# Patient Record
Sex: FEMALE | Race: ASIAN | NOT HISPANIC OR LATINO | URBAN - METROPOLITAN AREA
[De-identification: names, ages, dates, MRNs, and addresses within clinical notes are randomized per-mention and may not be internally consistent; named-entity substitution may affect disease eponyms.]

---

## 2021-03-01 ENCOUNTER — OUTPATIENT (OUTPATIENT)
Dept: OUTPATIENT SERVICES | Facility: HOSPITAL | Age: 76
LOS: 1 days | End: 2021-03-01
Payer: MEDICAID

## 2021-03-03 ENCOUNTER — TRANSCRIPTION ENCOUNTER (OUTPATIENT)
Age: 76
End: 2021-03-03

## 2021-03-04 ENCOUNTER — INPATIENT (INPATIENT)
Facility: HOSPITAL | Age: 76
LOS: 4 days | Discharge: HOME | DRG: 853 | End: 2021-03-09
Attending: INTERNAL MEDICINE | Admitting: INTERNAL MEDICINE
Payer: MEDICAID

## 2021-03-04 VITALS — WEIGHT: 141.98 LBS

## 2021-03-04 DIAGNOSIS — A41.9 SEPSIS, UNSPECIFIED ORGANISM: ICD-10-CM

## 2021-03-04 DIAGNOSIS — N17.9 ACUTE KIDNEY FAILURE, UNSPECIFIED: ICD-10-CM

## 2021-03-04 DIAGNOSIS — I10 ESSENTIAL (PRIMARY) HYPERTENSION: ICD-10-CM

## 2021-03-04 DIAGNOSIS — Z01.818 ENCOUNTER FOR OTHER PREPROCEDURAL EXAMINATION: ICD-10-CM

## 2021-03-04 DIAGNOSIS — Z29.9 ENCOUNTER FOR PROPHYLACTIC MEASURES, UNSPECIFIED: ICD-10-CM

## 2021-03-04 DIAGNOSIS — E03.9 HYPOTHYROIDISM, UNSPECIFIED: ICD-10-CM

## 2021-03-04 DIAGNOSIS — E11.9 TYPE 2 DIABETES MELLITUS WITHOUT COMPLICATIONS: ICD-10-CM

## 2021-03-04 DIAGNOSIS — Z90.49 ACQUIRED ABSENCE OF OTHER SPECIFIED PARTS OF DIGESTIVE TRACT: Chronic | ICD-10-CM

## 2021-03-04 DIAGNOSIS — N20.1 CALCULUS OF URETER: ICD-10-CM

## 2021-03-04 DIAGNOSIS — E78.5 HYPERLIPIDEMIA, UNSPECIFIED: ICD-10-CM

## 2021-03-04 DIAGNOSIS — E87.2 ACIDOSIS: ICD-10-CM

## 2021-03-04 LAB
ACETONE SERPL-MCNC: NEGATIVE — SIGNIFICANT CHANGE UP
ALBUMIN SERPL ELPH-MCNC: 3.2 G/DL — LOW (ref 3.5–5)
ALP SERPL-CCNC: 48 U/L — SIGNIFICANT CHANGE UP (ref 40–120)
ALT FLD-CCNC: 25 U/L DA — SIGNIFICANT CHANGE UP (ref 10–60)
ANION GAP SERPL CALC-SCNC: 10 MMOL/L — SIGNIFICANT CHANGE UP (ref 5–17)
APPEARANCE UR: ABNORMAL
APTT BLD: 35.3 SEC — SIGNIFICANT CHANGE UP (ref 27.5–35.5)
AST SERPL-CCNC: 24 U/L — SIGNIFICANT CHANGE UP (ref 10–40)
BACTERIA # UR AUTO: ABNORMAL /HPF
BASOPHILS # BLD AUTO: 0 K/UL — SIGNIFICANT CHANGE UP (ref 0–0.2)
BASOPHILS NFR BLD AUTO: 0 % — SIGNIFICANT CHANGE UP (ref 0–2)
BILIRUB SERPL-MCNC: 0.5 MG/DL — SIGNIFICANT CHANGE UP (ref 0.2–1.2)
BILIRUB UR-MCNC: NEGATIVE — SIGNIFICANT CHANGE UP
BUN SERPL-MCNC: 22 MG/DL — HIGH (ref 7–18)
CALCIUM SERPL-MCNC: 10 MG/DL — SIGNIFICANT CHANGE UP (ref 8.4–10.5)
CHLORIDE SERPL-SCNC: 101 MMOL/L — SIGNIFICANT CHANGE UP (ref 96–108)
CO2 SERPL-SCNC: 23 MMOL/L — SIGNIFICANT CHANGE UP (ref 22–31)
COLOR SPEC: YELLOW — SIGNIFICANT CHANGE UP
CREAT SERPL-MCNC: 1.39 MG/DL — HIGH (ref 0.5–1.3)
DIFF PNL FLD: ABNORMAL
EOSINOPHIL # BLD AUTO: 0 K/UL — SIGNIFICANT CHANGE UP (ref 0–0.5)
EOSINOPHIL NFR BLD AUTO: 0 % — SIGNIFICANT CHANGE UP (ref 0–6)
EPI CELLS # UR: SIGNIFICANT CHANGE UP /HPF
GLUCOSE SERPL-MCNC: 198 MG/DL — HIGH (ref 70–99)
GLUCOSE UR QL: NEGATIVE — SIGNIFICANT CHANGE UP
HCT VFR BLD CALC: 35.1 % — SIGNIFICANT CHANGE UP (ref 34.5–45)
HGB BLD-MCNC: 11.5 G/DL — SIGNIFICANT CHANGE UP (ref 11.5–15.5)
HYALINE CASTS # UR AUTO: ABNORMAL /LPF
INR BLD: 1.28 RATIO — HIGH (ref 0.88–1.16)
KETONES UR-MCNC: ABNORMAL
LACTATE SERPL-SCNC: 2.5 MMOL/L — HIGH (ref 0.7–2)
LACTATE SERPL-SCNC: 2.5 MMOL/L — HIGH (ref 0.7–2)
LEUKOCYTE ESTERASE UR-ACNC: ABNORMAL
LYMPHOCYTES # BLD AUTO: 1.44 K/UL — SIGNIFICANT CHANGE UP (ref 1–3.3)
LYMPHOCYTES # BLD AUTO: 8 % — LOW (ref 13–44)
MANUAL SMEAR VERIFICATION: SIGNIFICANT CHANGE UP
MCHC RBC-ENTMCNC: 27.6 PG — SIGNIFICANT CHANGE UP (ref 27–34)
MCHC RBC-ENTMCNC: 32.8 GM/DL — SIGNIFICANT CHANGE UP (ref 32–36)
MCV RBC AUTO: 84.4 FL — SIGNIFICANT CHANGE UP (ref 80–100)
MONOCYTES # BLD AUTO: 1.26 K/UL — HIGH (ref 0–0.9)
MONOCYTES NFR BLD AUTO: 7 % — SIGNIFICANT CHANGE UP (ref 2–14)
NEUTROPHILS # BLD AUTO: 15.29 K/UL — HIGH (ref 1.8–7.4)
NEUTROPHILS NFR BLD AUTO: 75 % — SIGNIFICANT CHANGE UP (ref 43–77)
NEUTS BAND # BLD: 10 % — HIGH (ref 0–8)
NITRITE UR-MCNC: POSITIVE
NRBC # BLD: 0 /100 — SIGNIFICANT CHANGE UP (ref 0–0)
PH UR: 5 — SIGNIFICANT CHANGE UP (ref 5–8)
PLAT MORPH BLD: NORMAL — SIGNIFICANT CHANGE UP
PLATELET # BLD AUTO: 159 K/UL — SIGNIFICANT CHANGE UP (ref 150–400)
POTASSIUM SERPL-MCNC: 3.6 MMOL/L — SIGNIFICANT CHANGE UP (ref 3.5–5.3)
POTASSIUM SERPL-SCNC: 3.6 MMOL/L — SIGNIFICANT CHANGE UP (ref 3.5–5.3)
PROT SERPL-MCNC: 7.5 G/DL — SIGNIFICANT CHANGE UP (ref 6–8.3)
PROT UR-MCNC: 30 MG/DL
PROTHROM AB SERPL-ACNC: 15 SEC — HIGH (ref 10.6–13.6)
RBC # BLD: 4.16 M/UL — SIGNIFICANT CHANGE UP (ref 3.8–5.2)
RBC # FLD: 14.7 % — HIGH (ref 10.3–14.5)
RBC BLD AUTO: NORMAL — SIGNIFICANT CHANGE UP
RBC CASTS # UR COMP ASSIST: ABNORMAL /HPF (ref 0–2)
SARS-COV-2 RNA SPEC QL NAA+PROBE: SIGNIFICANT CHANGE UP
SODIUM SERPL-SCNC: 134 MMOL/L — LOW (ref 135–145)
SP GR SPEC: 1.01 — SIGNIFICANT CHANGE UP (ref 1.01–1.02)
UROBILINOGEN FLD QL: NEGATIVE — SIGNIFICANT CHANGE UP
WBC # BLD: 17.99 K/UL — HIGH (ref 3.8–10.5)
WBC # FLD AUTO: 17.99 K/UL — HIGH (ref 3.8–10.5)
WBC UR QL: >50 /HPF (ref 0–5)

## 2021-03-04 PROCEDURE — 70450 CT HEAD/BRAIN W/O DYE: CPT | Mod: 26,MA

## 2021-03-04 PROCEDURE — 93010 ELECTROCARDIOGRAM REPORT: CPT

## 2021-03-04 PROCEDURE — 74177 CT ABD & PELVIS W/CONTRAST: CPT | Mod: 26,MA

## 2021-03-04 PROCEDURE — 99291 CRITICAL CARE FIRST HOUR: CPT

## 2021-03-04 PROCEDURE — 71045 X-RAY EXAM CHEST 1 VIEW: CPT | Mod: 26

## 2021-03-04 PROCEDURE — 52332 CYSTOSCOPY AND TREATMENT: CPT | Mod: LT

## 2021-03-04 PROCEDURE — 99223 1ST HOSP IP/OBS HIGH 75: CPT

## 2021-03-04 RX ORDER — HEPARIN SODIUM 5000 [USP'U]/ML
5000 INJECTION INTRAVENOUS; SUBCUTANEOUS EVERY 8 HOURS
Refills: 0 | Status: DISCONTINUED | OUTPATIENT
Start: 2021-03-04 | End: 2021-03-09

## 2021-03-04 RX ORDER — SODIUM CHLORIDE 9 MG/ML
2000 INJECTION INTRAMUSCULAR; INTRAVENOUS; SUBCUTANEOUS ONCE
Refills: 0 | Status: COMPLETED | OUTPATIENT
Start: 2021-03-04 | End: 2021-03-04

## 2021-03-04 RX ORDER — CIPROFLOXACIN LACTATE 400MG/40ML
400 VIAL (ML) INTRAVENOUS EVERY 12 HOURS
Refills: 0 | Status: DISCONTINUED | OUTPATIENT
Start: 2021-03-04 | End: 2021-03-05

## 2021-03-04 RX ORDER — INSULIN LISPRO 100/ML
VIAL (ML) SUBCUTANEOUS EVERY 6 HOURS
Refills: 0 | Status: DISCONTINUED | OUTPATIENT
Start: 2021-03-04 | End: 2021-03-07

## 2021-03-04 RX ORDER — SODIUM CHLORIDE 9 MG/ML
1000 INJECTION, SOLUTION INTRAVENOUS
Refills: 0 | Status: DISCONTINUED | OUTPATIENT
Start: 2021-03-04 | End: 2021-03-09

## 2021-03-04 RX ORDER — GLUCAGON INJECTION, SOLUTION 0.5 MG/.1ML
1 INJECTION, SOLUTION SUBCUTANEOUS ONCE
Refills: 0 | Status: DISCONTINUED | OUTPATIENT
Start: 2021-03-04 | End: 2021-03-09

## 2021-03-04 RX ORDER — DEXTROSE 50 % IN WATER 50 %
15 SYRINGE (ML) INTRAVENOUS ONCE
Refills: 0 | Status: DISCONTINUED | OUTPATIENT
Start: 2021-03-04 | End: 2021-03-09

## 2021-03-04 RX ORDER — PIPERACILLIN AND TAZOBACTAM 4; .5 G/20ML; G/20ML
3.38 INJECTION, POWDER, LYOPHILIZED, FOR SOLUTION INTRAVENOUS ONCE
Refills: 0 | Status: COMPLETED | OUTPATIENT
Start: 2021-03-04 | End: 2021-03-04

## 2021-03-04 RX ORDER — DEXTROSE MONOHYDRATE, SODIUM CHLORIDE, AND POTASSIUM CHLORIDE 50; .745; 4.5 G/1000ML; G/1000ML; G/1000ML
1000 INJECTION, SOLUTION INTRAVENOUS
Refills: 0 | Status: DISCONTINUED | OUTPATIENT
Start: 2021-03-04 | End: 2021-03-05

## 2021-03-04 RX ORDER — MORPHINE SULFATE 50 MG/1
2 CAPSULE, EXTENDED RELEASE ORAL EVERY 4 HOURS
Refills: 0 | Status: DISCONTINUED | OUTPATIENT
Start: 2021-03-04 | End: 2021-03-07

## 2021-03-04 RX ORDER — METRONIDAZOLE 500 MG
500 TABLET ORAL EVERY 8 HOURS
Refills: 0 | Status: DISCONTINUED | OUTPATIENT
Start: 2021-03-04 | End: 2021-03-05

## 2021-03-04 RX ORDER — ACETAMINOPHEN 500 MG
650 TABLET ORAL EVERY 6 HOURS
Refills: 0 | Status: DISCONTINUED | OUTPATIENT
Start: 2021-03-04 | End: 2021-03-09

## 2021-03-04 RX ORDER — ONDANSETRON 8 MG/1
4 TABLET, FILM COATED ORAL EVERY 6 HOURS
Refills: 0 | Status: DISCONTINUED | OUTPATIENT
Start: 2021-03-04 | End: 2021-03-09

## 2021-03-04 RX ORDER — SODIUM CHLORIDE 9 MG/ML
1000 INJECTION INTRAMUSCULAR; INTRAVENOUS; SUBCUTANEOUS ONCE
Refills: 0 | Status: COMPLETED | OUTPATIENT
Start: 2021-03-04 | End: 2021-03-04

## 2021-03-04 RX ORDER — DEXTROSE 50 % IN WATER 50 %
25 SYRINGE (ML) INTRAVENOUS ONCE
Refills: 0 | Status: DISCONTINUED | OUTPATIENT
Start: 2021-03-04 | End: 2021-03-09

## 2021-03-04 RX ORDER — ACETAMINOPHEN 500 MG
650 TABLET ORAL ONCE
Refills: 0 | Status: COMPLETED | OUTPATIENT
Start: 2021-03-04 | End: 2021-03-04

## 2021-03-04 RX ORDER — DEXTROSE 50 % IN WATER 50 %
12.5 SYRINGE (ML) INTRAVENOUS ONCE
Refills: 0 | Status: DISCONTINUED | OUTPATIENT
Start: 2021-03-04 | End: 2021-03-09

## 2021-03-04 RX ADMIN — Medication 200 MILLIGRAM(S): at 22:15

## 2021-03-04 RX ADMIN — SODIUM CHLORIDE 2000 MILLILITER(S): 9 INJECTION INTRAMUSCULAR; INTRAVENOUS; SUBCUTANEOUS at 17:18

## 2021-03-04 RX ADMIN — Medication 650 MILLIGRAM(S): at 16:27

## 2021-03-04 RX ADMIN — Medication 100 MILLIGRAM(S): at 21:13

## 2021-03-04 RX ADMIN — Medication 650 MILLIGRAM(S): at 16:59

## 2021-03-04 RX ADMIN — PIPERACILLIN AND TAZOBACTAM 200 GRAM(S): 4; .5 INJECTION, POWDER, LYOPHILIZED, FOR SOLUTION INTRAVENOUS at 16:27

## 2021-03-04 RX ADMIN — HEPARIN SODIUM 5000 UNIT(S): 5000 INJECTION INTRAVENOUS; SUBCUTANEOUS at 21:12

## 2021-03-04 RX ADMIN — SODIUM CHLORIDE 1000 MILLILITER(S): 9 INJECTION INTRAMUSCULAR; INTRAVENOUS; SUBCUTANEOUS at 20:49

## 2021-03-04 RX ADMIN — SODIUM CHLORIDE 2000 MILLILITER(S): 9 INJECTION INTRAMUSCULAR; INTRAVENOUS; SUBCUTANEOUS at 16:15

## 2021-03-04 RX ADMIN — PIPERACILLIN AND TAZOBACTAM 3.38 GRAM(S): 4; .5 INJECTION, POWDER, LYOPHILIZED, FOR SOLUTION INTRAVENOUS at 15:58

## 2021-03-04 RX ADMIN — DEXTROSE MONOHYDRATE, SODIUM CHLORIDE, AND POTASSIUM CHLORIDE 125 MILLILITER(S): 50; .745; 4.5 INJECTION, SOLUTION INTRAVENOUS at 22:53

## 2021-03-04 NOTE — H&P ADULT - NSHPPHYSICALEXAM_GEN_ALL_CORE
NAD  awake, poor historian, non verbal, lethargic  S1S2  b/l air entry  abd soft, no rebound or guarding, appears NT  ext no c/c

## 2021-03-04 NOTE — ED ADULT NURSE NOTE - OBJECTIVE STATEMENT
pt is here for altered mental status.  BIBA, altered mental status for 2 hours, denied chest pain or sob, no distress noted at this time.

## 2021-03-04 NOTE — CONSULT NOTE ADULT - PROBLEM SELECTOR RECOMMENDATION 8
Pt on lipitor   medication on hold while pt is npo pt is on Levothyroxine 100mcg   will start 75mg IV push as pt is npo   f/u TSH

## 2021-03-04 NOTE — CONSULT NOTE ADULT - PROBLEM SELECTOR RECOMMENDATION 4
Likely From sepsis, unlikely NGOZI   C/W IVF    trend lactate -BUN/CR 22/1.39   - Unknown baseline   - Prerenal in the setting of sepsis and or post renal in the setting of left mild hydroureteronephrosis   - s/p IVF   - Will recommend to recheck BMP   - Avoid nephrotoxins   - If remains high would recommend to send urine lytes

## 2021-03-04 NOTE — CONSULT NOTE ADULT - ASSESSMENT
75 year old female with past medical hx of HTN, DM, Hypothyroidism, Dementia, GERD being admitted to under surgical service for sepsis secondary to urolithiasis and obstruction with left sided mild hydroureteronephrosis, pt had fever of 102.7 in the ED, has wbc count of 17.99. U/A is positive for infection, elevated lactate of 2.5, s/p 3L IVF and zosyn in the ED currently on IVF and Ciprofloxacin and flagyl, blood cultures and urine cultures testing. Medical consult was requested given multiple comorbidities and for pre op optimization.

## 2021-03-04 NOTE — CONSULT NOTE ADULT - PROBLEM SELECTOR RECOMMENDATION 5
Pt is on Metformin 500mg bid, Glimepiride 2mg OD, Alogliptin 25mg for DM   Hold po meds for now   will start pt on lantus 5 and HSS   monitor blood glucose level >140 <180  f/u hba1c Likely From sepsis, unlikely NGOZI   C/W IVF    trend lactate

## 2021-03-04 NOTE — H&P ADULT - HISTORY OF PRESENT ILLNESS
74 y/o female with h/o HTN, DM, HLD, hypothyroid  PSH-cholecystectomy, hip sx    c/o several days abd pain brought by family for lethargy today, difficulty ambulating, fever    < from: CT Head No Cont (03.04.21 @ 18:54) >  IMPRESSION: No acute intracranial hemorrhage, mass effect, or shift of the midline structures.    Mild chronic white matter microvascular type changes.      < end of copied text >  < from: CT Abdomen and Pelvis w/ IV Cont (03.04.21 @ 18:53) >  IMPRESSION:    Mild left hydroureteronephrosis secondary to a 1.5 cm calculus proximal left ureter. Correlate clinicallyfor concomitant infection.  No evidence of appendicitis or colitis      < end of copied text >  < from: CT Abdomen and Pelvis w/ IV Cont (03.04.21 @ 18:53) >  KIDNEYS/URETERS: Mild left hydroureteronephrosis secondary to a 1.5 cmcalculus in the proximal third of the left ureter. Augmented urothelial enhancement left renal pelvis and proximal ureter. Correlate clinically for infection. Obstructive left nephrogram  left perirenal stranding, small left perirenal fluid. Left renal cyst    < end of copied text >  Febrile in ED to 103  wbc 18, renal function^, lactate 2.5, UA+

## 2021-03-04 NOTE — H&P ADULT - NSICDXPASTMEDICALHX_GEN_ALL_CORE_FT
PAST MEDICAL HISTORY:  DM (diabetes mellitus)     HLD (hyperlipidemia)     HTN (hypertension)     Hypothyroidism

## 2021-03-04 NOTE — ED PROVIDER NOTE - PROGRESS NOTE DETAILS
CT w L sided hydronephrosis and UTI, infected stone and sepsis. Feels mildly better, lactate not cleared after 2 L NS. Will give another liter NS. Paged Dr Benton, no answer. Spoke w Dr Velasco, aware of patient and likely need for immediate intervention/stent placement. Will see pt and speak w Dr Betnon (currently in the OR). Dr Quintanilla in the ED, accepted pt under Dr Benton. Will get Medicine consult for clearance

## 2021-03-04 NOTE — ED PROVIDER NOTE - CLINICAL SUMMARY MEDICAL DECISION MAKING FREE TEXT BOX
Sepsis from viral source vs bacterial source. Possible abdominal pathology vs COVID-19. Will get septic workup, antibiotics, CT head and likely admit.

## 2021-03-04 NOTE — ED PROVIDER NOTE - CARE PLAN
Principal Discharge DX:	Sepsis, due to unspecified organism, unspecified whether acute organ dysfunction present  Secondary Diagnosis:	Urinary tract infection with hematuria, site unspecified  Secondary Diagnosis:	Nephrolithiasis   Principal Discharge DX:	Sepsis, due to unspecified organism, unspecified whether acute organ dysfunction present  Secondary Diagnosis:	Urinary tract infection with hematuria, site unspecified  Secondary Diagnosis:	Ureterolithiasis

## 2021-03-04 NOTE — ED PROVIDER NOTE - MUSCULOSKELETAL, MLM
Spine appears normal, range of motion is not limited, no muscle or joint tenderness, moving all extremities.

## 2021-03-04 NOTE — CONSULT NOTE ADULT - PROBLEM SELECTOR RECOMMENDATION 3
Pt has ECG showing sinus tachycardia likely in the setting of sepsis   Has METS score of >4 given pt is able to walk outside around 45 min and 20 min of indoor cycling without any limitation.   Has RCRI score of 0, class 1 risk, 3.9% 30-day risk of death, MI, or cardiac arrest   Given her age pt is intermediate risk for low risk procedure

## 2021-03-04 NOTE — CONSULT NOTE ADULT - PROBLEM SELECTOR RECOMMENDATION 9
-Pt has sepsis likely from obstructed urolithiasis.   -fever + positive U/A + 1.5 cm stone on left ureter with mild hydroureteronephrosis on left side + elevated wbc count+ lactate elevated to 2.5   -S/p 3L ivf and zosyn in the ED   - Currently pt is on Ciprofloxacin and flagyl --> would recommend to change abx to Zosyn.   - Agree with IVF   - Pt might need cystoscopy with stent   - Would recommend to recheck lactate as lactate is steady at 2.5  - F/u Blood and urine cultures Pt is on heparin sq for dvt ppx Pt on lipitor   medication on hold while pt is npo

## 2021-03-04 NOTE — ED PROVIDER NOTE - OBJECTIVE STATEMENT
76 y/o F with a significant PMHx of DM, HTN, HLD, hypothyroidism, cholecystectomy and hip surgery presents to the ED for AMS. As per EMS, patient is unable to provide history. Daughter contacted over the phone states that since yesterday, patient has been complaining of abdominal pain, constipation, fever of 102F and generalized weakness today. Patient also unable to ambulate. Denies vomiting, diarrhea, cough, or other respiratory symptoms. Patient's overall status deteriorated today, causing concern and ambulance was called. Patient received first dose of COVID-19 vaccine last month. No sick contacts or recent travel.

## 2021-03-04 NOTE — CONSULT NOTE ADULT - PROBLEM SELECTOR RECOMMENDATION 7
pt is on Levothyroxine 100mcg   will start 75mg IV push as pt is npo   f/u TSH Pt is on losartan  will hold meds for now given ascencion and normal blood pressure in a pt with sepsis

## 2021-03-04 NOTE — CONSULT NOTE ADULT - PROBLEM SELECTOR RECOMMENDATION 6
Pt is on losartan  will hold meds for now given ascencion and normal blood pressure in a pt with sepsis Pt is on Metformin 500mg bid, Glimepiride 2mg OD, Alogliptin 25mg for DM   Hold po meds for now   will start pt on lantus 5 and HSS   monitor blood glucose level >140 <180  f/u hba1c

## 2021-03-04 NOTE — H&P ADULT - PROBLEM SELECTOR PLAN 1
keep npo, admit, needs iv hydration, serial lactate, cbc, iv abx, med consult, pre-op mode for poss cysto/stent

## 2021-03-04 NOTE — CONSULT NOTE ADULT - ATTENDING COMMENTS
75 year old with PMH of HTN, HLD, hypothyroidism admitted with clinical features keeping with acute septic encephalopathy from a UTI, left pyelonephritis, a large left obstructive ureteral calculus with concern for ascending infection 75 year old with PMH of HTN, HLD, hypothyroidism admitted with clinical features keeping with acute septic encephalopathy from a UTI, left pyelonephritis, a large left obstructive ureteral calculus with left hydroureteronephrosis with concern for ascending infection.  Pt admitted to the Surgery-Urology service and for urgent urologic intervention. Hospital Medicine consultation requested for darren-op and post op care /management.    Vital Signs Last 24 Hrs  T(C): 37.6 (05 Mar 2021 02:30), Max: 39.3 (04 Mar 2021 16:02)  T(F): 99.6 (05 Mar 2021 02:30), Max: 102.7 (04 Mar 2021 16:02)  HR: 115 (05 Mar 2021 02:30) (97 - 122)  BP: 138/67 (05 Mar 2021 02:30) (114/65 - 148/69)  BP(mean): 83 (05 Mar 2021 02:30) (76 - 126)  RR: 22 (05 Mar 2021 02:30) (18 - 31)  SpO2: 100% (05 Mar 2021 02:30) (93% - 100%)    Exams as above    Labs reviewed                        11.5   17.99 )-----------( 159      ( 04 Mar 2021 16:26 )             35.1   leucocytosis with left shift      03-04    134<L>  |  101  |  22<H>  ----------------------------<  198<H>  3.6   |  23  |  1.39<H>    Ca    10.0      04 Mar 2021 16:26  TPro  7.5  /  Alb  3.2<L>  /  TBili  0.5  /  DBili  x   /  AST  24  /  ALT  25  /  AlkPhos  48  03-04    Urinalysis Basic - ( 04 Mar 2021 16:41 )  Color: Yellow / Appearance: Slightly Turbid / S.015 / pH: x  Gluc: x / Ketone: Trace  / Bili: Negative / Urobili: Negative   Blood: x / Protein: 30 mg/dL / Nitrite: Positive   Leuk Esterase: Moderate / RBC: 2-5 /HPF / WBC >50 /HPF   Sq Epi: x / Non Sq Epi: Few /HPF / Bacteria: Moderate /HPF     CT abd/pelvis  Mild left hydroureteronephrosis secondary to a 1.5 cm calculus in the proximal third of the left ureter. Augmented urothelial enhancement left renal pelvis and proximal ureter. Correlate clinically for infection. Obstructive left nephrogram  left perirenal stranding, small left perirenal fluid. Left renal cyst    CT head   unremarkable acutely    Impression  75 year old woman with PMH as detailed above admitted acutely with fevers and changes in mental status all c/w acute septic encephalopathy  from UTI/ left pyelonephritis, a large left obstructive ureteral calculus with left hydroureteronephrosis with concern for ascending infection.  Pt covered with broad spectrum IV antibiotics but with urgent need for infection source control would be taken to the OR for a cystoscopy and relief of obstruction.    Assessment  1) Acute septic encephalopathy  2) Left pyelonephritis with sepsis  3) Left obstructive ureteral calculus  4) Left hydroureteronephrosis  5) RICKEY    PRE-OP consideration  While this urgent and potentially life-saving procedure supersedes any contraindications to surgery, it is worthwhile to note that the patient has a low risk for a cardiac event or mortality in the darren-op and 30-day post op period based on the RCRI and Beltran scores.    Recommendations  1) Continue broad spectrum antibiotics with IV zosyn post -procedure/op  2) Follow sepsis work up ; repeat lactate and chemistry  3) Continue judicious IVF   4) Monitor renal indices for improvement  5) Short acting insulin regimen - RISS with POC glucose q 4 -6 hourly till able to commence oral intake  6) Other plans as above

## 2021-03-05 LAB
ALBUMIN SERPL ELPH-MCNC: 2.3 G/DL — LOW (ref 3.5–5)
ALP SERPL-CCNC: 47 U/L — SIGNIFICANT CHANGE UP (ref 40–120)
ALT FLD-CCNC: 18 U/L DA — SIGNIFICANT CHANGE UP (ref 10–60)
ANION GAP SERPL CALC-SCNC: 10 MMOL/L — SIGNIFICANT CHANGE UP (ref 5–17)
AST SERPL-CCNC: 23 U/L — SIGNIFICANT CHANGE UP (ref 10–40)
BASOPHILS # BLD AUTO: 0 K/UL — SIGNIFICANT CHANGE UP (ref 0–0.2)
BASOPHILS NFR BLD AUTO: 0 % — SIGNIFICANT CHANGE UP (ref 0–2)
BILIRUB SERPL-MCNC: 0.5 MG/DL — SIGNIFICANT CHANGE UP (ref 0.2–1.2)
BUN SERPL-MCNC: 15 MG/DL — SIGNIFICANT CHANGE UP (ref 7–18)
CALCIUM SERPL-MCNC: 8.7 MG/DL — SIGNIFICANT CHANGE UP (ref 8.4–10.5)
CHLORIDE SERPL-SCNC: 108 MMOL/L — SIGNIFICANT CHANGE UP (ref 96–108)
CO2 SERPL-SCNC: 23 MMOL/L — SIGNIFICANT CHANGE UP (ref 22–31)
CREAT SERPL-MCNC: 1.05 MG/DL — SIGNIFICANT CHANGE UP (ref 0.5–1.3)
E COLI DNA BLD POS QL NAA+NON-PROBE: SIGNIFICANT CHANGE UP
EOSINOPHIL # BLD AUTO: 0 K/UL — SIGNIFICANT CHANGE UP (ref 0–0.5)
EOSINOPHIL NFR BLD AUTO: 0 % — SIGNIFICANT CHANGE UP (ref 0–6)
GLUCOSE SERPL-MCNC: 181 MG/DL — HIGH (ref 70–99)
GRAM STN FLD: SIGNIFICANT CHANGE UP
HCT VFR BLD CALC: 30.2 % — LOW (ref 34.5–45)
HGB BLD-MCNC: 9.5 G/DL — LOW (ref 11.5–15.5)
LACTATE SERPL-SCNC: 2.5 MMOL/L — HIGH (ref 0.7–2)
LYMPHOCYTES # BLD AUTO: 0.71 K/UL — LOW (ref 1–3.3)
LYMPHOCYTES # BLD AUTO: 4 % — LOW (ref 13–44)
MAGNESIUM SERPL-MCNC: 1.2 MG/DL — LOW (ref 1.6–2.6)
MCHC RBC-ENTMCNC: 27.1 PG — SIGNIFICANT CHANGE UP (ref 27–34)
MCHC RBC-ENTMCNC: 31.5 GM/DL — LOW (ref 32–36)
MCV RBC AUTO: 86 FL — SIGNIFICANT CHANGE UP (ref 80–100)
METHOD TYPE: SIGNIFICANT CHANGE UP
MONOCYTES # BLD AUTO: 1.42 K/UL — HIGH (ref 0–0.9)
MONOCYTES NFR BLD AUTO: 8 % — SIGNIFICANT CHANGE UP (ref 2–14)
NEUTROPHILS # BLD AUTO: 15.28 K/UL — HIGH (ref 1.8–7.4)
NEUTROPHILS NFR BLD AUTO: 78 % — HIGH (ref 43–77)
PHOSPHATE SERPL-MCNC: 2.9 MG/DL — SIGNIFICANT CHANGE UP (ref 2.5–4.5)
PLATELET # BLD AUTO: 118 K/UL — LOW (ref 150–400)
POTASSIUM SERPL-MCNC: 3.3 MMOL/L — LOW (ref 3.5–5.3)
POTASSIUM SERPL-SCNC: 3.3 MMOL/L — LOW (ref 3.5–5.3)
PROT SERPL-MCNC: 5.8 G/DL — LOW (ref 6–8.3)
RBC # BLD: 3.51 M/UL — LOW (ref 3.8–5.2)
RBC # FLD: 14.6 % — HIGH (ref 10.3–14.5)
SODIUM SERPL-SCNC: 141 MMOL/L — SIGNIFICANT CHANGE UP (ref 135–145)
SPECIMEN SOURCE: SIGNIFICANT CHANGE UP
SPECIMEN SOURCE: SIGNIFICANT CHANGE UP
TROPONIN I SERPL-MCNC: <0.015 NG/ML — SIGNIFICANT CHANGE UP (ref 0–0.04)
WBC # BLD: 17.77 K/UL — HIGH (ref 3.8–10.5)
WBC # FLD AUTO: 17.77 K/UL — HIGH (ref 3.8–10.5)

## 2021-03-05 PROCEDURE — 99233 SBSQ HOSP IP/OBS HIGH 50: CPT | Mod: AI

## 2021-03-05 PROCEDURE — 99232 SBSQ HOSP IP/OBS MODERATE 35: CPT

## 2021-03-05 RX ORDER — ONDANSETRON 8 MG/1
4 TABLET, FILM COATED ORAL ONCE
Refills: 0 | Status: DISCONTINUED | OUTPATIENT
Start: 2021-03-05 | End: 2021-03-05

## 2021-03-05 RX ORDER — SODIUM CHLORIDE 9 MG/ML
1000 INJECTION, SOLUTION INTRAVENOUS
Refills: 0 | Status: DISCONTINUED | OUTPATIENT
Start: 2021-03-05 | End: 2021-03-05

## 2021-03-05 RX ORDER — SODIUM CHLORIDE 9 MG/ML
1000 INJECTION, SOLUTION INTRAVENOUS
Refills: 0 | Status: DISCONTINUED | OUTPATIENT
Start: 2021-03-05 | End: 2021-03-09

## 2021-03-05 RX ORDER — INSULIN GLARGINE 100 [IU]/ML
5 INJECTION, SOLUTION SUBCUTANEOUS AT BEDTIME
Refills: 0 | Status: DISCONTINUED | OUTPATIENT
Start: 2021-03-05 | End: 2021-03-09

## 2021-03-05 RX ORDER — ACETAMINOPHEN 500 MG
1000 TABLET ORAL ONCE
Refills: 0 | Status: COMPLETED | OUTPATIENT
Start: 2021-03-05 | End: 2021-03-05

## 2021-03-05 RX ORDER — PIPERACILLIN AND TAZOBACTAM 4; .5 G/20ML; G/20ML
3.38 INJECTION, POWDER, LYOPHILIZED, FOR SOLUTION INTRAVENOUS ONCE
Refills: 0 | Status: COMPLETED | OUTPATIENT
Start: 2021-03-05 | End: 2021-03-05

## 2021-03-05 RX ORDER — POTASSIUM CHLORIDE 20 MEQ
10 PACKET (EA) ORAL
Refills: 0 | Status: COMPLETED | OUTPATIENT
Start: 2021-03-05 | End: 2021-03-05

## 2021-03-05 RX ORDER — MAGNESIUM SULFATE 500 MG/ML
2 VIAL (ML) INJECTION ONCE
Refills: 0 | Status: COMPLETED | OUTPATIENT
Start: 2021-03-05 | End: 2021-03-05

## 2021-03-05 RX ORDER — FENTANYL CITRATE 50 UG/ML
50 INJECTION INTRAVENOUS
Refills: 0 | Status: DISCONTINUED | OUTPATIENT
Start: 2021-03-05 | End: 2021-03-05

## 2021-03-05 RX ORDER — FENTANYL CITRATE 50 UG/ML
25 INJECTION INTRAVENOUS
Refills: 0 | Status: DISCONTINUED | OUTPATIENT
Start: 2021-03-05 | End: 2021-03-05

## 2021-03-05 RX ORDER — LEVOTHYROXINE SODIUM 125 MCG
75 TABLET ORAL
Refills: 0 | Status: DISCONTINUED | OUTPATIENT
Start: 2021-03-05 | End: 2021-03-08

## 2021-03-05 RX ORDER — PIPERACILLIN AND TAZOBACTAM 4; .5 G/20ML; G/20ML
3.38 INJECTION, POWDER, LYOPHILIZED, FOR SOLUTION INTRAVENOUS EVERY 8 HOURS
Refills: 0 | Status: DISCONTINUED | OUTPATIENT
Start: 2021-03-05 | End: 2021-03-07

## 2021-03-05 RX ORDER — PANTOPRAZOLE SODIUM 20 MG/1
40 TABLET, DELAYED RELEASE ORAL ONCE
Refills: 0 | Status: COMPLETED | OUTPATIENT
Start: 2021-03-05 | End: 2021-03-05

## 2021-03-05 RX ADMIN — PIPERACILLIN AND TAZOBACTAM 200 GRAM(S): 4; .5 INJECTION, POWDER, LYOPHILIZED, FOR SOLUTION INTRAVENOUS at 01:38

## 2021-03-05 RX ADMIN — Medication 100 MILLIEQUIVALENT(S): at 09:16

## 2021-03-05 RX ADMIN — PIPERACILLIN AND TAZOBACTAM 25 GRAM(S): 4; .5 INJECTION, POWDER, LYOPHILIZED, FOR SOLUTION INTRAVENOUS at 09:50

## 2021-03-05 RX ADMIN — HEPARIN SODIUM 5000 UNIT(S): 5000 INJECTION INTRAVENOUS; SUBCUTANEOUS at 22:16

## 2021-03-05 RX ADMIN — HEPARIN SODIUM 5000 UNIT(S): 5000 INJECTION INTRAVENOUS; SUBCUTANEOUS at 13:22

## 2021-03-05 RX ADMIN — INSULIN GLARGINE 5 UNIT(S): 100 INJECTION, SOLUTION SUBCUTANEOUS at 22:16

## 2021-03-05 RX ADMIN — Medication 1000 MILLIGRAM(S): at 01:38

## 2021-03-05 RX ADMIN — Medication 400 MILLIGRAM(S): at 01:24

## 2021-03-05 RX ADMIN — Medication 100 MILLIEQUIVALENT(S): at 06:42

## 2021-03-05 RX ADMIN — Medication 100 MILLIEQUIVALENT(S): at 04:33

## 2021-03-05 RX ADMIN — HEPARIN SODIUM 5000 UNIT(S): 5000 INJECTION INTRAVENOUS; SUBCUTANEOUS at 06:41

## 2021-03-05 RX ADMIN — Medication 650 MILLIGRAM(S): at 17:57

## 2021-03-05 RX ADMIN — Medication 2: at 17:12

## 2021-03-05 RX ADMIN — SODIUM CHLORIDE 75 MILLILITER(S): 9 INJECTION, SOLUTION INTRAVENOUS at 02:47

## 2021-03-05 RX ADMIN — PIPERACILLIN AND TAZOBACTAM 25 GRAM(S): 4; .5 INJECTION, POWDER, LYOPHILIZED, FOR SOLUTION INTRAVENOUS at 21:48

## 2021-03-05 RX ADMIN — Medication 650 MILLIGRAM(S): at 19:29

## 2021-03-05 RX ADMIN — Medication 75 MICROGRAM(S): at 08:10

## 2021-03-05 RX ADMIN — PANTOPRAZOLE SODIUM 40 MILLIGRAM(S): 20 TABLET, DELAYED RELEASE ORAL at 04:32

## 2021-03-05 RX ADMIN — Medication 50 GRAM(S): at 04:32

## 2021-03-05 RX ADMIN — PIPERACILLIN AND TAZOBACTAM 25 GRAM(S): 4; .5 INJECTION, POWDER, LYOPHILIZED, FOR SOLUTION INTRAVENOUS at 17:12

## 2021-03-05 NOTE — BRIEF OPERATIVE NOTE - NSICDXBRIEFPOSTOP_GEN_ALL_CORE_FT
POST-OP DIAGNOSIS:  Fever due to infection 05-Mar-2021 19:16:08  Sirena Benton  Left nephrolithiasis 05-Mar-2021 19:15:56  Sirena Benton

## 2021-03-05 NOTE — SEPSIS NOTE - NSSUBJFT_GEN_A_CORE
75 year old female with past medical hx of HTN, DM, Hypothyroidism, Dementia, GERD being admitted to under surgical service for sepsis secondary to urolithiasis and obstruction with left sided mild hydroureteronephrosis, pt had fever of 102.7 in the ED, has wbc count of 17.99. U/A is positive for infection, elevated lactate of 2.5, s/p 3L IVF and zosyn in the ED currently on IVF and Ciprofloxacin and flagyl, blood cultures and urine cultures testing. ECG was sinus tachycardia on admission at 106 bpm. Pt was taken to OR for Cystoscope and Stent placement. Post OR code sepsis was called for fever 101 F.

## 2021-03-05 NOTE — BRIEF OPERATIVE NOTE - NSICDXBRIEFPROCEDURE_GEN_ALL_CORE_FT
PROCEDURES:  Cystoureteroscopy, with retrograde pyelogram and ureteral stent insertion 05-Mar-2021 19:15:06  Sirena Benton

## 2021-03-05 NOTE — PROGRESS NOTE ADULT - SUBJECTIVE AND OBJECTIVE BOX
Interval Events:    Code sepsis overnight, febrile and tachycardic in PACU s/p 1L bolus  Feeling well this morning, no discomfort with stent    O: Vital Signs Last 24 Hrs  T(C): 37 (05 Mar 2021 06:22), Max: 39.3 (04 Mar 2021 16:02)  T(F): 98.6 (05 Mar 2021 06:22), Max: 102.7 (04 Mar 2021 16:02)  HR: 94 (05 Mar 2021 06:22) (94 - 122)  BP: 117/61 (05 Mar 2021 06:22) (100/79 - 148/69)  BP(mean): 83 (05 Mar 2021 02:55) (76 - 126)  RR: 18 (05 Mar 2021 06:22) (18 - 31)  SpO2: 100% (05 Mar 2021 06:22) (93% - 100%)      04 Mar 2021 07:01  -  05 Mar 2021 07:00  --------------------------------------------------------  IN:    IV PiggyBack: 200 mL    Lactated Ringers: 150 mL    Lactated Ringers Bolus: 1000 mL  Total IN: 1350 mL    OUT:    Indwelling Catheter - Urethral (mL): 2000 mL  Total OUT: 2000 mL    Total NET: -650 mL          Physical Exam:    Gen: Well-developed, well-nourished in no acute distress  Resp: No additional work of breathing   GI: Soft, non-tender, non-distended, with normoactive bowel sounds.  No masses.  : No CVA tenderness  MSK: Moves all extremities equally  Skin: No rashes    Labs:                        9.5    17.77 )-----------( 118      ( 05 Mar 2021 02:43 )             30.2     05 Mar 2021 02:43    141    |  108    |  15     ----------------------------<  181    3.3     |  23     |  1.05     Ca    8.7        05 Mar 2021 02:43  Phos  2.9       05 Mar 2021 02:43  Mg     1.2       05 Mar 2021 02:43    TPro  5.8    /  Alb  2.3    /  TBili  0.5    /  DBili  x      /  AST  23     /  ALT  18     /  AlkPhos  47     05 Mar 2021 02:43    PT/INR - ( 04 Mar 2021 16:26 )   PT: 15.0 sec;   INR: 1.28 ratio         PTT - ( 04 Mar 2021 16:26 )  PTT:35.3 sec  CAPILLARY BLOOD GLUCOSE      POCT Blood Glucose.: 132 mg/dL (05 Mar 2021 06:45)  POCT Blood Glucose.: 185 mg/dL (04 Mar 2021 23:27)  POCT Blood Glucose.: 216 mg/dL (04 Mar 2021 15:51)    CARDIAC MARKERS ( 05 Mar 2021 04:08 )  <0.015 ng/mL / x     / x     / x     / x          LIVER FUNCTIONS - ( 05 Mar 2021 02:43 )  Alb: 2.3 g/dL / Pro: 5.8 g/dL / ALK PHOS: 47 U/L / ALT: 18 U/L DA / AST: 23 U/L / GGT: x             Culture - Blood (collected 04 Mar 2021 21:54)  Source: .Blood Blood-Peripheral  Gram Stain (05 Mar 2021 08:57):    Growth in aerobic bottle: Gram Negative Rods    Growth in anaerobic bottle: Gram Negative Rods  Preliminary Report (05 Mar 2021 08:58):    Growth in aerobic bottle: Gram Negative Rods    Growth in anaerobic bottle: Gram Negative Rods    ***Blood Panel PCR results on this specimen are available    approximately 3 hours after the Gram stain result.***    Gram stain, PCR, and/or culture results may not always    correspond due to difference in methodologies.    ************************************************************    This PCR assay was performed by multiplex PCR. This    Assay tests for 66 bacterial and resistance gene targets.    Please refer to the [facility] test directory    at [email or web address] for details.      Urinalysis Basic - ( 04 Mar 2021 16:41 )    Color: Yellow / Appearance: Slightly Turbid / S.015 / pH: x  Gluc: x / Ketone: Trace  / Bili: Negative / Urobili: Negative   Blood: x / Protein: 30 mg/dL / Nitrite: Positive   Leuk Esterase: Moderate / RBC: 2-5 /HPF / WBC >50 /HPF   Sq Epi: x / Non Sq Epi: Few /HPF / Bacteria: Moderate /HPF        MEDICATIONS  (STANDING):  dextrose 40% Gel 15 Gram(s) Oral once  dextrose 5%. 1000 milliLiter(s) (100 mL/Hr) IV Continuous <Continuous>  dextrose 5%. 1000 milliLiter(s) (50 mL/Hr) IV Continuous <Continuous>  dextrose 50% Injectable 25 Gram(s) IV Push once  dextrose 50% Injectable 12.5 Gram(s) IV Push once  dextrose 50% Injectable 25 Gram(s) IV Push once  glucagon  Injectable 1 milliGRAM(s) IntraMuscular once  heparin   Injectable 5000 Unit(s) SubCutaneous every 8 hours  insulin glargine Injectable (LANTUS) 5 Unit(s) SubCutaneous at bedtime  insulin lispro (ADMELOG) corrective regimen sliding scale   SubCutaneous every 6 hours  lactated ringers. 1000 milliLiter(s) (75 mL/Hr) IV Continuous <Continuous>  levothyroxine Injectable 75 MICROGram(s) IV Push <User Schedule>  piperacillin/tazobactam IVPB.. 3.375 Gram(s) IV Intermittent every 8 hours    MEDICATIONS  (PRN):  acetaminophen   Tablet .. 650 milliGRAM(s) Oral every 6 hours PRN Temp greater or equal to 38C (100.4F), Mild Pain (1 - 3)  morphine  - Injectable 2 milliGRAM(s) IV Push every 4 hours PRN Moderate Pain (4 - 6)  ondansetron Injectable 4 milliGRAM(s) IV Push every 6 hours PRN Nausea and/or Vomiting

## 2021-03-05 NOTE — PROGRESS NOTE ADULT - ASSESSMENT
75 year old with PMH of HTN, HLD, hypothyroidism admitted with clinical features keeping with acute septic encephalopathy from a UTI, left pyelonephritis, a large left obstructive ureteral calculus with left hydroureteronephrosis with concern for ascending infection.  Pt admitted to the Surgery-Urology service and for urgent urologic intervention. Hospital Medicine consultation requested for darren-op and post op care /management    Impression  75 year old woman with PMH as detailed above admitted acutely with fevers and changes in mental status all c/w acute septic encephalopathy  from UTI/ left pyelonephritis, a large left obstructive ureteral calculus with left hydroureteronephrosis with concern for ascending infection.    Assessment  Gram negative Bacteremia  Acute septic encephalopathy  Left pyelonephritis   Left obstructive ureteral calculus  Left hydroureteronephrosis  RICKEY    Recommendations  Continue with IV zosyn  repeat lactic acid, continue IV fluids  Repeat blood culture 3/7  monitor cr       75 year old with PMH of HTN, HLD, hypothyroidism admitted with clinical features keeping with acute septic encephalopathy from a UTI, left pyelonephritis, a large left obstructive ureteral calculus with left hydroureteronephrosis with concern for ascending infection.  Pt admitted to the Surgery-Urology service and for urgent urologic intervention. Hospital Medicine consultation requested for darren-op and post op care /management    Impression  75 year old woman with PMH as detailed above admitted acutely with fevers and changes in mental status all c/w acute septic encephalopathy  from UTI/ left pyelonephritis, a large left obstructive ureteral calculus with left hydroureteronephrosis with concern for ascending infection s/p urological procedure     Assessment  Gram negative Bacteremia  Acute septic encephalopathy  Left pyelonephritis   Mild Left hydroureteronephrosis   Left obstructive ureteral calculus  RICKEY  hypothyroidism  HTN  HLD  T2DM    Recommendations  Continue with IV zosyn  repeat lactic acid, continue IV fluids  Repeat blood culture 3/7  monitor cr  Maintain BS >140<180

## 2021-03-05 NOTE — PROGRESS NOTE ADULT - ASSESSMENT
74 y/o female here with prox L ureteral stone, hydro, urosepsis, ^creat    - continue zosyn  - urine cx, blood cx pending  - Appreciate medicine input  - Continue segura until 24 hours since last fever  - F/u blood sugar, continue lantus

## 2021-03-05 NOTE — PACU DISCHARGE NOTE - COMMENTS
As per code sepsis team and surgical house officer patient can be transferred to the floor on 3L02N/C w/cont. O2Sat, HR and BP monitor

## 2021-03-05 NOTE — BRIEF OPERATIVE NOTE - NSICDXBRIEFPREOP_GEN_ALL_CORE_FT
PRE-OP DIAGNOSIS:  Fever due to infection 05-Mar-2021 19:15:41  Sirena Benton  Left nephrolithiasis 05-Mar-2021 19:15:27  Sirena Benton

## 2021-03-05 NOTE — SEPSIS NOTE - ASSESSMENT
Pt was seen and examined at bedside,  was used for translation ID# [id number] Josr.    Pt was given IVF and tylenol.   Pt is AOA x3 on exam.   Blood cx and urine cx are testing from 3/4/21.   Repeat Temp was 99.4 F.   Pt denies any complains exept her mouth is dry.   Pt had urine out put of 500cc in last hour. Post op.   Will recommend to c/w iv abx and gentle hydration as pt has good UOP.   Will send CBC, CMP, MG, Phos, lactate.   Will recommend to monitor pt on surgical floor with tele and pulse oximeter.   Dr. Green house officer is aware and was asked to come and eval pt and aware of the dispo.    Tried to call Daughter but she did not answer the phone.    Pt was seen and examined at bedside,  was used for translation ID# [id number] Josr.    Pt was given IVF and tylenol.   Pt is AOA x3 on exam.   Blood cx and urine cx are testing from 3/4/21.   Repeat Temp was 99.4 F.   Pt denies any complains exept her mouth is dry.   Pt had urine out put of 500cc in last hour. Post op.   Will recommend to c/w iv abx and gentle hydration as pt has good UOP. (and pt was given 3L ivf in the ED for code sepsis.)   Will send CBC, CMP, MG, Phos, lactate.   Will recommend to monitor pt on surgical floor with tele and pulse oximeter.   Dr. Green house officer is aware and was asked to come and eval pt and aware of the dispo.    Tried to call Daughter but she did not answer the phone.

## 2021-03-05 NOTE — PROGRESS NOTE ADULT - SUBJECTIVE AND OBJECTIVE BOX
Seen after OR this morning. , Reports abdominal discomfort.      Vital Signs Last 24 Hrs  T(C): 38.9 (05 Mar 2021 17:46), Max: 38.9 (05 Mar 2021 17:46)  T(F): 102.1 (05 Mar 2021 17:46), Max: 102.1 (05 Mar 2021 17:46)  HR: 99 (05 Mar 2021 17:46) (89 - 122)  BP: 137/59 (05 Mar 2021 17:46) (100/79 - 148/69)  BP(mean): 83 (05 Mar 2021 02:55) (76 - 126)  RR: 18 (05 Mar 2021 17:46) (18 - 31)  SpO2: 96% (05 Mar 2021 17:46) (94% - 100%)                          9.5    17.77 )-----------( 118      ( 05 Mar 2021 02:43 )             30.2     03-05    141  |  108  |  15  ----------------------------<  181<H>  3.3<L>   |  23  |  1.05    Ca    8.7      05 Mar 2021 02:43  Phos  2.9     03-05  Mg     1.2     03-05    TPro  5.8<L>  /  Alb  2.3<L>  /  TBili  0.5  /  DBili  x   /  AST  23  /  ALT  18  /  AlkPhos  47  03-05    OE  NAD  RRR S1s2  Clear lungs, no rales or rhonchi  Abd tender, +BS  Awake, alert,   no pedal edema

## 2021-03-06 LAB
A1C WITH ESTIMATED AVERAGE GLUCOSE RESULT: 6.9 % — HIGH (ref 4–5.6)
ANION GAP SERPL CALC-SCNC: 13 MMOL/L — SIGNIFICANT CHANGE UP (ref 5–17)
BASOPHILS # BLD AUTO: 0.05 K/UL — SIGNIFICANT CHANGE UP (ref 0–0.2)
BASOPHILS NFR BLD AUTO: 0.4 % — SIGNIFICANT CHANGE UP (ref 0–2)
BUN SERPL-MCNC: 13 MG/DL — SIGNIFICANT CHANGE UP (ref 7–18)
CALCIUM SERPL-MCNC: 7.8 MG/DL — LOW (ref 8.4–10.5)
CHLORIDE SERPL-SCNC: 107 MMOL/L — SIGNIFICANT CHANGE UP (ref 96–108)
CO2 SERPL-SCNC: 22 MMOL/L — SIGNIFICANT CHANGE UP (ref 22–31)
CREAT SERPL-MCNC: 0.88 MG/DL — SIGNIFICANT CHANGE UP (ref 0.5–1.3)
EOSINOPHIL # BLD AUTO: 0.11 K/UL — SIGNIFICANT CHANGE UP (ref 0–0.5)
EOSINOPHIL NFR BLD AUTO: 0.9 % — SIGNIFICANT CHANGE UP (ref 0–6)
ESTIMATED AVERAGE GLUCOSE: 151 MG/DL — HIGH (ref 68–114)
GLUCOSE SERPL-MCNC: 104 MG/DL — HIGH (ref 70–99)
HCT VFR BLD CALC: 32 % — LOW (ref 34.5–45)
HGB BLD-MCNC: 10.4 G/DL — LOW (ref 11.5–15.5)
IMM GRANULOCYTES NFR BLD AUTO: 1.3 % — SIGNIFICANT CHANGE UP (ref 0–1.5)
LACTATE SERPL-SCNC: 1.4 MMOL/L — SIGNIFICANT CHANGE UP (ref 0.7–2)
LYMPHOCYTES # BLD AUTO: 1.06 K/UL — SIGNIFICANT CHANGE UP (ref 1–3.3)
LYMPHOCYTES # BLD AUTO: 8.6 % — LOW (ref 13–44)
MAGNESIUM SERPL-MCNC: 1.7 MG/DL — SIGNIFICANT CHANGE UP (ref 1.6–2.6)
MCHC RBC-ENTMCNC: 27.7 PG — SIGNIFICANT CHANGE UP (ref 27–34)
MCHC RBC-ENTMCNC: 32.5 GM/DL — SIGNIFICANT CHANGE UP (ref 32–36)
MCV RBC AUTO: 85.3 FL — SIGNIFICANT CHANGE UP (ref 80–100)
MONOCYTES # BLD AUTO: 0.78 K/UL — SIGNIFICANT CHANGE UP (ref 0–0.9)
MONOCYTES NFR BLD AUTO: 6.4 % — SIGNIFICANT CHANGE UP (ref 2–14)
NEUTROPHILS # BLD AUTO: 10.11 K/UL — HIGH (ref 1.8–7.4)
NEUTROPHILS NFR BLD AUTO: 82.4 % — HIGH (ref 43–77)
NRBC # BLD: 0 /100 WBCS — SIGNIFICANT CHANGE UP (ref 0–0)
PHOSPHATE SERPL-MCNC: 2 MG/DL — LOW (ref 2.5–4.5)
PLATELET # BLD AUTO: 93 K/UL — LOW (ref 150–400)
POTASSIUM SERPL-MCNC: 3.2 MMOL/L — LOW (ref 3.5–5.3)
POTASSIUM SERPL-SCNC: 3.2 MMOL/L — LOW (ref 3.5–5.3)
RBC # BLD: 3.75 M/UL — LOW (ref 3.8–5.2)
RBC # FLD: 14.3 % — SIGNIFICANT CHANGE UP (ref 10.3–14.5)
SODIUM SERPL-SCNC: 142 MMOL/L — SIGNIFICANT CHANGE UP (ref 135–145)
WBC # BLD: 12.27 K/UL — HIGH (ref 3.8–10.5)
WBC # FLD AUTO: 12.27 K/UL — HIGH (ref 3.8–10.5)

## 2021-03-06 PROCEDURE — 99232 SBSQ HOSP IP/OBS MODERATE 35: CPT | Mod: AI

## 2021-03-06 PROCEDURE — 99222 1ST HOSP IP/OBS MODERATE 55: CPT

## 2021-03-06 RX ORDER — HALOPERIDOL DECANOATE 100 MG/ML
2.5 INJECTION INTRAMUSCULAR ONCE
Refills: 0 | Status: COMPLETED | OUTPATIENT
Start: 2021-03-06 | End: 2021-03-06

## 2021-03-06 RX ORDER — POTASSIUM CHLORIDE 20 MEQ
20 PACKET (EA) ORAL
Refills: 0 | Status: COMPLETED | OUTPATIENT
Start: 2021-03-06 | End: 2021-03-06

## 2021-03-06 RX ORDER — POTASSIUM PHOSPHATE, MONOBASIC POTASSIUM PHOSPHATE, DIBASIC 236; 224 MG/ML; MG/ML
15 INJECTION, SOLUTION INTRAVENOUS ONCE
Refills: 0 | Status: COMPLETED | OUTPATIENT
Start: 2021-03-06 | End: 2021-03-06

## 2021-03-06 RX ORDER — QUETIAPINE FUMARATE 200 MG/1
25 TABLET, FILM COATED ORAL AT BEDTIME
Refills: 0 | Status: DISCONTINUED | OUTPATIENT
Start: 2021-03-06 | End: 2021-03-07

## 2021-03-06 RX ORDER — HALOPERIDOL DECANOATE 100 MG/ML
2.5 INJECTION INTRAMUSCULAR EVERY 6 HOURS
Refills: 0 | Status: DISCONTINUED | OUTPATIENT
Start: 2021-03-06 | End: 2021-03-07

## 2021-03-06 RX ADMIN — POTASSIUM PHOSPHATE, MONOBASIC POTASSIUM PHOSPHATE, DIBASIC 62.5 MILLIMOLE(S): 236; 224 INJECTION, SOLUTION INTRAVENOUS at 09:12

## 2021-03-06 RX ADMIN — INSULIN GLARGINE 5 UNIT(S): 100 INJECTION, SOLUTION SUBCUTANEOUS at 21:23

## 2021-03-06 RX ADMIN — Medication 75 MICROGRAM(S): at 05:41

## 2021-03-06 RX ADMIN — QUETIAPINE FUMARATE 25 MILLIGRAM(S): 200 TABLET, FILM COATED ORAL at 21:24

## 2021-03-06 RX ADMIN — PIPERACILLIN AND TAZOBACTAM 25 GRAM(S): 4; .5 INJECTION, POWDER, LYOPHILIZED, FOR SOLUTION INTRAVENOUS at 21:20

## 2021-03-06 RX ADMIN — HEPARIN SODIUM 5000 UNIT(S): 5000 INJECTION INTRAVENOUS; SUBCUTANEOUS at 21:23

## 2021-03-06 RX ADMIN — Medication 2: at 12:00

## 2021-03-06 RX ADMIN — Medication 20 MILLIEQUIVALENT(S): at 14:10

## 2021-03-06 RX ADMIN — Medication 2: at 17:00

## 2021-03-06 RX ADMIN — HALOPERIDOL DECANOATE 2.5 MILLIGRAM(S): 100 INJECTION INTRAMUSCULAR at 21:20

## 2021-03-06 RX ADMIN — HEPARIN SODIUM 5000 UNIT(S): 5000 INJECTION INTRAVENOUS; SUBCUTANEOUS at 14:10

## 2021-03-06 RX ADMIN — MORPHINE SULFATE 2 MILLIGRAM(S): 50 CAPSULE, EXTENDED RELEASE ORAL at 10:15

## 2021-03-06 RX ADMIN — MORPHINE SULFATE 2 MILLIGRAM(S): 50 CAPSULE, EXTENDED RELEASE ORAL at 09:37

## 2021-03-06 RX ADMIN — HEPARIN SODIUM 5000 UNIT(S): 5000 INJECTION INTRAVENOUS; SUBCUTANEOUS at 05:42

## 2021-03-06 RX ADMIN — HALOPERIDOL DECANOATE 2.5 MILLIGRAM(S): 100 INJECTION INTRAMUSCULAR at 11:59

## 2021-03-06 RX ADMIN — PIPERACILLIN AND TAZOBACTAM 25 GRAM(S): 4; .5 INJECTION, POWDER, LYOPHILIZED, FOR SOLUTION INTRAVENOUS at 05:42

## 2021-03-06 RX ADMIN — PIPERACILLIN AND TAZOBACTAM 25 GRAM(S): 4; .5 INJECTION, POWDER, LYOPHILIZED, FOR SOLUTION INTRAVENOUS at 14:10

## 2021-03-06 RX ADMIN — HALOPERIDOL DECANOATE 2.5 MILLIGRAM(S): 100 INJECTION INTRAMUSCULAR at 09:12

## 2021-03-06 NOTE — CHART NOTE - NSCHARTNOTEFT_GEN_A_CORE
Called by RN that patient agitated and attempting to get out of bed and pull lines.   Patient given Haldol 2.5 mg x2 earlier today. Per team patient still agitated and uncooperative.   Patient seen at bedside and noted to have perisistent agitation and attempting to get out of bed.   Discussed with medical consultant Dr. Sosa. Per recommendations EKG obtained and QT interval WNL.  Per discussion Seroquel 25mg QHS ordered and Haldol 2.5mg Q6 PRN added for agitation.   Mittens ordered while pulling at lines. Will de-escalate when patient less agitated and at baseline.

## 2021-03-06 NOTE — PROGRESS NOTE ADULT - ASSESSMENT
75 year old with PMH of HTN, HLD, hypothyroidism admitted with clinical features keeping with acute septic encephalopathy from a UTI, left pyelonephritis, a large left obstructive ureteral calculus with left hydroureteronephrosis with concern for ascending infection.  Pt admitted to the Surgery-Urology service and for urgent urologic intervention. Hospital Medicine consultation requested for darren-op and post op care /management    Impression  75 year old woman with PMH as detailed above admitted acutely with fevers and changes in mental status all c/w acute septic encephalopathy  from UTI/ left pyelonephritis, a large left obstructive ureteral calculus with left hydroureteronephrosis with concern for ascending infection s/p urological procedure     Assessment  Gram negative Bacteremia on IV Zosyn  Acute encephalopathy likely secondary to sepsis  Left pyelonephritis   Mild Left hydroureteronephrosis   Left obstructive ureteral calculus  RICKEY cr improving  hypothyroidism  HTN  HLD  T2DM    Recommendations  Continue with IV zosyn will deescalate antibiotics pending final cultures  Repeat blood culture 3/6  monitor cr  Maintain BS >140<180  Frequent reorientation, enhanced supervison  Seroquel 25 mg Qhs, may use  Haldol 2.5 mg Q6PRN for severe agitation with monitoring of QTC, hold  for QTC >500  Spoke with patient s daughter discussed the plan of care.   Discussed with Urology PA         75 year old with PMH of HTN, HLD, hypothyroidism admitted with clinical features keeping with acute septic encephalopathy from a UTI, left pyelonephritis, a large left obstructive ureteral calculus with left hydroureteronephrosis with concern for ascending infection.  Pt admitted for fevers and changes in mental status all c/w acute septic encephalopathy  from UTI/ left pyelonephritis, a large left obstructive ureteral calculus with left hydroureteronephrosis with concern for ascending infection. She  s/p urological procedure on 3/5    Assessment  Gram negative Bacteremia on IV Zosyn  Acute encephalopathy likely secondary to sepsis  Left pyelonephritis   Mild Left hydroureteronephrosis   Left obstructive ureteral calculus  RICKEY cr improving  hypothyroidism  HTN  HLD  T2DM    Recommendations  Continue with IV zosyn will deescalate antibiotics pending final cultures  Repeat blood culture 3/6  monitor cr  Maintain BS >140<180  Frequent reorientation, enhanced supervison  Seroquel 25 mg Qhs, may use  Haldol 2.5 mg Q6PRN for severe agitation with monitoring of QTC, hold  for QTC >500  Spoke with patient s daughter discussed the plan of care.   Discussed with Urology PA

## 2021-03-06 NOTE — PROGRESS NOTE ADULT - SUBJECTIVE AND OBJECTIVE BOX
75 year old with PMH of HTN, HLD, hypothyroidism admitted with clinical features keeping with acute septic encephalopathy from a UTI, left pyelonephritis, a large left obstructive ureteral calculus with left hydroureteronephrosis with concern for ascending infection.  Pt admitted to the Surgery-Urology service and for urgent urologic intervention. Hospital Medicine consultation requested for darren-op and post op care /management    Seen this morning. Requesting water. Denies abdominal pain. last fever spike yesterday 5.30 pm. leukocytosis trending down    Vital Signs Last 24 Hrs  T(C): 36.7 (06 Mar 2021 13:15), Max: 38.9 (05 Mar 2021 17:46)  T(F): 98.1 (06 Mar 2021 13:15), Max: 102.1 (05 Mar 2021 17:46)  HR: 99 (06 Mar 2021 13:15) (87 - 99)  BP: 146/56 (06 Mar 2021 13:15) (117/61 - 146/56)  BP(mean): --  RR: 18 (06 Mar 2021 13:15) (17 - 18)  SpO2: 95% (06 Mar 2021 13:15) (95% - 97%)                          10.4   12.27 )-----------( 93       ( 06 Mar 2021 06:14 )             32.0       03-06    142  |  107  |  13  ----------------------------<  104<H>  3.2<L>   |  22  |  0.88    Ca    7.8<L>      06 Mar 2021 06:14  Phos  2.0     03-06  Mg     1.7     03-06    TPro  5.8<L>  /  Alb  2.3<L>  /  TBili  0.5  /  DBili  x   /  AST  23  /  ALT  18  /  AlkPhos  47  03-05         OE  NAD  RRR S1s2  Clear lungs, no rales or rhonchi  Abd soft , NT+BS  Awake, alert oriented to person only  no pedal edema

## 2021-03-06 NOTE — PROGRESS NOTE ADULT - ASSESSMENT
74 y/o female here with prox L ureteral stone, hydro, urosepsis, ^creat s/p cysto/stent 3/5'  Hypokalemia  Hypophosphatemia    - continue zosyn  - urine cx, blood cx pending  - Appreciate medicine input  - Continue segura   - F/u blood sugar, continue lantus  -replete K, Ph 74 y/o female here with prox L ureteral stone, hydro, urosepsis, ^creat s/p cysto/stent 3/5'  Leukocytosis downtrending  Hypokalemia  Hypophosphatemia    - continue zosyn  - urine cx, blood cx pending  - Appreciate medicine input  - Continue segura   - F/u blood sugar, continue lantus  -replete K, Ph

## 2021-03-06 NOTE — PROGRESS NOTE ADULT - SUBJECTIVE AND OBJECTIVE BOX
Post Operative Day #: 1    SUBJECTIVE: 75y Female with urosepsis s/p cysto/stent for L prox 1.3cm stone    INTERVAL HPI/OVERNIGHT EVENTS:    Per RN, pt more confused than yesterday  Pt unable to answer questions      MEDICATIONS  (STANDING):  dextrose 40% Gel 15 Gram(s) Oral once  dextrose 5%. 1000 milliLiter(s) (100 mL/Hr) IV Continuous <Continuous>  dextrose 5%. 1000 milliLiter(s) (50 mL/Hr) IV Continuous <Continuous>  dextrose 50% Injectable 25 Gram(s) IV Push once  dextrose 50% Injectable 12.5 Gram(s) IV Push once  dextrose 50% Injectable 25 Gram(s) IV Push once  glucagon  Injectable 1 milliGRAM(s) IntraMuscular once  heparin   Injectable 5000 Unit(s) SubCutaneous every 8 hours  insulin glargine Injectable (LANTUS) 5 Unit(s) SubCutaneous at bedtime  insulin lispro (ADMELOG) corrective regimen sliding scale   SubCutaneous every 6 hours  lactated ringers. 1000 milliLiter(s) (75 mL/Hr) IV Continuous <Continuous>  levothyroxine Injectable 75 MICROGram(s) IV Push <User Schedule>  piperacillin/tazobactam IVPB.. 3.375 Gram(s) IV Intermittent every 8 hours  QUEtiapine 25 milliGRAM(s) Oral at bedtime    MEDICATIONS  (PRN):  acetaminophen   Tablet .. 650 milliGRAM(s) Oral every 6 hours PRN Temp greater or equal to 38C (100.4F), Mild Pain (1 - 3)  haloperidol    Injectable 2.5 milliGRAM(s) IV Push every 6 hours PRN agitation  morphine  - Injectable 2 milliGRAM(s) IV Push every 4 hours PRN Moderate Pain (4 - 6)  ondansetron Injectable 4 milliGRAM(s) IV Push every 6 hours PRN Nausea and/or Vomiting        OBJECTIVE:  Vital Signs Last 24 Hrs  T(C): 36.7 (06 Mar 2021 13:15), Max: 37.4 (05 Mar 2021 21:42)  T(F): 98.1 (06 Mar 2021 13:15), Max: 99.3 (05 Mar 2021 21:42)  HR: 99 (06 Mar 2021 13:15) (87 - 99)  BP: 146/56 (06 Mar 2021 13:15) (117/61 - 146/56)  BP(mean): --  RR: 18 (06 Mar 2021 13:15) (17 - 18)  SpO2: 95% (06 Mar 2021 13:15) (95% - 97%)    Physical Exam:    Gen: awake, not alert or oriented NAD  Abdomen: soft NT ND  Pelvic: + Juares catheter in place with clear urine  Extremities: warm to touch no c/c/e            I&O's Detail    05 Mar 2021 07:01  -  06 Mar 2021 07:00  --------------------------------------------------------  IN:  Total IN: 0 mL    OUT:    Indwelling Catheter - Urethral (mL): 1950 mL  Total OUT: 1950 mL    Total NET: -1950 mL      06 Mar 2021 07:01  -  06 Mar 2021 18:31  --------------------------------------------------------  IN:  Total IN: 0 mL    OUT:    Indwelling Catheter - Urethral (mL): 500 mL  Total OUT: 500 mL    Total NET: -500 mL          Labs:                          10.4   12.27 )-----------( 93       ( 06 Mar 2021 06:14 )             32.0     03-06    142  |  107  |  13  ----------------------------<  104<H>  3.2<L>   |  22  |  0.88    Ca    7.8<L>      06 Mar 2021 06:14  Phos  2.0     03-06  Mg     1.7     03-06    TPro  5.8<L>  /  Alb  2.3<L>  /  TBili  0.5  /  DBili  x   /  AST  23  /  ALT  18  /  AlkPhos  47  03-05

## 2021-03-07 LAB
-  AMIKACIN: SIGNIFICANT CHANGE UP
-  AMIKACIN: SIGNIFICANT CHANGE UP
-  AMOXICILLIN/CLAVULANIC ACID: SIGNIFICANT CHANGE UP
-  AMPICILLIN/SULBACTAM: SIGNIFICANT CHANGE UP
-  AMPICILLIN/SULBACTAM: SIGNIFICANT CHANGE UP
-  AMPICILLIN: SIGNIFICANT CHANGE UP
-  AMPICILLIN: SIGNIFICANT CHANGE UP
-  AZTREONAM: SIGNIFICANT CHANGE UP
-  AZTREONAM: SIGNIFICANT CHANGE UP
-  CEFAZOLIN: SIGNIFICANT CHANGE UP
-  CEFAZOLIN: SIGNIFICANT CHANGE UP
-  CEFEPIME: SIGNIFICANT CHANGE UP
-  CEFEPIME: SIGNIFICANT CHANGE UP
-  CEFOXITIN: SIGNIFICANT CHANGE UP
-  CEFOXITIN: SIGNIFICANT CHANGE UP
-  CEFTRIAXONE: SIGNIFICANT CHANGE UP
-  CEFTRIAXONE: SIGNIFICANT CHANGE UP
-  CIPROFLOXACIN: SIGNIFICANT CHANGE UP
-  CIPROFLOXACIN: SIGNIFICANT CHANGE UP
-  ERTAPENEM: SIGNIFICANT CHANGE UP
-  ERTAPENEM: SIGNIFICANT CHANGE UP
-  GENTAMICIN: SIGNIFICANT CHANGE UP
-  GENTAMICIN: SIGNIFICANT CHANGE UP
-  IMIPENEM: SIGNIFICANT CHANGE UP
-  LEVOFLOXACIN: SIGNIFICANT CHANGE UP
-  LEVOFLOXACIN: SIGNIFICANT CHANGE UP
-  MEROPENEM: SIGNIFICANT CHANGE UP
-  MEROPENEM: SIGNIFICANT CHANGE UP
-  NITROFURANTOIN: SIGNIFICANT CHANGE UP
-  PIPERACILLIN/TAZOBACTAM: SIGNIFICANT CHANGE UP
-  PIPERACILLIN/TAZOBACTAM: SIGNIFICANT CHANGE UP
-  TIGECYCLINE: SIGNIFICANT CHANGE UP
-  TOBRAMYCIN: SIGNIFICANT CHANGE UP
-  TOBRAMYCIN: SIGNIFICANT CHANGE UP
-  TRIMETHOPRIM/SULFAMETHOXAZOLE: SIGNIFICANT CHANGE UP
-  TRIMETHOPRIM/SULFAMETHOXAZOLE: SIGNIFICANT CHANGE UP
ANION GAP SERPL CALC-SCNC: 11 MMOL/L — SIGNIFICANT CHANGE UP (ref 5–17)
BASOPHILS # BLD AUTO: 0.03 K/UL — SIGNIFICANT CHANGE UP (ref 0–0.2)
BASOPHILS NFR BLD AUTO: 0.4 % — SIGNIFICANT CHANGE UP (ref 0–2)
BUN SERPL-MCNC: 10 MG/DL — SIGNIFICANT CHANGE UP (ref 7–18)
CALCIUM SERPL-MCNC: 7.9 MG/DL — LOW (ref 8.4–10.5)
CHLORIDE SERPL-SCNC: 102 MMOL/L — SIGNIFICANT CHANGE UP (ref 96–108)
CO2 SERPL-SCNC: 25 MMOL/L — SIGNIFICANT CHANGE UP (ref 22–31)
CREAT SERPL-MCNC: 0.74 MG/DL — SIGNIFICANT CHANGE UP (ref 0.5–1.3)
CREAT SERPL-MCNC: 0.77 MG/DL — SIGNIFICANT CHANGE UP (ref 0.5–1.3)
CULTURE RESULTS: SIGNIFICANT CHANGE UP
EOSINOPHIL # BLD AUTO: 0.09 K/UL — SIGNIFICANT CHANGE UP (ref 0–0.5)
EOSINOPHIL NFR BLD AUTO: 1.1 % — SIGNIFICANT CHANGE UP (ref 0–6)
GLUCOSE BLDC GLUCOMTR-MCNC: 149 MG/DL — HIGH (ref 70–99)
GLUCOSE BLDC GLUCOMTR-MCNC: 161 MG/DL — HIGH (ref 70–99)
GLUCOSE SERPL-MCNC: 149 MG/DL — HIGH (ref 70–99)
HCT VFR BLD CALC: 29.2 % — LOW (ref 34.5–45)
HGB BLD-MCNC: 9.8 G/DL — LOW (ref 11.5–15.5)
IMM GRANULOCYTES NFR BLD AUTO: 0.8 % — SIGNIFICANT CHANGE UP (ref 0–1.5)
LYMPHOCYTES # BLD AUTO: 0.87 K/UL — LOW (ref 1–3.3)
LYMPHOCYTES # BLD AUTO: 10.3 % — LOW (ref 13–44)
MAGNESIUM SERPL-MCNC: 1.6 MG/DL — SIGNIFICANT CHANGE UP (ref 1.6–2.6)
MCHC RBC-ENTMCNC: 27.3 PG — SIGNIFICANT CHANGE UP (ref 27–34)
MCHC RBC-ENTMCNC: 33.6 GM/DL — SIGNIFICANT CHANGE UP (ref 32–36)
MCV RBC AUTO: 81.3 FL — SIGNIFICANT CHANGE UP (ref 80–100)
METHOD TYPE: SIGNIFICANT CHANGE UP
METHOD TYPE: SIGNIFICANT CHANGE UP
MONOCYTES # BLD AUTO: 0.89 K/UL — SIGNIFICANT CHANGE UP (ref 0–0.9)
MONOCYTES NFR BLD AUTO: 10.5 % — SIGNIFICANT CHANGE UP (ref 2–14)
NEUTROPHILS # BLD AUTO: 6.51 K/UL — SIGNIFICANT CHANGE UP (ref 1.8–7.4)
NEUTROPHILS NFR BLD AUTO: 76.9 % — SIGNIFICANT CHANGE UP (ref 43–77)
NRBC # BLD: 0 /100 WBCS — SIGNIFICANT CHANGE UP (ref 0–0)
ORGANISM # SPEC MICROSCOPIC CNT: SIGNIFICANT CHANGE UP
PHOSPHATE SERPL-MCNC: 2.1 MG/DL — LOW (ref 2.5–4.5)
PLATELET # BLD AUTO: 124 K/UL — LOW (ref 150–400)
POTASSIUM SERPL-MCNC: 2.5 MMOL/L — CRITICAL LOW (ref 3.5–5.3)
POTASSIUM SERPL-SCNC: 2.5 MMOL/L — CRITICAL LOW (ref 3.5–5.3)
RBC # BLD: 3.59 M/UL — LOW (ref 3.8–5.2)
RBC # FLD: 14.1 % — SIGNIFICANT CHANGE UP (ref 10.3–14.5)
SODIUM SERPL-SCNC: 138 MMOL/L — SIGNIFICANT CHANGE UP (ref 135–145)
SPECIMEN SOURCE: SIGNIFICANT CHANGE UP
WBC # BLD: 8.46 K/UL — SIGNIFICANT CHANGE UP (ref 3.8–10.5)
WBC # FLD AUTO: 8.46 K/UL — SIGNIFICANT CHANGE UP (ref 3.8–10.5)

## 2021-03-07 PROCEDURE — 99233 SBSQ HOSP IP/OBS HIGH 50: CPT | Mod: GC

## 2021-03-07 PROCEDURE — 99232 SBSQ HOSP IP/OBS MODERATE 35: CPT

## 2021-03-07 RX ORDER — INSULIN LISPRO 100/ML
VIAL (ML) SUBCUTANEOUS AT BEDTIME
Refills: 0 | Status: DISCONTINUED | OUTPATIENT
Start: 2021-03-07 | End: 2021-03-09

## 2021-03-07 RX ORDER — INSULIN LISPRO 100/ML
VIAL (ML) SUBCUTANEOUS
Refills: 0 | Status: DISCONTINUED | OUTPATIENT
Start: 2021-03-07 | End: 2021-03-09

## 2021-03-07 RX ORDER — CEFTRIAXONE 500 MG/1
INJECTION, POWDER, FOR SOLUTION INTRAMUSCULAR; INTRAVENOUS
Refills: 0 | Status: DISCONTINUED | OUTPATIENT
Start: 2021-03-07 | End: 2021-03-09

## 2021-03-07 RX ORDER — CEFTRIAXONE 500 MG/1
2000 INJECTION, POWDER, FOR SOLUTION INTRAMUSCULAR; INTRAVENOUS ONCE
Refills: 0 | Status: COMPLETED | OUTPATIENT
Start: 2021-03-07 | End: 2021-03-07

## 2021-03-07 RX ORDER — CEFTRIAXONE 500 MG/1
2000 INJECTION, POWDER, FOR SOLUTION INTRAMUSCULAR; INTRAVENOUS EVERY 24 HOURS
Refills: 0 | Status: DISCONTINUED | OUTPATIENT
Start: 2021-03-08 | End: 2021-03-09

## 2021-03-07 RX ORDER — POTASSIUM PHOSPHATE, MONOBASIC POTASSIUM PHOSPHATE, DIBASIC 236; 224 MG/ML; MG/ML
15 INJECTION, SOLUTION INTRAVENOUS ONCE
Refills: 0 | Status: COMPLETED | OUTPATIENT
Start: 2021-03-07 | End: 2021-03-07

## 2021-03-07 RX ORDER — POTASSIUM CHLORIDE 20 MEQ
10 PACKET (EA) ORAL
Refills: 0 | Status: COMPLETED | OUTPATIENT
Start: 2021-03-07 | End: 2021-03-07

## 2021-03-07 RX ADMIN — HEPARIN SODIUM 5000 UNIT(S): 5000 INJECTION INTRAVENOUS; SUBCUTANEOUS at 13:17

## 2021-03-07 RX ADMIN — Medication 75 MICROGRAM(S): at 05:50

## 2021-03-07 RX ADMIN — Medication 100 MILLIEQUIVALENT(S): at 10:36

## 2021-03-07 RX ADMIN — HEPARIN SODIUM 5000 UNIT(S): 5000 INJECTION INTRAVENOUS; SUBCUTANEOUS at 21:23

## 2021-03-07 RX ADMIN — PIPERACILLIN AND TAZOBACTAM 25 GRAM(S): 4; .5 INJECTION, POWDER, LYOPHILIZED, FOR SOLUTION INTRAVENOUS at 05:50

## 2021-03-07 RX ADMIN — HEPARIN SODIUM 5000 UNIT(S): 5000 INJECTION INTRAVENOUS; SUBCUTANEOUS at 05:51

## 2021-03-07 RX ADMIN — Medication 2: at 11:49

## 2021-03-07 RX ADMIN — Medication 4: at 05:51

## 2021-03-07 RX ADMIN — POTASSIUM PHOSPHATE, MONOBASIC POTASSIUM PHOSPHATE, DIBASIC 62.5 MILLIMOLE(S): 236; 224 INJECTION, SOLUTION INTRAVENOUS at 15:45

## 2021-03-07 RX ADMIN — Medication 100 MILLIEQUIVALENT(S): at 13:58

## 2021-03-07 RX ADMIN — Medication 100 MILLIEQUIVALENT(S): at 11:48

## 2021-03-07 RX ADMIN — CEFTRIAXONE 100 MILLIGRAM(S): 500 INJECTION, POWDER, FOR SOLUTION INTRAMUSCULAR; INTRAVENOUS at 13:17

## 2021-03-07 RX ADMIN — INSULIN GLARGINE 5 UNIT(S): 100 INJECTION, SOLUTION SUBCUTANEOUS at 21:51

## 2021-03-07 NOTE — CONSULT NOTE ADULT - ASSESSMENT
E. coli Bacteremia   Acute encephalopathy likely secondary to sepsis, hospital acquired delirium   Left pyelonephritis   Mild Left hydroureteronephrosis   Left obstructive ureteral calculus s/p stent 3/5  RICKEY cr improving  Normocytic anemia  Hypothyroidism  HTN/ HLD  T2DM    Recommendations  Changed Zosyn to Ceftriaxone as per sensitivites  Follow repeat blood culture 3/6  Can DC Haldol and Seroquel   monitor cr  Maintain BS >140-180  Frequent reorientation, enhanced supervision  DC Juares if urology agrees  Send Iron profile, B12, Folate and supplement if deficiency noted  Will cont to follow     E. coli Bacteremia   Acute encephalopathy likely secondary to sepsis, hospital acquired delirium   Left pyelonephritis   Mild Left hydroureteronephrosis   Left obstructive ureteral calculus s/p stent 3/5  RICKEY cr improving  Normocytic anemia  Hypothyroidism  HTN/ HLD  T2DM    Recommendations  Changed Zosyn to Ceftriaxone as per sensitivites  Follow repeat blood culture 3/6  Can DC Haldol and Seroquel   monitor cr  Maintain BS >140-180  Frequent reorientation, enhanced supervision  DC Juares if urology agrees  Send Iron profile, B12, Folate and supplement if deficiency noted  Will cont to follow    Updated patient's daughter

## 2021-03-07 NOTE — PROGRESS NOTE ADULT - ASSESSMENT
76 y/o female here with prox L ureteral stone, hydro, urosepsis, ^creat s/p cysto/stent 3/5'  wbc nl  Hypokalemia  Hypophosphatemia    - continue zosyn  - urine cx, blood cx pending  - D/w Dr. Richard - ok to transfer to medicine  - Continue Juares  - F/u blood sugar, continue lantus  - replete K, Ph  - Recommend to f/u with Dr. Benton as outpatient for evaluation and stent removal

## 2021-03-07 NOTE — CONSULT NOTE ADULT - SUBJECTIVE AND OBJECTIVE BOX
Patient is a 75y old  Female who presents with a chief complaint of sepsis, ureterolithiasis, left pyelonephritis and hydroureteronephrosis. S/p cysto and ureteric stent placement in left ureter on 3/5. Medicine consult was called for darren-operative management.    Yesterday patient was agitated, delirious, requiring few doses of Haldol. Started on Seroquel. Blood culture is positive for E.Coli pansensitive.     Patient was seen and examined at bedside this morning  Denies any complains   Reports she was agitated yesterday because she was not aware that she is in hospital  [facility] now, reports had breakfast, wants to sit on the chair  Discussed about having infection in blood and importance to get IV antibiotics   Wants daughter to be involved in care       INTERVAL HPI/OVERNIGHT EVENTS:  T(C): 37.6 (03-07-21 @ 05:05), Max: 37.6 (03-06-21 @ 20:42)  HR: 98 (03-07-21 @ 05:05) (83 - 99)  BP: 163/79 (03-07-21 @ 05:05) (134/89 - 163/79)  RR: 18 (03-07-21 @ 05:05) (18 - 18)  SpO2: 100% (03-07-21 @ 05:05) (95% - 100%)  Wt(kg): --  I&O's Summary    06 Mar 2021 07:01  -  07 Mar 2021 07:00  --------------------------------------------------------  IN: 0 mL / OUT: 2400 mL / NET: -2400 mL    07 Mar 2021 07:01  -  07 Mar 2021 11:35  --------------------------------------------------------  IN: 0 mL / OUT: 550 mL / NET: -550 mL        REVIEW OF SYSTEMS: denies fever, chills, SOB, palpitations, chest pain, abdominal pain, nausea, vomiting, diarrhea, constipation, dizziness    MEDICATIONS  (STANDING):  dextrose 40% Gel 15 Gram(s) Oral once  dextrose 5%. 1000 milliLiter(s) (100 mL/Hr) IV Continuous <Continuous>  dextrose 5%. 1000 milliLiter(s) (50 mL/Hr) IV Continuous <Continuous>  dextrose 50% Injectable 25 Gram(s) IV Push once  dextrose 50% Injectable 12.5 Gram(s) IV Push once  dextrose 50% Injectable 25 Gram(s) IV Push once  glucagon  Injectable 1 milliGRAM(s) IntraMuscular once  heparin   Injectable 5000 Unit(s) SubCutaneous every 8 hours  insulin glargine Injectable (LANTUS) 5 Unit(s) SubCut/aneous at bedtime  insulin lispro (ADMELOG) corrective regimen sliding scale   SubCutaneous every 6 hours  lactated ringers. 1000 milliLiter(s) (75 mL/Hr) IV Continuous <Continuous>  levothyroxine Injectable 75 MICROGram(s) IV Push <User Schedule>  piperacillin/tazobactam IVPB.. 3.375 Gram(s) IV Intermittent every 8 hours  potassium chloride  10 mEq/100 mL IVPB 10 milliEquivalent(s) IV Intermittent every 1 hour  potassium phosphate IVPB 15 milliMole(s) IV Intermittent once  QUEtiapine 25 milliGRAM(s) Oral at bedtime    MEDICATIONS  (PRN):  acetaminophen   Tablet .. 650 milliGRAM(s) Oral every 6 hours PRN Temp greater or equal to 38C (100.4F), Mild Pain (1 - 3)  haloperidol    Injectable 2.5 milliGRAM(s) IV Push every 6 hours PRN agitation  morphine  - Injectable 2 milliGRAM(s) IV Push every 4 hours PRN Moderate Pain (4 - 6)  ondansetron Injectable 4 milliGRAM(s) IV Push every 6 hours PRN Nausea and/or Vomiting      PHYSICAL EXAM:  GENERAL: NAD, well-groomed, well-developed, not agitated, co-operative  NERVOUS SYSTEM:  Alert & Oriented X3, Good concentration; Motor Strength 5/5 B/L upper and lower extremities; DTRs 2+ intact and symmetric  CHEST/LUNG: Clear to auscultation bilaterally; No rales, rhonchi, wheezing, or rubs  HEART: Regular rate and rhythm; No murmurs, rubs, or gallops  ABDOMEN: Soft, Nontender, Nondistended; Bowel sounds present, no renal angle tenderness  EXTREMITIES:  2+ Peripheral Pulses, No clubbing, cyanosis, or edema  SKIN: No rashes or lesions    LABS:                        9.8    8.46  )-----------( 124      ( 07 Mar 2021 07:42 )             29.2     03-07    138  |  102  |  10  ----------------------------<  149<H>  2.5<LL>   |  25  |  0.77    Ca    7.9<L>      07 Mar 2021 07:42  Phos  2.1     03-07  Mg     1.6     03-07          CAPILLARY BLOOD GLUCOSE      POCT Blood Glucose.: 207 mg/dL (07 Mar 2021 05:48)  POCT Blood Glucose.: 140 mg/dL (06 Mar 2021 21:28)  POCT Blood Glucose.: 160 mg/dL (06 Mar 2021 16:16)  POCT Blood Glucose.: 172 mg/dL (06 Mar 2021 11:39)        
LOPEZ KRUEGER  75y  Female      Patient is a 75y old  Female who presents with a chief complaint of sepsis, ureterolithiasis (04 Mar 2021 20:21)      HPI:   Patient is 75 year old female (Italian speaking) hx of HTN, HLD, DM, Hypothyroidism, seasonal allergies, past surgical hx of Cholecystectomy, R hip hemiarthroplasty, Nose surgery BIBA for Fever associated with Generalized weakness. Hx was obtained from Daughter Angelic Gilliland. Per jesus pt noted to have abdominal pain on 3/3/21 in the morning but by yesterday evening pt was restless, generalized weakness, not able to sleep and stayed in her bed all day, Intially family thought it might be due to her medication but today morning pt noted to have fever of 101-102 F, pt could not stand up or get out off her bed on her own, so family decided to call EMS and pt was brought to the hospital. At baseline pt is AOA x3, walks with cane, walks outside for about 45 min and does indoor cycling 20 min daily without any problem with breathing or chest pain and also does grocery shopping sometimes. Pt does not have any cardiac condition or surgery in the past, pt did not have any complications during previous surgeries.          REVIEW OF SYSTEMS:  CONSTITUTIONAL: No fever, weight loss, or fatigue  EYES: No eye pain, visual disturbances, or discharge  ENMT:  No difficulty hearing, tinnitus, vertigo; No sinus or throat pain  NECK: No pain or stiffness  BREASTS: No pain, masses, or nipple discharge  RESPIRATORY: No cough, wheezing, chills or hemoptysis; No shortness of breath  CARDIOVASCULAR: No chest pain, palpitations, dizziness, or leg swelling  GASTROINTESTINAL: No abdominal or epigastric pain. No nausea, vomiting, or hematemesis; No diarrhea or constipation. No melena or hematochezia.  GENITOURINARY: No dysuria, frequency, hematuria, or incontinence  NEUROLOGICAL: No headaches, memory loss, loss of strength, numbness, or tremors  SKIN: No itching, burning, rashes, or lesions   LYMPH NODES: No enlarged glands  ENDOCRINE: No heat or cold intolerance; No hair loss  MUSCULOSKELETAL: No joint pain or swelling; No muscle, back, or extremity pain  PSYCHIATRIC: No depression, anxiety, mood swings, or difficulty sleeping  HEME/LYMPH: No easy bruising, or bleeding gums  ALLERY AND IMMUNOLOGIC: No hives or eczema    T(C): 37.5 (21 @ 21:26), Max: 39.3 (21 @ 16:02)  HR: 111 (21 @ 21:26) (97 - 111)  BP: 115/70 (21 @ 21:26) (114/65 - 130/72)  RR: 18 (21 @ 21:26) (18 - 18)  SpO2: 100% (21 @ 21:26) (93% - 100%)  Wt(kg): --Vital Signs Last 24 Hrs  T(C): 37.5 (04 Mar 2021 21:26), Max: 39.3 (04 Mar 2021 16:02)  T(F): 99.5 (04 Mar 2021 21:26), Max: 102.7 (04 Mar 2021 16:02)  HR: 111 (04 Mar 2021 21:) (97 - 111)  BP: 115/70 (04 Mar 2021 21:26) (114/65 - 130/72)  BP(mean): --  RR: 18 (04 Mar 2021 21:26) (18 - 18)  SpO2: 100% (04 Mar 2021 21:26) (93% - 100%)    PHYSICAL EXAM:  GENERAL: NAD, well-groomed, well-developed  HEAD:  Atraumatic, Normocephalic  EYES: EOMI, PERRLA, conjunctiva and sclera clear  ENMT: No tonsillar erythema, exudates, or enlargement; Moist mucous membranes, Good dentition, No lesions  NECK: Supple, No JVD, Normal thyroid  NERVOUS SYSTEM:  Alert & Oriented X1-2, Following commands   CHEST/LUNG: Clear to percussion bilaterally; No rales, rhonchi, wheezing, or rubs  HEART: Regular rate and rhythm; No murmurs, rubs, or gallops  ABDOMEN: Soft, Nontender, Nondistended; Bowel sounds present  Back: left side CVA tenderness +ve   EXTREMITIES:  2+ Peripheral Pulses, No clubbing, cyanosis, or edema  LYMPH: No lymphadenopathy noted  SKIN: No rashes or lesions    Consultant(s) Notes Reviewed:  [x ] YES  [ ] NO  Care Discussed with Consultants/Other Providers [ x] YES  [ ] NO    LABS:                        11.5   17.99 )-----------( 159      ( 04 Mar 2021 16:26 )             35.1     03-04    134<L>  |  101  |  22<H>  ----------------------------<  198<H>  3.6   |  23  |  1.39<H>    Ca    10.0      04 Mar 2021 16:26    TPro  7.5  /  Alb  3.2<L>  /  TBili  0.5  /  DBili  x   /  AST  24  /  ALT  25  /  AlkPhos  48  03-04    PT/INR - ( 04 Mar 2021 16:26 )   PT: 15.0 sec;   INR: 1.28 ratio         PTT - ( 04 Mar 2021 16:26 )  PTT:35.3 sec  Urinalysis Basic - ( 04 Mar 2021 16:41 )    Color: Yellow / Appearance: Slightly Turbid / S.015 / pH: x  Gluc: x / Ketone: Trace  / Bili: Negative / Urobili: Negative   Blood: x / Protein: 30 mg/dL / Nitrite: Positive   Leuk Esterase: Moderate / RBC: 2-5 /HPF / WBC >50 /HPF   Sq Epi: x / Non Sq Epi: Few /HPF / Bacteria: Moderate /HPF      CAPILLARY BLOOD GLUCOSE      POCT Blood Glucose.: 216 mg/dL (04 Mar 2021 15:51)        Urinalysis Basic - ( 04 Mar 2021 16:41 )    Color: Yellow / Appearance: Slightly Turbid / S.015 / pH: x  Gluc: x / Ketone: Trace  / Bili: Negative / Urobili: Negative   Blood: x / Protein: 30 mg/dL / Nitrite: Positive   Leuk Esterase: Moderate / RBC: 2-5 /HPF / WBC >50 /HPF   Sq Epi: x / Non Sq Epi: Few /HPF / Bacteria: Moderate /HPF    EKG: Sinus tachycardia at 106/min     RADIOLOGY & ADDITIONAL TESTS:  < from: CT Head No Cont (21 @ 18:54) >    EXAM:  CT BRAIN                            PROCEDURE DATE:  2021          INTERPRETATION:  .    CLINICAL INFORMATION: Altered mental status.    TECHNIQUE: Multiple axial CT images of the head were obtained without contrast. Sagittal and coronal reconstructed images were acquired from the source data.    COMPARISON: No prior CT studies of the brain are available for comparison at this institution.    FINDINGS: There is no acute intracranial hemorrhage, mass effect, shift of the midline structures, herniation, extra-axial fluid collection, or hydrocephalus.    There is diffuse cerebral volume loss with prominence of the sulci, fissures, and cisternal spaces which is normal for the patient's age. There is mild deep and periventricular white matter hypoattenuation statistically compatible with microvascular changes given calcific atherosclerotic disease of the intracranial arteries.    Scattered mucosal thickening is seen throughout the paranasal sinuses, most notably affecting the left maxillary sinus. The mastoid air cells are clear. The calvarium is intact. There is evidence of bilateral cataract removal.    IMPRESSION: No acute intracranial hemorrhage, mass effect, or shift of the midline structures.    Mild chronic white matter microvascular type changes.            LEVI LEES MD; Attending Radiologist  This document has been electronically signed. Mar  4 2021  7:02PM    < end of copied text >  < from: CT Abdomen and Pelvis w/ IV Cont (21 @ 18:53) >    EXAM:  CT ABDOMEN AND PELVIS IC                            PROCEDURE DATE:  2021          INTERPRETATION:  CLINICAL INFORMATION: Abdominal pain    COMPARISON: None.    CONTRAST/COMPLICATIONS:  IV Contrast: Omnipaque 350 90 cc administered / 10 cc discarded.  Oral Contrast: NONE  Complications: None reported at time of study completion    PROCEDURE:  CT of the Abdomen and Pelvis was performed.  Sagittal and coronal reformats were performed.    FINDINGS:  LOWER CHEST: Mild left basilar atelectasis    LIVER: Fatty  BILE DUCTS: Normal caliber.  GALLBLADDER: Cholecystectomy  SPLEEN: Within normal limits.  PANCREAS: Within normal limits.  ADRENALS: Within normal limits.  KIDNEYS/URETERS: Mild left hydroureteronephrosis secondary to a 1.5 cmcalculus in the proximal third of the left ureter. Augmented urothelial enhancement left renal pelvis and proximal ureter. Correlate clinically for infection. Obstructive left nephrogram  left perirenal stranding, small left perirenal fluid. Left renal cyst    BLADDER: Partially obscured by artifact from right THR. Grossly unremarkable as visualized.  REPRODUCTIVE ORGANS: Unremarkable    BOWEL: No bowel obstruction or inflammation. Appendix no appendicitis  PERITONEUM: No ascites.  VESSELS: Nonaneurysmal  RETROPERITONEUM/LYMPH NODES: No lymphadenopathy.  ABDOMINAL WALL: Within normal limits.  BONES: Degenerative changes.    IMPRESSION:    Mild left hydroureteronephrosis secondary to a 1.5 cm calculus proximal left ureter. Correlate clinicallyfor concomitant infection.  No evidence of appendicitis or colitis            KAYLA NEVAREZ MD; Attending Radiologist  This document has been electronically signed. Mar  4 2021  7:25PM    < end of copied text >  < from: Xray Chest 1 View- PORTABLE-Urgent (21 @ 16:34) >    EXAM:  XR CHEST PORTABLE URGENT 1V                            PROCEDURE DATE:  2021          INTERPRETATION:  CLINICAL STATEMENT: Chest Pain.    TECHNIQUE: AP view of the chest.    COMPARISON: None available.    FINDINGS/  IMPRESSION:  No consolidation or pleural effusion    Heart size cannot be accurately assessed in this projection.              YASMANI FERNANDEZ MD; Attending Radiologist  This document has been electronically signed. Mar  4 2021  4:46PM    < end of copied text >      Imaging Personally Reviewed:  [x ] YES  [ ] NO

## 2021-03-08 DIAGNOSIS — J45.909 UNSPECIFIED ASTHMA, UNCOMPLICATED: ICD-10-CM

## 2021-03-08 DIAGNOSIS — N39.0 URINARY TRACT INFECTION, SITE NOT SPECIFIED: ICD-10-CM

## 2021-03-08 DIAGNOSIS — R78.81 BACTEREMIA: ICD-10-CM

## 2021-03-08 LAB
ANION GAP SERPL CALC-SCNC: 10 MMOL/L — SIGNIFICANT CHANGE UP (ref 5–17)
ANION GAP SERPL CALC-SCNC: 7 MMOL/L — SIGNIFICANT CHANGE UP (ref 5–17)
BUN SERPL-MCNC: 10 MG/DL — SIGNIFICANT CHANGE UP (ref 7–18)
BUN SERPL-MCNC: 11 MG/DL — SIGNIFICANT CHANGE UP (ref 7–18)
CALCIUM SERPL-MCNC: 7.5 MG/DL — LOW (ref 8.4–10.5)
CALCIUM SERPL-MCNC: 8.1 MG/DL — LOW (ref 8.4–10.5)
CHLORIDE SERPL-SCNC: 105 MMOL/L — SIGNIFICANT CHANGE UP (ref 96–108)
CHLORIDE SERPL-SCNC: 107 MMOL/L — SIGNIFICANT CHANGE UP (ref 96–108)
CO2 SERPL-SCNC: 26 MMOL/L — SIGNIFICANT CHANGE UP (ref 22–31)
CO2 SERPL-SCNC: 27 MMOL/L — SIGNIFICANT CHANGE UP (ref 22–31)
CREAT SERPL-MCNC: 0.8 MG/DL — SIGNIFICANT CHANGE UP (ref 0.5–1.3)
FERRITIN SERPL-MCNC: 118 NG/ML — SIGNIFICANT CHANGE UP (ref 15–150)
FOLATE SERPL-MCNC: 15 NG/ML — SIGNIFICANT CHANGE UP
GLUCOSE BLDC GLUCOMTR-MCNC: 165 MG/DL — HIGH (ref 70–99)
GLUCOSE BLDC GLUCOMTR-MCNC: 184 MG/DL — HIGH (ref 70–99)
GLUCOSE BLDC GLUCOMTR-MCNC: 185 MG/DL — HIGH (ref 70–99)
GLUCOSE BLDC GLUCOMTR-MCNC: 186 MG/DL — HIGH (ref 70–99)
GLUCOSE SERPL-MCNC: 139 MG/DL — HIGH (ref 70–99)
GLUCOSE SERPL-MCNC: 204 MG/DL — HIGH (ref 70–99)
HCT VFR BLD CALC: 33.2 % — LOW (ref 34.5–45)
HGB BLD-MCNC: 10.9 G/DL — LOW (ref 11.5–15.5)
IRON SATN MFR SERPL: 16 UG/DL — LOW (ref 40–150)
IRON SATN MFR SERPL: 6 % — LOW (ref 15–50)
MAGNESIUM SERPL-MCNC: 1.8 MG/DL — SIGNIFICANT CHANGE UP (ref 1.6–2.6)
MCHC RBC-ENTMCNC: 26.8 PG — LOW (ref 27–34)
MCHC RBC-ENTMCNC: 32.8 GM/DL — SIGNIFICANT CHANGE UP (ref 32–36)
MCV RBC AUTO: 81.6 FL — SIGNIFICANT CHANGE UP (ref 80–100)
NRBC # BLD: 0 /100 WBCS — SIGNIFICANT CHANGE UP (ref 0–0)
PHOSPHATE SERPL-MCNC: 1.5 MG/DL — LOW (ref 2.5–4.5)
PLATELET # BLD AUTO: 148 K/UL — LOW (ref 150–400)
POTASSIUM SERPL-MCNC: 2.9 MMOL/L — CRITICAL LOW (ref 3.5–5.3)
POTASSIUM SERPL-MCNC: 4.9 MMOL/L — SIGNIFICANT CHANGE UP (ref 3.5–5.3)
POTASSIUM SERPL-SCNC: 2.9 MMOL/L — CRITICAL LOW (ref 3.5–5.3)
POTASSIUM SERPL-SCNC: 4.9 MMOL/L — SIGNIFICANT CHANGE UP (ref 3.5–5.3)
RBC # BLD: 4.07 M/UL — SIGNIFICANT CHANGE UP (ref 3.8–5.2)
RBC # FLD: 13.9 % — SIGNIFICANT CHANGE UP (ref 10.3–14.5)
SODIUM SERPL-SCNC: 141 MMOL/L — SIGNIFICANT CHANGE UP (ref 135–145)
SODIUM SERPL-SCNC: 141 MMOL/L — SIGNIFICANT CHANGE UP (ref 135–145)
TIBC SERPL-MCNC: 273 UG/DL — SIGNIFICANT CHANGE UP (ref 250–450)
UIBC SERPL-MCNC: 257 UG/DL — SIGNIFICANT CHANGE UP (ref 110–370)
VIT B12 SERPL-MCNC: 1121 PG/ML — SIGNIFICANT CHANGE UP (ref 232–1245)
WBC # BLD: 7.61 K/UL — SIGNIFICANT CHANGE UP (ref 3.8–10.5)
WBC # FLD AUTO: 7.61 K/UL — SIGNIFICANT CHANGE UP (ref 3.8–10.5)

## 2021-03-08 PROCEDURE — 99233 SBSQ HOSP IP/OBS HIGH 50: CPT | Mod: GC

## 2021-03-08 PROCEDURE — 99232 SBSQ HOSP IP/OBS MODERATE 35: CPT

## 2021-03-08 RX ORDER — POTASSIUM CHLORIDE 20 MEQ
40 PACKET (EA) ORAL EVERY 4 HOURS
Refills: 0 | Status: COMPLETED | OUTPATIENT
Start: 2021-03-08 | End: 2021-03-08

## 2021-03-08 RX ORDER — MONTELUKAST 4 MG/1
10 TABLET, CHEWABLE ORAL DAILY
Refills: 0 | Status: DISCONTINUED | OUTPATIENT
Start: 2021-03-08 | End: 2021-03-09

## 2021-03-08 RX ORDER — LOSARTAN POTASSIUM 100 MG/1
50 TABLET, FILM COATED ORAL DAILY
Refills: 0 | Status: DISCONTINUED | OUTPATIENT
Start: 2021-03-08 | End: 2021-03-09

## 2021-03-08 RX ORDER — LEVOTHYROXINE SODIUM 125 MCG
100 TABLET ORAL DAILY
Refills: 0 | Status: DISCONTINUED | OUTPATIENT
Start: 2021-03-09 | End: 2021-03-09

## 2021-03-08 RX ORDER — ALBUTEROL 90 UG/1
2 AEROSOL, METERED ORAL EVERY 6 HOURS
Refills: 0 | Status: DISCONTINUED | OUTPATIENT
Start: 2021-03-08 | End: 2021-03-09

## 2021-03-08 RX ORDER — FERROUS SULFATE 325(65) MG
325 TABLET ORAL DAILY
Refills: 0 | Status: DISCONTINUED | OUTPATIENT
Start: 2021-03-08 | End: 2021-03-09

## 2021-03-08 RX ADMIN — Medication 1: at 17:24

## 2021-03-08 RX ADMIN — Medication 85 MILLIMOLE(S): at 12:39

## 2021-03-08 RX ADMIN — HEPARIN SODIUM 5000 UNIT(S): 5000 INJECTION INTRAVENOUS; SUBCUTANEOUS at 22:01

## 2021-03-08 RX ADMIN — HEPARIN SODIUM 5000 UNIT(S): 5000 INJECTION INTRAVENOUS; SUBCUTANEOUS at 05:21

## 2021-03-08 RX ADMIN — HEPARIN SODIUM 5000 UNIT(S): 5000 INJECTION INTRAVENOUS; SUBCUTANEOUS at 14:13

## 2021-03-08 RX ADMIN — Medication 40 MILLIEQUIVALENT(S): at 05:21

## 2021-03-08 RX ADMIN — Medication 1: at 08:23

## 2021-03-08 RX ADMIN — ALBUTEROL 2 PUFF(S): 90 AEROSOL, METERED ORAL at 22:09

## 2021-03-08 RX ADMIN — Medication 75 MICROGRAM(S): at 05:21

## 2021-03-08 RX ADMIN — Medication 40 MILLIEQUIVALENT(S): at 00:27

## 2021-03-08 RX ADMIN — CEFTRIAXONE 100 MILLIGRAM(S): 500 INJECTION, POWDER, FOR SOLUTION INTRAMUSCULAR; INTRAVENOUS at 12:39

## 2021-03-08 RX ADMIN — INSULIN GLARGINE 5 UNIT(S): 100 INJECTION, SOLUTION SUBCUTANEOUS at 22:02

## 2021-03-08 NOTE — PROVIDER CONTACT NOTE (CRITICAL VALUE NOTIFICATION) - RECOMMENDATIONS
Will administer Klor-con Powder 40mEq X 2 doses as ordered by Dr. Katz. As per Dr. Katz to follow up with routine AM labs. Will endorse to AM nurse of situation.
Potassium supplement
To contact 3rd year resident Dr. MALIKA Angulo

## 2021-03-08 NOTE — PROGRESS NOTE ADULT - REASON FOR ADMISSION
sepsis, ureterolithiasis

## 2021-03-08 NOTE — PROVIDER CONTACT NOTE (CRITICAL VALUE NOTIFICATION) - BACKGROUND
Patient has sepsis code, was given bolus and Zozyn at 1:00am.
Pt on regular diet, no nausea, vomitting diarhea
Pt. is S/PS Cytoscopy/ Left Ureter stent placement with segura insertion on 3/5/21

## 2021-03-08 NOTE — PROGRESS NOTE ADULT - PROBLEM SELECTOR PLAN 9
IMPROVE VTE Individual Risk Assessment   RISK                                                          Points  [  ] Previous VTE                                                3  [  ] Thrombophilia                                             2  [  ] Lower limb paralysis                                    2        (unable to hold up >15 seconds)    [  ] Current Cancer                                             2         (within 6 months)  [  x] Immobilization > 24 hrs                              1  [  ] ICU/CCU stay > 24 hours                            1  [x  ] Age > 60                                                    1  IMPROVE VTE Score _________2  heparin sq

## 2021-03-08 NOTE — PROGRESS NOTE ADULT - ASSESSMENT
76 y/o female here with prox L ureteral stone, hydro, urosepsis, ^creat s/p cysto/stent 3/5'  wbc nl  Hypokalemia  Hypophosphatemia    - continue zosyn  - urine cx, blood cx pending  - F/u ToV  - F/u blood sugar, continue lantus  - replete K, Ph  - Recommend to f/u with Dr. Benton as outpatient for evaluation and stent removal

## 2021-03-08 NOTE — PROGRESS NOTE ADULT - SUBJECTIVE AND OBJECTIVE BOX
Interval Events:    No acute events overnight    O: Vital Signs Last 24 Hrs  T(C): 36.9 (08 Mar 2021 14:40), Max: 37.6 (07 Mar 2021 21:49)  T(F): 98.5 (08 Mar 2021 14:40), Max: 99.6 (07 Mar 2021 21:49)  HR: 82 (08 Mar 2021 14:40) (81 - 89)  BP: 147/76 (08 Mar 2021 14:40) (147/76 - 161/67)  BP(mean): --  RR: 17 (08 Mar 2021 14:40) (16 - 17)  SpO2: 100% (08 Mar 2021 14:40) (95% - 100%)      07 Mar 2021 07:01  -  08 Mar 2021 07:00  --------------------------------------------------------  IN:  Total IN: 0 mL    OUT:    Indwelling Catheter - Urethral (mL): 3450 mL  Total OUT: 3450 mL    Total NET: -3450 mL      08 Mar 2021 07:01  -  08 Mar 2021 15:07  --------------------------------------------------------  IN:  Total IN: 0 mL    OUT:    Indwelling Catheter - Urethral (mL): 1100 mL  Total OUT: 1100 mL    Total NET: -1100 mL          Physical Exam:    Gen: In no acute distress  Resp: No additional work of breathing   GI: Soft, non-tender, non-distended, with normoactive bowel sounds.  No masses.  : Juares draining yellow urine  MSK: Moves all extremities equally  Skin: No rashes    Labs:                        10.9   7.61  )-----------( 148      ( 08 Mar 2021 06:40 )             33.2     08 Mar 2021 06:40    141    |  107    |  10     ----------------------------<  204    4.9     |  27     |  0.80     Ca    8.1        08 Mar 2021 06:40  Phos  1.5       08 Mar 2021 06:40  Mg     1.8       08 Mar 2021 06:40        CAPILLARY BLOOD GLUCOSE      POCT Blood Glucose.: 165 mg/dL (08 Mar 2021 11:10)  POCT Blood Glucose.: 184 mg/dL (08 Mar 2021 07:50)  POCT Blood Glucose.: 161 mg/dL (07 Mar 2021 21:37)  POCT Blood Glucose.: 149 mg/dL (07 Mar 2021 16:30)            Culture - Blood (collected 06 Mar 2021 22:00)  Source: .Blood Blood-Venous  Preliminary Report (07 Mar 2021 22:01):    No growth to date.          MEDICATIONS  (STANDING):  cefTRIAXone   IVPB      cefTRIAXone   IVPB 2000 milliGRAM(s) IV Intermittent every 24 hours  dextrose 40% Gel 15 Gram(s) Oral once  dextrose 5%. 1000 milliLiter(s) (100 mL/Hr) IV Continuous <Continuous>  dextrose 5%. 1000 milliLiter(s) (50 mL/Hr) IV Continuous <Continuous>  dextrose 50% Injectable 25 Gram(s) IV Push once  dextrose 50% Injectable 12.5 Gram(s) IV Push once  dextrose 50% Injectable 25 Gram(s) IV Push once  glucagon  Injectable 1 milliGRAM(s) IntraMuscular once  heparin   Injectable 5000 Unit(s) SubCutaneous every 8 hours  insulin glargine Injectable (LANTUS) 5 Unit(s) SubCutaneous at bedtime  insulin lispro (ADMELOG) corrective regimen sliding scale   SubCutaneous three times a day before meals  insulin lispro (ADMELOG) corrective regimen sliding scale   SubCutaneous at bedtime  lactated ringers. 1000 milliLiter(s) (75 mL/Hr) IV Continuous <Continuous>  levothyroxine Injectable 75 MICROGram(s) IV Push <User Schedule>    MEDICATIONS  (PRN):  acetaminophen   Tablet .. 650 milliGRAM(s) Oral every 6 hours PRN Temp greater or equal to 38C (100.4F), Mild Pain (1 - 3)  ondansetron Injectable 4 milliGRAM(s) IV Push every 6 hours PRN Nausea and/or Vomiting

## 2021-03-08 NOTE — PROGRESS NOTE ADULT - PROBLEM SELECTOR PLAN 6
- on Glimepiride 2mg, Aligliptin 25 and Metformin 500 bid  - continue lantus 5 and sliding scale  - resume home meds at discharge

## 2021-03-08 NOTE — PROVIDER CONTACT NOTE (CRITICAL VALUE NOTIFICATION) - SITUATION
Pt. had a critical potassium of 2.5 during AM routine Labs and Pt. was given 3 potassium riders during AM shift.
K 2.5
Patient transferred from PACU, after Cysto Stent placement.
55

## 2021-03-08 NOTE — PROGRESS NOTE ADULT - PROBLEM SELECTOR PLAN 2
- Pt with E.coli uti and stones  - s/p cysto and ureteric stent placement in left ureter on 3/5.  - continue ceftriaxone

## 2021-03-08 NOTE — PROGRESS NOTE ADULT - SUBJECTIVE AND OBJECTIVE BOX
PGY-1 Progress Note discussed with attending    PAGER #: [[phone]] TILL 5:00 PM  PLEASE CONTACT ON CALL TEAM:  - On Call Team (Please refer to Sarah) FROM 5:00 PM - 8:30PM  - Nightfloat Team FROM 8:30 -7:30 AM    CHIEF COMPLAINT & BRIEF HOSPITAL COURSE:      INTERVAL HPI/OVERNIGHT EVENTS:       REVIEW OF SYSTEMS:  CONSTITUTIONAL: No fever, weight loss, or fatigue  RESPIRATORY: No cough, wheezing, chills or hemoptysis; No shortness of breath  CARDIOVASCULAR: No chest pain, palpitations, dizziness, or leg swelling  GASTROINTESTINAL: No abdominal pain. No nausea, vomiting, or hematemesis; No diarrhea or constipation. No melena or hematochezia.  GENITOURINARY: No dysuria or hematuria, urinary frequency  MUSCULOSKELETAL: No pain, no Limited ROM  NEUROLOGICAL: No headaches, memory loss, loss of strength, numbness, or tremors  SKIN: No itching, burning, rashes, or lesions     MEDICATIONS  (STANDING):  cefTRIAXone   IVPB      cefTRIAXone   IVPB 2000 milliGRAM(s) IV Intermittent every 24 hours  dextrose 40% Gel 15 Gram(s) Oral once  dextrose 5%. 1000 milliLiter(s) (100 mL/Hr) IV Continuous <Continuous>  dextrose 5%. 1000 milliLiter(s) (50 mL/Hr) IV Continuous <Continuous>  dextrose 50% Injectable 25 Gram(s) IV Push once  dextrose 50% Injectable 12.5 Gram(s) IV Push once  dextrose 50% Injectable 25 Gram(s) IV Push once  glucagon  Injectable 1 milliGRAM(s) IntraMuscular once  heparin   Injectable 5000 Unit(s) SubCutaneous every 8 hours  insulin glargine Injectable (LANTUS) 5 Unit(s) SubCutaneous at bedtime  insulin lispro (ADMELOG) corrective regimen sliding scale   SubCutaneous three times a day before meals  insulin lispro (ADMELOG) corrective regimen sliding scale   SubCutaneous at bedtime  lactated ringers. 1000 milliLiter(s) (75 mL/Hr) IV Continuous <Continuous>  levothyroxine Injectable 75 MICROGram(s) IV Push <User Schedule>  sodium phosphate IVPB 30 milliMole(s) IV Intermittent once    MEDICATIONS  (PRN):  acetaminophen   Tablet .. 650 milliGRAM(s) Oral every 6 hours PRN Temp greater or equal to 38C (100.4F), Mild Pain (1 - 3)  ondansetron Injectable 4 milliGRAM(s) IV Push every 6 hours PRN Nausea and/or Vomiting      Vital Signs Last 24 Hrs  T(C): 36.3 (08 Mar 2021 05:11), Max: 37.6 (07 Mar 2021 21:49)  T(F): 97.4 (08 Mar 2021 05:11), Max: 99.6 (07 Mar 2021 21:49)  HR: 89 (08 Mar 2021 05:11) (81 - 89)  BP: 161/67 (08 Mar 2021 05:11) (145/79 - 161/67)  BP(mean): --  RR: 16 (08 Mar 2021 05:11) (16 - 18)  SpO2: 95% (08 Mar 2021 05:11) (95% - 99%)    PHYSICAL EXAMINATION:  GENERAL: NAD, well built  HEAD:  Atraumatic, Normocephalic  EYES:  conjunctiva and sclera clear, no scleral icterus  NECK: Supple, No JVD, Normal thyroid  CHEST/LUNG: Clear to auscultation.  No rales, rhonchi, wheezing, or rubs  HEART: Regular rate and rhythm; No murmurs, rubs, or gallops  ABDOMEN: Soft, Nontender, Nondistended; Bowel sounds present  NERVOUS SYSTEM:  Alert & Oriented X3,  Strength 5/5 in upper and lower extremities, sensation intact  EXTREMITIES:  2+ Peripheral Pulses, No clubbing, cyanosis, or edema  SKIN: warm dry, no lesions noted                          10.9   7.61  )-----------( 148      ( 08 Mar 2021 06:40 )             33.2     03-08    141  |  107  |  10  ----------------------------<  204<H>  4.9   |  27  |  0.80    Ca    8.1<L>      08 Mar 2021 06:40  Phos  1.5     03-08  Mg     1.8     03-08              I&O's Summary    07 Mar 2021 07:01  -  08 Mar 2021 07:00  --------------------------------------------------------  IN: 0 mL / OUT: 3450 mL / NET: -3450 mL        Culture - Blood (collected 06 Mar 2021 22:00)  Source: .Blood Blood-Venous  Preliminary Report (07 Mar 2021 22:01):    No growth to date.        RADIOLOGY & ADDITIONAL TESTS:                   PGY-1 Progress Note discussed with attending    PAGER #: [[phone]] TILL 5:00 PM  PLEASE CONTACT ON CALL TEAM:  - On Call Team (Please refer to Sarah) FROM 5:00 PM - 8:30PM  - Nightfloat Team FROM 8:30 -7:30 AM    CHIEF COMPLAINT & BRIEF HOSPITAL COURSE:  Patient is a 74 y/o female with h/o HTN, DM, HLD, hypothyroid, PSH-cholecystectomy, hip sx, who present complaining of several days abd pain brought by family for lethargy today, difficulty ambulating, fever She was admitted to surgery with sepsis of sepsis, ureterolithiasis, left pyelonephritis and hydroureteronephrosis. She is S/p cysto and ureteric stent placement in left ureter on 3/5. Medicine consult was called for post-operative management. Patient was agitated, delirious, requiring few doses of Haldol. Started on Seroquel. Blood culture is positive for E.Coli pansensitive. Patient is back to baseline.    INTERVAL HPI/OVERNIGHT EVENTS:   Patient seen and examined at bedside, she denies any complains. She didn't eat breakfast because "the food is not good". She is not in distress, has segura in place draining clear urine.     REVIEW OF SYSTEMS:  CONSTITUTIONAL: No fever, weight loss, or fatigue  RESPIRATORY: No cough, wheezing, chills or hemoptysis; No shortness of breath  CARDIOVASCULAR: No chest pain, palpitations, dizziness, or leg swelling  GASTROINTESTINAL: No abdominal pain. No nausea, vomiting, or hematemesis; No diarrhea or constipation. No melena or hematochezia.  GENITOURINARY: No dysuria or hematuria, urinary frequency  MUSCULOSKELETAL: No pain, no Limited ROM  NEUROLOGICAL: No headaches, memory loss, loss of strength, numbness, or tremors  SKIN: No itching, burning, rashes, or lesions     MEDICATIONS  (STANDING):  cefTRIAXone   IVPB      cefTRIAXone   IVPB 2000 milliGRAM(s) IV Intermittent every 24 hours  dextrose 40% Gel 15 Gram(s) Oral once  dextrose 5%. 1000 milliLiter(s) (100 mL/Hr) IV Continuous <Continuous>  dextrose 5%. 1000 milliLiter(s) (50 mL/Hr) IV Continuous <Continuous>  dextrose 50% Injectable 25 Gram(s) IV Push once  dextrose 50% Injectable 12.5 Gram(s) IV Push once  dextrose 50% Injectable 25 Gram(s) IV Push once  glucagon  Injectable 1 milliGRAM(s) IntraMuscular once  heparin   Injectable 5000 Unit(s) SubCutaneous every 8 hours  insulin glargine Injectable (LANTUS) 5 Unit(s) SubCutaneous at bedtime  insulin lispro (ADMELOG) corrective regimen sliding scale   SubCutaneous three times a day before meals  insulin lispro (ADMELOG) corrective regimen sliding scale   SubCutaneous at bedtime  lactated ringers. 1000 milliLiter(s) (75 mL/Hr) IV Continuous <Continuous>  levothyroxine Injectable 75 MICROGram(s) IV Push <User Schedule>  sodium phosphate IVPB 30 milliMole(s) IV Intermittent once    MEDICATIONS  (PRN):  acetaminophen   Tablet .. 650 milliGRAM(s) Oral every 6 hours PRN Temp greater or equal to 38C (100.4F), Mild Pain (1 - 3)  ondansetron Injectable 4 milliGRAM(s) IV Push every 6 hours PRN Nausea and/or Vomiting      Vital Signs Last 24 Hrs  T(C): 36.3 (08 Mar 2021 05:11), Max: 37.6 (07 Mar 2021 21:49)  T(F): 97.4 (08 Mar 2021 05:11), Max: 99.6 (07 Mar 2021 21:49)  HR: 89 (08 Mar 2021 05:11) (81 - 89)  BP: 161/67 (08 Mar 2021 05:11) (145/79 - 161/67)  BP(mean): --  RR: 16 (08 Mar 2021 05:11) (16 - 18)  SpO2: 95% (08 Mar 2021 05:11) (95% - 99%)    PHYSICAL EXAMINATION:  GENERAL: NAD, small old lady  HEAD:  Atraumatic, Normocephalic,   EYES:  conjunctiva and sclera clear, no scleral icterus  NECK: Supple, No JVD, Normal thyroid  CHEST/LUNG: Clear to auscultation.  No rales, rhonchi, wheezing, or rubs  HEART: Regular rate and rhythm; No murmurs, rubs, or gallops  ABDOMEN: Soft, Nontender, Nondistended; Bowel sounds present  : Segura in place, draining clear yellow urine. No blood seen.   NERVOUS SYSTEM:  Alert & Oriented X3,  Strength 5/5 in upper and lower extremities, sensation intact  EXTREMITIES:  2+ Peripheral Pulses, No clubbing, cyanosis, or edema  SKIN: warm dry, no lesions noted                          10.9   7.61  )-----------( 148      ( 08 Mar 2021 06:40 )             33.2     03-08    141  |  107  |  10  ----------------------------<  204<H>  4.9   |  27  |  0.80    Ca    8.1<L>      08 Mar 2021 06:40  Phos  1.5     03-08  Mg     1.8     03-08              I&O's Summary    07 Mar 2021 07:01  -  08 Mar 2021 07:00  --------------------------------------------------------  IN: 0 mL / OUT: 3450 mL / NET: -3450 mL        Culture - Blood (collected 06 Mar 2021 22:00)  Source: .Blood Blood-Venous  Preliminary Report (07 Mar 2021 22:01):    No growth to date.        RADIOLOGY & ADDITIONAL TESTS:

## 2021-03-08 NOTE — PROVIDER CONTACT NOTE (CRITICAL VALUE NOTIFICATION) - ASSESSMENT
Pt. denies any N/V or diarrhea.
no s/s on hypokalemia noted
Patient is stable on continuous tele monitoring

## 2021-03-08 NOTE — PROGRESS NOTE ADULT - ATTENDING COMMENTS
Pt seen and examined. Agree with note as written above. Pt much more alert and oriented today    - Plan as above  - FU as outpt for definitive stone management
Pt seen and examined. Agree with note as written above. Febrile about an hour ago and currently alert but not oriented. No complaints of pain    - Plan as above  - Discussed plan with daughter
Pt seen and examined. Agree with note as written above. Pt less agitated today    - Plan as above  - Appreciate medicine assistance  - FU as outpt for definitive stone management
Pt seen and examined. Agree with note as written above. Pt more agitated today    - Plan as above  - Appreciate medicine recs
Patient was seen and examined at bedside   Sitting on chair, denies any complains     Vitals stable     P/E:  NAD  AAOx3, no new focal deficit   Lungs CTABL  S1S2 WNL, no mrg  Abd soft non tender, BS present   BL LE no calf tenderness or edema     Labs noted     A/P:  E. coli Bacteremia   Acute encephalopathy- resolved; likely secondary to sepsis, hospital acquired delirium   Left pyelonephritis   Mild Left hydroureteronephrosis   Left obstructive ureteral calculus s/p stent 3/5  RICKEY resolved  Normocytic anemia  Hypothyroidism  HTN/ HLD  T2DM    Recommendations  Cont Ceftriaxone 2gm qd until tomorrow, can be changed to PO Ceftin 500mg BID to complete total 10days  Neg repeat blood culture 3/6  monitor cr  Restarted home dose Losartan  Maintain BS >140-180  Frequent reorientation, enhanced supervision  ADAM Juares  PT eval  Supplement iron, full anemia work up as outpatient   Outpatient follow up with Urology for stent removal  Patient wanted patient's daughter to be updated, called twice, could not leave  as mailbox was full

## 2021-03-08 NOTE — PROGRESS NOTE ADULT - PROBLEM SELECTOR PLAN 1
- Pt with E.coli bacteremia  - s/p cysto and ureteric stent placement in left ureter on 3/5.  - Continue ceftriaxone 2g q24  - F/u repeat blood cultures

## 2021-03-09 ENCOUNTER — TRANSCRIPTION ENCOUNTER (OUTPATIENT)
Age: 76
End: 2021-03-09

## 2021-03-09 VITALS
SYSTOLIC BLOOD PRESSURE: 140 MMHG | OXYGEN SATURATION: 97 % | RESPIRATION RATE: 18 BRPM | HEART RATE: 76 BPM | TEMPERATURE: 98 F | DIASTOLIC BLOOD PRESSURE: 57 MMHG

## 2021-03-09 LAB
ANION GAP SERPL CALC-SCNC: 9 MMOL/L — SIGNIFICANT CHANGE UP (ref 5–17)
BUN SERPL-MCNC: 9 MG/DL — SIGNIFICANT CHANGE UP (ref 7–18)
CALCIUM SERPL-MCNC: 8.1 MG/DL — LOW (ref 8.4–10.5)
CHLORIDE SERPL-SCNC: 104 MMOL/L — SIGNIFICANT CHANGE UP (ref 96–108)
CO2 SERPL-SCNC: 27 MMOL/L — SIGNIFICANT CHANGE UP (ref 22–31)
CREAT SERPL-MCNC: 0.75 MG/DL — SIGNIFICANT CHANGE UP (ref 0.5–1.3)
GLUCOSE BLDC GLUCOMTR-MCNC: 133 MG/DL — HIGH (ref 70–99)
GLUCOSE BLDC GLUCOMTR-MCNC: 137 MG/DL — HIGH (ref 70–99)
GLUCOSE BLDC GLUCOMTR-MCNC: 144 MG/DL — HIGH (ref 70–99)
GLUCOSE BLDC GLUCOMTR-MCNC: 164 MG/DL — HIGH (ref 70–99)
GLUCOSE BLDC GLUCOMTR-MCNC: 172 MG/DL — HIGH (ref 70–99)
GLUCOSE BLDC GLUCOMTR-MCNC: 199 MG/DL — HIGH (ref 70–99)
GLUCOSE BLDC GLUCOMTR-MCNC: 241 MG/DL — HIGH (ref 70–99)
GLUCOSE SERPL-MCNC: 159 MG/DL — HIGH (ref 70–99)
HCT VFR BLD CALC: 33.7 % — LOW (ref 34.5–45)
HGB BLD-MCNC: 10.9 G/DL — LOW (ref 11.5–15.5)
MAGNESIUM SERPL-MCNC: 1.8 MG/DL — SIGNIFICANT CHANGE UP (ref 1.6–2.6)
MCHC RBC-ENTMCNC: 26.5 PG — LOW (ref 27–34)
MCHC RBC-ENTMCNC: 32.3 GM/DL — SIGNIFICANT CHANGE UP (ref 32–36)
MCV RBC AUTO: 82 FL — SIGNIFICANT CHANGE UP (ref 80–100)
NRBC # BLD: 0 /100 WBCS — SIGNIFICANT CHANGE UP (ref 0–0)
PHOSPHATE SERPL-MCNC: 2.4 MG/DL — LOW (ref 2.5–4.5)
PLATELET # BLD AUTO: 171 K/UL — SIGNIFICANT CHANGE UP (ref 150–400)
POTASSIUM SERPL-MCNC: 3.6 MMOL/L — SIGNIFICANT CHANGE UP (ref 3.5–5.3)
POTASSIUM SERPL-SCNC: 3.6 MMOL/L — SIGNIFICANT CHANGE UP (ref 3.5–5.3)
RBC # BLD: 4.11 M/UL — SIGNIFICANT CHANGE UP (ref 3.8–5.2)
RBC # FLD: 14.1 % — SIGNIFICANT CHANGE UP (ref 10.3–14.5)
SODIUM SERPL-SCNC: 140 MMOL/L — SIGNIFICANT CHANGE UP (ref 135–145)
WBC # BLD: 10.15 K/UL — SIGNIFICANT CHANGE UP (ref 3.8–10.5)
WBC # FLD AUTO: 10.15 K/UL — SIGNIFICANT CHANGE UP (ref 3.8–10.5)

## 2021-03-09 PROCEDURE — 87635 SARS-COV-2 COVID-19 AMP PRB: CPT

## 2021-03-09 PROCEDURE — 96361 HYDRATE IV INFUSION ADD-ON: CPT

## 2021-03-09 PROCEDURE — 83605 ASSAY OF LACTIC ACID: CPT

## 2021-03-09 PROCEDURE — 84484 ASSAY OF TROPONIN QUANT: CPT

## 2021-03-09 PROCEDURE — 87086 URINE CULTURE/COLONY COUNT: CPT

## 2021-03-09 PROCEDURE — 85025 COMPLETE CBC W/AUTO DIFF WBC: CPT

## 2021-03-09 PROCEDURE — 82607 VITAMIN B-12: CPT

## 2021-03-09 PROCEDURE — 99239 HOSP IP/OBS DSCHRG MGMT >30: CPT | Mod: GC

## 2021-03-09 PROCEDURE — 87186 SC STD MICRODIL/AGAR DIL: CPT

## 2021-03-09 PROCEDURE — U0005: CPT

## 2021-03-09 PROCEDURE — 36415 COLL VENOUS BLD VENIPUNCTURE: CPT

## 2021-03-09 PROCEDURE — 83735 ASSAY OF MAGNESIUM: CPT

## 2021-03-09 PROCEDURE — 80048 BASIC METABOLIC PNL TOTAL CA: CPT

## 2021-03-09 PROCEDURE — 85610 PROTHROMBIN TIME: CPT

## 2021-03-09 PROCEDURE — 85027 COMPLETE CBC AUTOMATED: CPT

## 2021-03-09 PROCEDURE — 87077 CULTURE AEROBIC IDENTIFY: CPT

## 2021-03-09 PROCEDURE — 83550 IRON BINDING TEST: CPT

## 2021-03-09 PROCEDURE — 94640 AIRWAY INHALATION TREATMENT: CPT

## 2021-03-09 PROCEDURE — 82962 GLUCOSE BLOOD TEST: CPT

## 2021-03-09 PROCEDURE — 99232 SBSQ HOSP IP/OBS MODERATE 35: CPT

## 2021-03-09 PROCEDURE — 87150 DNA/RNA AMPLIFIED PROBE: CPT

## 2021-03-09 PROCEDURE — C2617: CPT

## 2021-03-09 PROCEDURE — 87040 BLOOD CULTURE FOR BACTERIA: CPT

## 2021-03-09 PROCEDURE — 76000 FLUOROSCOPY <1 HR PHYS/QHP: CPT

## 2021-03-09 PROCEDURE — 85730 THROMBOPLASTIN TIME PARTIAL: CPT

## 2021-03-09 PROCEDURE — 83036 HEMOGLOBIN GLYCOSYLATED A1C: CPT

## 2021-03-09 PROCEDURE — 81001 URINALYSIS AUTO W/SCOPE: CPT

## 2021-03-09 PROCEDURE — 84100 ASSAY OF PHOSPHORUS: CPT

## 2021-03-09 PROCEDURE — 96374 THER/PROPH/DIAG INJ IV PUSH: CPT

## 2021-03-09 PROCEDURE — 74177 CT ABD & PELVIS W/CONTRAST: CPT

## 2021-03-09 PROCEDURE — 71045 X-RAY EXAM CHEST 1 VIEW: CPT

## 2021-03-09 PROCEDURE — 82009 KETONE BODYS QUAL: CPT

## 2021-03-09 PROCEDURE — 70450 CT HEAD/BRAIN W/O DYE: CPT

## 2021-03-09 PROCEDURE — G0480: CPT

## 2021-03-09 PROCEDURE — 82746 ASSAY OF FOLIC ACID SERUM: CPT

## 2021-03-09 PROCEDURE — 93005 ELECTROCARDIOGRAM TRACING: CPT

## 2021-03-09 PROCEDURE — 99285 EMERGENCY DEPT VISIT HI MDM: CPT | Mod: 25

## 2021-03-09 PROCEDURE — C1758: CPT

## 2021-03-09 PROCEDURE — 80053 COMPREHEN METABOLIC PANEL: CPT

## 2021-03-09 PROCEDURE — 83540 ASSAY OF IRON: CPT

## 2021-03-09 PROCEDURE — 82728 ASSAY OF FERRITIN: CPT

## 2021-03-09 RX ORDER — FERROUS SULFATE 325(65) MG
1 TABLET ORAL
Qty: 30 | Refills: 0
Start: 2021-03-09 | End: 2021-04-07

## 2021-03-09 RX ORDER — SENNA PLUS 8.6 MG/1
1 TABLET ORAL
Qty: 30 | Refills: 0
Start: 2021-03-09 | End: 2021-04-07

## 2021-03-09 RX ORDER — CEFUROXIME AXETIL 250 MG
1 TABLET ORAL
Qty: 18 | Refills: 0
Start: 2021-03-09 | End: 2021-03-17

## 2021-03-09 RX ORDER — DOCUSATE SODIUM 100 MG
1 CAPSULE ORAL
Qty: 0 | Refills: 0 | DISCHARGE

## 2021-03-09 RX ADMIN — LOSARTAN POTASSIUM 50 MILLIGRAM(S): 100 TABLET, FILM COATED ORAL at 05:53

## 2021-03-09 RX ADMIN — Medication 650 MILLIGRAM(S): at 06:01

## 2021-03-09 RX ADMIN — Medication 650 MILLIGRAM(S): at 16:50

## 2021-03-09 RX ADMIN — CEFTRIAXONE 100 MILLIGRAM(S): 500 INJECTION, POWDER, FOR SOLUTION INTRAMUSCULAR; INTRAVENOUS at 11:43

## 2021-03-09 RX ADMIN — Medication 650 MILLIGRAM(S): at 15:40

## 2021-03-09 RX ADMIN — Medication 100 MICROGRAM(S): at 05:53

## 2021-03-09 RX ADMIN — HEPARIN SODIUM 5000 UNIT(S): 5000 INJECTION INTRAVENOUS; SUBCUTANEOUS at 05:53

## 2021-03-09 RX ADMIN — Medication 650 MILLIGRAM(S): at 06:46

## 2021-03-09 RX ADMIN — Medication 2: at 08:23

## 2021-03-09 RX ADMIN — Medication 1: at 11:43

## 2021-03-09 RX ADMIN — MONTELUKAST 10 MILLIGRAM(S): 4 TABLET, CHEWABLE ORAL at 11:44

## 2021-03-09 RX ADMIN — HEPARIN SODIUM 5000 UNIT(S): 5000 INJECTION INTRAVENOUS; SUBCUTANEOUS at 15:00

## 2021-03-09 RX ADMIN — Medication 325 MILLIGRAM(S): at 11:44

## 2021-03-09 NOTE — DISCHARGE NOTE PROVIDER - NSDCCPCAREPLAN_GEN_ALL_CORE_FT
PRINCIPAL DISCHARGE DIAGNOSIS  Diagnosis: Sepsis, due to unspecified organism, unspecified whether acute organ dysfunction present  Assessment and Plan of Treatment: You present complaining of several days abd pain brought by family for lethargy, difficulty ambulating, fever. CT scan was done showing Mild left hydroureteronephrosis secondary to a 1.5 cm calculus proximal left ureter. No evidence of appendicitis or colitis. You were admitted to surgery with sepsis, ureterolithiasis, left pyelonephritis and hydroureteronephrosis. Your urinalyisis was positive, and you were started on antibiotics. You had cystoscopy and ureteric stent placement in left ureter on 3/5 to help aid the stone pass. You need to continue taking your antibiotics (Ceftin) as prescribed. You need to follow up with Urology in the outpatient setting for monitorig and removal of the stent. You should follow up with your PCP within 1 week of discharge for monitoring and medication adjustment.      SECONDARY DISCHARGE DIAGNOSES  Diagnosis: Ureterolithiasis  Assessment and Plan of Treatment: You had a CT scan done on admission showing Mild left hydroureteronephrosis secondary to a 1.5 cm calculus proximal left ureter. No evidence of appendicitis or colitis. You were admitted to surgery with sepsis, ureterolithiasis, left pyelonephritis and hydroureteronephrosis. You had cystoscopy and ureteric stent placement in left ureter on 3/5. You need to follow up with Urology in the outpatient setting for monitorig and removal of the stent.    Diagnosis: Urinary tract infection with hematuria, site unspecified  Assessment and Plan of Treatment: You were found to have E.coli bacteria growing in your urine. You were stared on IV antibiotics and will be discharged on oral antibiotics. you need to continue taking your medications as prescribed. You should follow up with your PCP within 1 week of discharge for monitoring and medication adjustment.    Diagnosis: Bacteremia, escherichia coli  Assessment and Plan of Treatment: The same bacteria that was growing in your urine has spread to your blood. Your first set of blood cultures were positive. You were started on IV antibiotics. Your repeat blood cultures were negative. You need to continue taking the antibiotic (Ceftin) as prescribed.    Diagnosis: RICKEY (acute kidney injury)  Assessment and Plan of Treatment: On admission your creatinine was slighly elevated. Likley secondary to the injury being caused by the stone to your kidneys. After your procedure, your creatinine normalized. You should continue to keep yourself hydrated to allow the stone to pass, and keep good perfussion to your kidneys. You should follow up with your PCP within 1 week of discharge for monitoring and medication adjustment.    Diagnosis: DM (diabetes mellitus)  Assessment and Plan of Treatment: You have a history of diabetes and are on 3 different medications for this. You should continue to take your diabetes medications as prescribed. You should follow up with your PCP within 1 week of discharge for monitoring and medication adjustment.    Diagnosis: Asthma  Assessment and Plan of Treatment: You have a past medical history of asthma and take an inhalor when needed. You should continue to take your medications as prescribed. You should follow up with your PCP within 1 week of discharge for monitoring and medication adjustment.    Diagnosis: HLD (hyperlipidemia)  Assessment and Plan of Treatment: You have a history of hyperlipidemia and take medications to controll your cholesterol. You should continue to take your cholesterol medication as prescribed. You should follow up with your PCP within 1 week of discharge for monitoring and medication adjustment.    Diagnosis: Hypothyroidism  Assessment and Plan of Treatment: You have a history of Hypothyroidism and take Levothyroxine to control it. You should continue to take your medications as prescribed. You should follow up with your PCP within 1 week of discharge for monitoring and medication adjustment.     PRINCIPAL DISCHARGE DIAGNOSIS  Diagnosis: Bacteremia, escherichia coli  Assessment and Plan of Treatment: You present complaining of several days abd pain brought by family for lethargy, difficulty ambulating, fever. CT scan was done showing Mild left hydroureteronephrosis secondary to a 1.5 cm calculus proximal left ureter. You were admitted to Hospital for sepsis, likely ureterolithiasis, left pyelonephritis and hydroureteronephrosis. Your urinalyisis was positive, and your blood culture grew Ecoli and you were started on antibiotics. Your repeat blood culutres are negative. You had cystoscopy and ureteric stent placement in left ureter on 3/5 to help aid the stone pass. You need to continue taking your antibiotics (Ceftin) as prescribed. You need to follow up with Urology in the outpatient setting for monitorig and removal of the stent. You should follow up with your PCP within 1 week of discharge for monitoring and medication adjustment.      SECONDARY DISCHARGE DIAGNOSES  Diagnosis: HTN (hypertension)  Assessment and Plan of Treatment: Continue with your home medication with Losartan.    Diagnosis: DM (diabetes mellitus)  Assessment and Plan of Treatment: You have a history of diabetes and are on 3 different medications for this. Your Hemoglobin A1c was 6.9 on admission. You should continue to take your diabetes medications as prescribed. You should follow up with your PCP within 1 week of discharge for monitoring and medication adjustment.    Diagnosis: HLD (hyperlipidemia)  Assessment and Plan of Treatment: You have a history of hyperlipidemia and take medications to controll your cholesterol. You should continue to take your cholesterol medication as prescribed. You should follow up with your PCP within 1 week of discharge for monitoring and medication adjustment.    Diagnosis: Hypothyroidism  Assessment and Plan of Treatment: You have a history of Hypothyroidism and take Levothyroxine to control it. You should continue to take your medications as prescribed. You should follow up with your PCP within 1 week of discharge for monitoring and medication adjustment.    Diagnosis: Asthma  Assessment and Plan of Treatment: You have a past medical history of asthma and take an inhalor when needed. You should continue to take your medications as prescribed. You should follow up with your PCP within 1 week of discharge for monitoring and medication adjustment.    Diagnosis: RICKEY (acute kidney injury)  Assessment and Plan of Treatment: On admission your creatinine was slighly elevated. Likley secondary to the injury being caused by the stone to your kidneys and or Infection. After your procedure and Intravenous hydration, your creatinine normalized. You should continue to keep yourself hydrated to allow the stone to pass, and keep good perfussion to your kidneys. You should follow up with your PCP within 1 week of discharge for monitoring and medication adjustment.    Diagnosis: Ureterolithiasis  Assessment and Plan of Treatment: You had a CT scan done on admission showing Mild left hydroureteronephrosis secondary to a 1.5 cm calculus proximal left ureter. No evidence of appendicitis or colitis. You were admitted to surgery with sepsis, ureterolithiasis, left pyelonephritis and hydroureteronephrosis. You had cystoscopy and ureteric stent placement in left ureter on 3/5. You need to follow up with Urology in the outpatient setting for monitorig and removal of the stent.    Diagnosis: Urinary tract infection with hematuria, site unspecified  Assessment and Plan of Treatment: You were found to have E.coli bacteria growing in your urine. You were stared on IV antibiotics and will be discharged on oral antibiotics. you need to continue taking your medications as prescribed. You should follow up with your PCP within 1 week of discharge for monitoring and medication adjustment.     PRINCIPAL DISCHARGE DIAGNOSIS  Diagnosis: Bacteremia, escherichia coli  Assessment and Plan of Treatment: You presented to ED complaining of several days abdominal pain brought by family for worsening lethargy and difficulty ambulating with  fever. CT scan was done showing Mild left hydroureteronephrosis (swelling of the kidneys) secondary to a 1.5 cm calculus (stone) proximal to the left ureter. You were admitted to Hospital for Sepsis, due to complicated Urinary tract infection likely precipitated by ureterolithiasis, left pyelonephritis and hydroureteronephrosis (due to obstruction to the flow of urine) . Your urinalyisis was positive, and your blood culture grew E. Coli and you were started on antibiotics. Your repeat blood culutres returned negative meaning clearing of bacteria from the blood stream. You had cystoscopy (looking into the bladder with camera) and ureteric stent placement in left ureter on 3/5/ 21 to help aid the stone pass. You need to continue taking your antibiotics (Ceftin) as prescribed. You need to follow up with Urology in the outpatient setting for monitorig and removal of the stent. You should follow up with your PCP within 1 week of discharge for monitoring and medication adjustment.      SECONDARY DISCHARGE DIAGNOSES  Diagnosis: RICKEY (acute kidney injury)  Assessment and Plan of Treatment: On admission your creatinine was slighly elevated. Likley secondary to the injury being caused by the stone to your kidneys and or Infection. After your procedure and Intravenous hydration, your creatinine normalized. You should continue to keep yourself hydrated to allow the stone to pass, and keep good perfussion to your kidneys. You should follow up with your PCP within 1 week of discharge for monitoring and medication adjustment.    Diagnosis: Ureterolithiasis  Assessment and Plan of Treatment: You had a CT scan done on admission showing Mild left hydroureteronephrosis secondary to a 1.5 cm calculus proximal left ureter. No evidence of appendicitis or colitis. You were admitted to surgery with sepsis, ureterolithiasis, left pyelonephritis and hydroureteronephrosis. You had cystoscopy and ureteric stent placement in left ureter on 3/5/21. You need to follow up with Urology in the outpatient setting for monitorig and removal of the stent.  Please follow up with Urologist Dr. Benton as outpatient in 1-2 weeks to have arrangements for stent to be removed.    Diagnosis: Urinary tract infection with hematuria, site unspecified  Assessment and Plan of Treatment: You were found to have E.coli bacteria growing in your urine. You were stared on IV antibiotics and will be discharged on oral antibiotics. you need to continue taking your medications as prescribed. You should follow up with your PCP within 1 week of discharge for monitoring and medication adjustment.    Diagnosis: HTN (hypertension)  Assessment and Plan of Treatment: Continue with your home medication with Losartan.    Diagnosis: Hypothyroidism  Assessment and Plan of Treatment: You have a history of Hypothyroidism and take Levothyroxine to control it. You should continue to take your medications as prescribed. You should follow up with your PCP within 1 week of discharge for monitoring and medication adjustment. Check TSH with your PCP.    Diagnosis: HLD (hyperlipidemia)  Assessment and Plan of Treatment: You have a history of hyperlipidemia and take medications to controll your cholesterol. You should continue to take your cholesterol medication as prescribed. You should follow up with your PCP within 1 week of discharge for monitoring and medication adjustment.    Diagnosis: Asthma  Assessment and Plan of Treatment: You have a past medical history of asthma and take an inhalor when needed. You should continue to take your medications as prescribed. You should follow up with your PCP within 1 week of discharge for monitoring and medication adjustment.    Diagnosis: DM (diabetes mellitus)  Assessment and Plan of Treatment: You have a history of diabetes and are on 3 different medications for this. Your Hemoglobin A1c was noted to be  6.9 on admission. You should continue to take your diabetes medications as prescribed. You should follow up with your PCP within 1 week of discharge for monitoring and medication adjustment. Monitor A1c every 3 months. target A1c less than 7.0; Continue to Exercise 30 mins daily at least 5 days a week.     PRINCIPAL DISCHARGE DIAGNOSIS  Diagnosis: Bacteremia, escherichia coli  Assessment and Plan of Treatment: You presented to ED complaining of several days abdominal pain brought by family for worsening lethargy and difficulty ambulating with  fever. CT scan was done showing Mild left hydroureteronephrosis (swelling of the kidneys) secondary to a 1.5 cm calculus (stone) proximal to the left ureter. You were admitted to Hospital for Sepsis, due to complicated Urinary tract infection likely precipitated by ureterolithiasis, left pyelonephritis and hydroureteronephrosis (due to obstruction to the flow of urine) . Your urinalyisis was positive, and your blood culture grew E. Coli and you were started on antibiotics. Your repeat blood culutres returned negative meaning clearing of bacteria from the blood stream. You had cystoscopy (looking into the bladder with camera) and ureteric stent placement in left ureter on 3/5/ 21 to help aid the stone pass. You need to continue taking your antibiotics (Ceftin) as prescribed. for 9 more days to complete 14 daycourse of antibiotics for complicated UTI and infection in the blood stream.  You need to follow up with Urology in the outpatient setting for monitorig and removal of the stent. [facility] next 2 weeks.   You should follow up with your PCP within 1 week of discharge for monitoring and medication adjustment.      SECONDARY DISCHARGE DIAGNOSES  Diagnosis: RICKEY (acute kidney injury)  Assessment and Plan of Treatment: On admission your creatinine was slighly elevated. Likley secondary to the injury being caused by the stone to your kidneys and or Infection. After your procedure and Intravenous hydration, your creatinine normalized. You should continue to keep yourself hydrated to allow the stone to pass, and keep good perfussion to your kidneys. You should follow up with your PCP within 1 week of discharge for monitoring and medication adjustment.    Diagnosis: Ureterolithiasis  Assessment and Plan of Treatment: You had a CT scan done on admission showing Mild left hydroureteronephrosis secondary to a 1.5 cm calculus proximal left ureter. No evidence of appendicitis or colitis. You were admitted to surgery with sepsis, ureterolithiasis, left pyelonephritis and hydroureteronephrosis. You had cystoscopy and ureteric stent placement in left ureter on 3/5/21. You need to follow up with Urology in the outpatient setting for monitorig and removal of the stent.  Please follow up with Urologist Dr. Benton as outpatient in 1-2 weeks to have arrangements for stent to be removed.    Diagnosis: Urinary tract infection with hematuria, site unspecified  Assessment and Plan of Treatment: You were found to have E.coli bacteria growing in your urine. You were stared on IV antibiotics and will be discharged on oral antibiotics. you need to continue taking your medications as prescribed. You should follow up with your PCP within 1 week of discharge for monitoring and medication adjustment.    Diagnosis: HTN (hypertension)  Assessment and Plan of Treatment: Continue with your home medication with Losartan.    Diagnosis: Hypothyroidism  Assessment and Plan of Treatment: You have a history of Hypothyroidism and take Levothyroxine to control it. You should continue to take your medications as prescribed. You should follow up with your PCP within 1 week of discharge for monitoring and medication adjustment. Check TSH with your PCP.    Diagnosis: HLD (hyperlipidemia)  Assessment and Plan of Treatment: You have a history of hyperlipidemia and take medications to controll your cholesterol. You should continue to take your cholesterol medication as prescribed. You should follow up with your PCP within 1 week of discharge for monitoring and medication adjustment.    Diagnosis: Asthma  Assessment and Plan of Treatment: You have a past medical history of asthma and take an inhalor when needed. You should continue to take your medications as prescribed. You should follow up with your PCP within 1 week of discharge for monitoring and medication adjustment.    Diagnosis: DM (diabetes mellitus)  Assessment and Plan of Treatment: You have a history of diabetes and are on 3 different medications for this. Your Hemoglobin A1c was noted to be  6.9 on admission. You should continue to take your diabetes medications as prescribed. You should follow up with your PCP within 1 week of discharge for monitoring and medication adjustment. Monitor A1c every 3 months. target A1c less than 7.0; Continue to Exercise 30 mins daily at least 5 days a week.

## 2021-03-09 NOTE — PHYSICAL THERAPY INITIAL EVALUATION ADULT - GENERAL OBSERVATIONS, REHAB EVAL
Consult received ,EMR, radiology and labs reviewed. Patient received supine in bed, NAD, states feels better. Patient agreed to EVALUATION from Physical Therapist.

## 2021-03-09 NOTE — DISCHARGE NOTE NURSING/CASE MANAGEMENT/SOCIAL WORK - PATIENT PORTAL LINK FT
You can access the FollowMyHealth Patient Portal offered by [facility] by registering at the following website: [email or web address]. By joining [facility]’s FollowMyHealth portal, you will also be able to view your health information using other applications (apps) compatible with our system.

## 2021-03-09 NOTE — DISCHARGE NOTE PROVIDER - ATTENDING COMMENTS
Patient was seen and examined at bedside with Housestaff 3/9/21  Sitting on chair, denies any complains; feeling well    Vital Signs Last 24 Hrs  T(C): 36.6 (09 Mar 2021 12:44), Max: 36.6 (09 Mar 2021 12:44)  T(F): 97.8 (09 Mar 2021 12:44), Max: 97.8 (09 Mar 2021 12:44)  HR: 76 (09 Mar 2021 12:44) (76 - 76)  BP: 140/57 (09 Mar 2021 12:44) (140/57 - 140/57)  RR: 18 (09 Mar 2021 12:44) (18 - 18)  SpO2: 97% (09 Mar 2021 12:44) (97% - 97%)    P/E:  NAD  AAOx3, no new focal deficit   Lungs CTABL  S1S2 WNL, no mrg  Abd soft non tender, BS present   BL LE no calf tenderness or edema     Labs noted     A/P:  E. coli Bacteremia resolving  Acute encephalopathy- resolved; likely secondary to sepsis, hospital acquired delirium   Left pyelonephritis   Mild Left hydroureteronephrosis   Left obstructive ureteral calculus s/p stent 3/5  RICKEY resolved  Normocytic anemia  Hypothyroidism  HTN/ HLD  T2DM    Recommendations  Cont Ceftriaxone; changed to PO Ceftin 500mg BID to complete total 10 days  Neg repeat blood culture 3/6  monitor cr  Restarted home dose Losartan  Maintain BS >140-180  Frequent reorientation, enhanced supervision  DC Juares  PT eval  Supplement iron, full anemia work up as outpatient   Outpatient follow up with Urology for stent removal  PGY1 spoke with daughter and updated  Discussed with Urology Dr. Benton, her office will reach out to set up appt in 2 weeks to remove stent  Patient encouraged fluids 6 to 8 glasses water    See discharge note updated for details

## 2021-03-09 NOTE — DISCHARGE NOTE PROVIDER - PROVIDER TOKENS
PROVIDER:[TOKEN:[[id number]]] PROVIDER:[TOKEN:[[id number]],FOLLOWUP:[2 weeks]],FREE:[LAST:[AHMED],FIRST:[SWETHA],PHONE:[phone]],FAX:[(   )    -],ADDRESS:[PCP],FOLLOWUP:[1 week],ESTABLISHEDPATIENT:[T]]

## 2021-03-09 NOTE — PHYSICAL THERAPY INITIAL EVALUATION ADULT - LEVEL OF INDEPENDENCE: GAIT, REHAB EVAL
Not assessed at this time as Pt. does not require stairs to access/negotiate home environment/stand-by assist

## 2021-03-09 NOTE — DISCHARGE NOTE PROVIDER - HOSPITAL COURSE
Patient is a 74 y/o female with h/o HTN, DM, HLD, hypothyroid, PSH-cholecystectomy, hip sx, who present complaining of several days abd pain brought by family for lethargy, difficulty ambulating, fever. CT scan was done showing Mild left hydroureteronephrosis secondary to a 1.5 cm calculus proximal left ureter. No evidence of appendicitis or colitis. She was admitted to surgery with sepsis, ureterolithiasis, left pyelonephritis and hydroureteronephrosis. She is S/p cysto and ureteric stent placement in left ureter on 3/5. Medicine consult was called for post-operative management. Patient was agitated, delirious, requiring few doses of Haldol. Started on Seroquel. Blood culture is positive for E.Coli pansensitive. Patient is back to baseline, seroquel discontinued. Was treated with IV antibiotics during admission and will be discharged on oral Ceftin 500 bid for total of 14 days.     Given patient's improved clinical status and current hemodynamic stability, decision was made to discharge. Case discussed with Attending.  Please refer to patient's complete medical chart with documents for a full hospital course, for this is only a brief summary.

## 2021-03-09 NOTE — DISCHARGE NOTE PROVIDER - NSDCMRMEDTOKEN_GEN_ALL_CORE_FT
Alogliptin 25 mg oral tablet: 1 tab(s) orally once a day  aspirin 81 mg oral delayed release tablet: 1 tab(s) orally once a day  atorvastatin 10 mg oral tablet: 1 tab(s) orally once a day  budesonide:   docusate potassium 100 mg oral capsule: 1 cap(s) orally 3 times a day  gabapentin 300 mg oral tablet: 1 tab(s) orally 3 times a day  glimepiride 2 mg oral tablet: 1 tab(s) orally once a day  levothyroxine 100 mcg (0.1 mg) oral tablet: 1 tab(s) orally once a day  loratadine 10 mg oral tablet: 1 tab(s) orally once a day  losartan 50 mg oral tablet: 1 tab(s) orally once a day  metFORMIN 500 mg oral tablet: 1 tab(s) orally 2 times a day  montelukast 10 mg oral tablet: 1 tab(s) orally once a day  Ventolin HFA 90 mcg/inh inhalation aerosol: 2 puff(s) inhaled every 6 hours, As Needed  Vitamin D2 50,000 intl units (1.25 mg) oral capsule: 1 cap(s) orally once a week   Alogliptin 25 mg oral tablet: 1 tab(s) orally once a day  aspirin 81 mg oral delayed release tablet: 1 tab(s) orally once a day  atorvastatin 10 mg oral tablet: 1 tab(s) orally once a day  budesonide:   cefuroxime 500 mg oral tablet: 1 tab(s) orally 2 times a day   docusate potassium 100 mg oral capsule: 1 cap(s) orally 3 times a day  ferrous sulfate 325 mg (65 mg elemental iron) oral tablet: 1 tab(s) orally once a day  gabapentin 300 mg oral tablet: 1 tab(s) orally 3 times a day  glimepiride 2 mg oral tablet: 1 tab(s) orally once a day  levothyroxine 100 mcg (0.1 mg) oral tablet: 1 tab(s) orally once a day  loratadine 10 mg oral tablet: 1 tab(s) orally once a day  losartan 50 mg oral tablet: 1 tab(s) orally once a day  metFORMIN 500 mg oral tablet: 1 tab(s) orally 2 times a day  montelukast 10 mg oral tablet: 1 tab(s) orally once a day  Ventolin HFA 90 mcg/inh inhalation aerosol: 2 puff(s) inhaled every 6 hours, As Needed  Vitamin D2 50,000 intl units (1.25 mg) oral capsule: 1 cap(s) orally once a week   Alogliptin 25 mg oral tablet: 1 tab(s) orally once a day  aspirin 81 mg oral delayed release tablet: 1 tab(s) orally once a day  atorvastatin 10 mg oral tablet: 1 tab(s) orally once a day  budesonide:   cefuroxime 500 mg oral tablet: 1 tab(s) orally 2 times a day   ferrous sulfate 325 mg (65 mg elemental iron) oral tablet: 1 tab(s) orally once a day  gabapentin 300 mg oral tablet: 1 tab(s) orally 3 times a day  glimepiride 2 mg oral tablet: 1 tab(s) orally once a day  levothyroxine 100 mcg (0.1 mg) oral tablet: 1 tab(s) orally once a day  loratadine 10 mg oral tablet: 1 tab(s) orally once a day  losartan 50 mg oral tablet: 1 tab(s) orally once a day  metFORMIN 500 mg oral tablet: 1 tab(s) orally 2 times a day  montelukast 10 mg oral tablet: 1 tab(s) orally once a day  senna 8.6 mg oral tablet: 1 tab(s) orally once a day (at bedtime)   Ventolin HFA 90 mcg/inh inhalation aerosol: 2 puff(s) inhaled every 6 hours, As Needed  Vitamin D2 50,000 intl units (1.25 mg) oral capsule: 1 cap(s) orally once a week

## 2021-03-09 NOTE — DISCHARGE NOTE PROVIDER - CARE PROVIDER_API CALL
Sirena Benton (MD; MPH)  Urology  [address] [facility] [address] [place] [address]  Phone: [phone]  Fax: [phone]  Follow Up Time:    Sirena Benton (MD; MPH)  Urology  [address] [facility], [address] [place] [address]  Phone: [phone]  Fax: [phone]  Follow Up Time: 2 weeks    SWETHA MERAZ  PCP  Phone: [phone]  Fax: (   )    -  Established Patient  Follow Up Time: 1 week

## 2021-03-09 NOTE — DISCHARGE NOTE PROVIDER - CARE PROVIDERS DIRECT ADDRESSES
[email or web address]

## 2021-03-11 PROBLEM — E78.5 HYPERLIPIDEMIA, UNSPECIFIED: Chronic | Status: ACTIVE | Noted: 2021-03-04

## 2021-03-11 PROBLEM — Z00.00 ENCOUNTER FOR PREVENTIVE HEALTH EXAMINATION: Status: ACTIVE | Noted: 2021-03-11

## 2021-03-11 PROBLEM — E03.9 HYPOTHYROIDISM, UNSPECIFIED: Chronic | Status: ACTIVE | Noted: 2021-03-04

## 2021-03-11 PROBLEM — E11.9 TYPE 2 DIABETES MELLITUS WITHOUT COMPLICATIONS: Chronic | Status: ACTIVE | Noted: 2021-03-04

## 2021-03-11 PROBLEM — I10 ESSENTIAL (PRIMARY) HYPERTENSION: Chronic | Status: ACTIVE | Noted: 2021-03-04

## 2021-03-11 LAB
CULTURE RESULTS: SIGNIFICANT CHANGE UP
SPECIMEN SOURCE: SIGNIFICANT CHANGE UP

## 2021-03-13 LAB — GLUCOSE BLDC GLUCOMTR-MCNC: 130 MG/DL — HIGH (ref 70–99)

## 2021-03-14 LAB — METFORMIN SERPL-MCNC: 0.19 MCG/ML — SIGNIFICANT CHANGE UP

## 2021-03-17 ENCOUNTER — APPOINTMENT (OUTPATIENT)
Dept: UROLOGY | Facility: CLINIC | Age: 76
End: 2021-03-17

## 2021-04-07 DIAGNOSIS — Z71.89 OTHER SPECIFIED COUNSELING: ICD-10-CM

## 2021-12-01 PROCEDURE — G9005: CPT

## 2022-01-12 ENCOUNTER — TRANSCRIPTION ENCOUNTER (OUTPATIENT)
Age: 77
End: 2022-01-12

## 2022-01-14 NOTE — PROGRESS NOTE ADULT - SUBJECTIVE AND OBJECTIVE BOX
Post Operative Day #: 2    SUBJECTIVE: 75y Female with urosepsis s/p cysto/stent for L prox 1.3cm stone    INTERVAL HPI/OVERNIGHT EVENTS:    Per RN, pt more alert today  Low K today, received IV K, PH yesterday      MEDICATIONS  (STANDING):  cefTRIAXone   IVPB      cefTRIAXone   IVPB 2000 milliGRAM(s) IV Intermittent once  dextrose 40% Gel 15 Gram(s) Oral once  dextrose 5%. 1000 milliLiter(s) (100 mL/Hr) IV Continuous <Continuous>  dextrose 5%. 1000 milliLiter(s) (50 mL/Hr) IV Continuous <Continuous>  dextrose 50% Injectable 25 Gram(s) IV Push once  dextrose 50% Injectable 12.5 Gram(s) IV Push once  dextrose 50% Injectable 25 Gram(s) IV Push once  glucagon  Injectable 1 milliGRAM(s) IntraMuscular once  heparin   Injectable 5000 Unit(s) SubCutaneous every 8 hours  insulin glargine Injectable (LANTUS) 5 Unit(s) SubCutaneous at bedtime  insulin lispro (ADMELOG) corrective regimen sliding scale   SubCutaneous every 6 hours  lactated ringers. 1000 milliLiter(s) (75 mL/Hr) IV Continuous <Continuous>  levothyroxine Injectable 75 MICROGram(s) IV Push <User Schedule>  potassium chloride  10 mEq/100 mL IVPB 10 milliEquivalent(s) IV Intermittent every 1 hour  potassium phosphate IVPB 15 milliMole(s) IV Intermittent once  QUEtiapine 25 milliGRAM(s) Oral at bedtime    MEDICATIONS  (PRN):  acetaminophen   Tablet .. 650 milliGRAM(s) Oral every 6 hours PRN Temp greater or equal to 38C (100.4F), Mild Pain (1 - 3)  haloperidol    Injectable 2.5 milliGRAM(s) IV Push every 6 hours PRN agitation  morphine  - Injectable 2 milliGRAM(s) IV Push every 4 hours PRN Moderate Pain (4 - 6)  ondansetron Injectable 4 milliGRAM(s) IV Push every 6 hours PRN Nausea and/or Vomiting          OBJECTIVE:  ICU Vital Signs Last 24 Hrs  T(C): 37.6 (07 Mar 2021 05:05), Max: 37.6 (06 Mar 2021 20:42)  T(F): 99.7 (07 Mar 2021 05:05), Max: 99.7 (07 Mar 2021 05:05)  HR: 98 (07 Mar 2021 05:05) (83 - 99)  BP: 163/79 (07 Mar 2021 05:05) (134/89 - 163/79)  BP(mean): --  ABP: --  ABP(mean): --  RR: 18 (07 Mar 2021 05:05) (18 - 18)  SpO2: 100% (07 Mar 2021 05:05) (95% - 100%)      Physical Exam:    Gen: awake, not alert or oriented NAD  Abdomen: soft NT ND  Pelvic: + Juares catheter in place with clear urine  Extremities: warm to touch no c/c/e      I&O's Detail    06 Mar 2021 07:01  -  07 Mar 2021 07:00  --------------------------------------------------------  IN:  Total IN: 0 mL    OUT:    Indwelling Catheter - Urethral (mL): 2400 mL  Total OUT: 2400 mL    Total NET: -2400 mL      07 Mar 2021 07:01  -  07 Mar 2021 13:08  --------------------------------------------------------  IN:  Total IN: 0 mL    OUT:    Indwelling Catheter - Urethral (mL): 550 mL  Total OUT: 550 mL    Total NET: -550 mL                Labs:                          10.4   12.27 )-----------( 93       ( 06 Mar 2021 06:14 )             32.0                             9.8    8.46  )-----------( 124      ( 07 Mar 2021 07:42 )             29.2     03-06    142  |  107  |  13  ----------------------------<  104<H>  3.2<L>   |  22  |  0.88    03-07    138  |  102  |  10  ----------------------------<  149<H>  2.5<LL>   |  25  |  0.77    Ca    7.9<L>      07 Mar 2021 07:42  Phos  2.1     03-07  Mg     1.6     03-07                 [Arthralgias] : arthralgias [Limb Pain] : limb pain [As Noted in HPI] : as noted in HPI [Negative] : Heme/Lymph

## 2022-02-26 ENCOUNTER — TRANSCRIPTION ENCOUNTER (OUTPATIENT)
Age: 77
End: 2022-02-26

## 2022-08-16 ENCOUNTER — TRANSCRIPTION ENCOUNTER (OUTPATIENT)
Age: 77
End: 2022-08-16

## 2022-08-17 ENCOUNTER — INPATIENT (INPATIENT)
Facility: HOSPITAL | Age: 77
LOS: 6 days | Discharge: NURSING, REHAB OR LONG-TERM CARE | DRG: 871 | End: 2022-08-24
Attending: INTERNAL MEDICINE | Admitting: INTERNAL MEDICINE
Payer: MEDICAID

## 2022-08-17 VITALS
SYSTOLIC BLOOD PRESSURE: 120 MMHG | HEART RATE: 87 BPM | DIASTOLIC BLOOD PRESSURE: 72 MMHG | OXYGEN SATURATION: 93 % | TEMPERATURE: 100 F | WEIGHT: 141.98 LBS | RESPIRATION RATE: 19 BRPM | HEIGHT: 60 IN

## 2022-08-17 DIAGNOSIS — Z90.49 ACQUIRED ABSENCE OF OTHER SPECIFIED PARTS OF DIGESTIVE TRACT: Chronic | ICD-10-CM

## 2022-08-17 DIAGNOSIS — N13.2 HYDRONEPHROSIS WITH RENAL AND URETERAL CALCULOUS OBSTRUCTION: ICD-10-CM

## 2022-08-17 DIAGNOSIS — Z98.890 OTHER SPECIFIED POSTPROCEDURAL STATES: Chronic | ICD-10-CM

## 2022-08-17 LAB
ALBUMIN SERPL ELPH-MCNC: 3.5 G/DL — SIGNIFICANT CHANGE UP (ref 3.5–5)
ALP SERPL-CCNC: 26 U/L — LOW (ref 40–120)
ALT FLD-CCNC: 34 U/L DA — SIGNIFICANT CHANGE UP (ref 10–60)
ANION GAP SERPL CALC-SCNC: 12 MMOL/L — SIGNIFICANT CHANGE UP (ref 5–17)
APPEARANCE UR: CLEAR — SIGNIFICANT CHANGE UP
APTT BLD: 29.3 SEC — SIGNIFICANT CHANGE UP (ref 27.5–35.5)
AST SERPL-CCNC: 31 U/L — SIGNIFICANT CHANGE UP (ref 10–40)
BASOPHILS # BLD AUTO: 0.06 K/UL — SIGNIFICANT CHANGE UP (ref 0–0.2)
BASOPHILS NFR BLD AUTO: 0.3 % — SIGNIFICANT CHANGE UP (ref 0–2)
BILIRUB SERPL-MCNC: 0.6 MG/DL — SIGNIFICANT CHANGE UP (ref 0.2–1.2)
BILIRUB UR-MCNC: NEGATIVE — SIGNIFICANT CHANGE UP
BUN SERPL-MCNC: 22 MG/DL — HIGH (ref 7–18)
CALCIUM SERPL-MCNC: 9.7 MG/DL — SIGNIFICANT CHANGE UP (ref 8.4–10.5)
CHLORIDE SERPL-SCNC: 102 MMOL/L — SIGNIFICANT CHANGE UP (ref 96–108)
CO2 SERPL-SCNC: 20 MMOL/L — LOW (ref 22–31)
COLOR SPEC: YELLOW — SIGNIFICANT CHANGE UP
CREAT SERPL-MCNC: 1.39 MG/DL — HIGH (ref 0.5–1.3)
DIFF PNL FLD: ABNORMAL
EGFR: 40 ML/MIN/1.73M2 — LOW
EOSINOPHIL # BLD AUTO: 0.04 K/UL — SIGNIFICANT CHANGE UP (ref 0–0.5)
EOSINOPHIL NFR BLD AUTO: 0.2 % — SIGNIFICANT CHANGE UP (ref 0–6)
GLUCOSE SERPL-MCNC: 175 MG/DL — HIGH (ref 70–99)
GLUCOSE UR QL: NEGATIVE — SIGNIFICANT CHANGE UP
HCT VFR BLD CALC: 38 % — SIGNIFICANT CHANGE UP (ref 34.5–45)
HGB BLD-MCNC: 12.2 G/DL — SIGNIFICANT CHANGE UP (ref 11.5–15.5)
IMM GRANULOCYTES NFR BLD AUTO: 1 % — SIGNIFICANT CHANGE UP (ref 0–1.5)
INR BLD: 1.02 RATIO — SIGNIFICANT CHANGE UP (ref 0.88–1.16)
KETONES UR-MCNC: NEGATIVE — SIGNIFICANT CHANGE UP
LACTATE SERPL-SCNC: 3.1 MMOL/L — HIGH (ref 0.7–2)
LACTATE SERPL-SCNC: 3.9 MMOL/L — HIGH (ref 0.7–2)
LEUKOCYTE ESTERASE UR-ACNC: ABNORMAL
LYMPHOCYTES # BLD AUTO: 1.2 K/UL — SIGNIFICANT CHANGE UP (ref 1–3.3)
LYMPHOCYTES # BLD AUTO: 6.9 % — LOW (ref 13–44)
MCHC RBC-ENTMCNC: 28.7 PG — SIGNIFICANT CHANGE UP (ref 27–34)
MCHC RBC-ENTMCNC: 32.1 GM/DL — SIGNIFICANT CHANGE UP (ref 32–36)
MCV RBC AUTO: 89.4 FL — SIGNIFICANT CHANGE UP (ref 80–100)
MONOCYTES # BLD AUTO: 1.33 K/UL — HIGH (ref 0–0.9)
MONOCYTES NFR BLD AUTO: 7.6 % — SIGNIFICANT CHANGE UP (ref 2–14)
NEUTROPHILS # BLD AUTO: 14.7 K/UL — HIGH (ref 1.8–7.4)
NEUTROPHILS NFR BLD AUTO: 84 % — HIGH (ref 43–77)
NITRITE UR-MCNC: POSITIVE
NRBC # BLD: 0 /100 WBCS — SIGNIFICANT CHANGE UP (ref 0–0)
PH UR: 6 — SIGNIFICANT CHANGE UP (ref 5–8)
PLATELET # BLD AUTO: 205 K/UL — SIGNIFICANT CHANGE UP (ref 150–400)
POTASSIUM SERPL-MCNC: 4.6 MMOL/L — SIGNIFICANT CHANGE UP (ref 3.5–5.3)
POTASSIUM SERPL-SCNC: 4.6 MMOL/L — SIGNIFICANT CHANGE UP (ref 3.5–5.3)
PROT SERPL-MCNC: 7.9 G/DL — SIGNIFICANT CHANGE UP (ref 6–8.3)
PROT UR-MCNC: 15
PROTHROM AB SERPL-ACNC: 12.1 SEC — SIGNIFICANT CHANGE UP (ref 10.5–13.4)
RBC # BLD: 4.25 M/UL — SIGNIFICANT CHANGE UP (ref 3.8–5.2)
RBC # FLD: 12.8 % — SIGNIFICANT CHANGE UP (ref 10.3–14.5)
SARS-COV-2 RNA SPEC QL NAA+PROBE: SIGNIFICANT CHANGE UP
SODIUM SERPL-SCNC: 134 MMOL/L — LOW (ref 135–145)
SP GR SPEC: 1.01 — SIGNIFICANT CHANGE UP (ref 1.01–1.02)
TROPONIN I, HIGH SENSITIVITY RESULT: 4 NG/L — SIGNIFICANT CHANGE UP
UROBILINOGEN FLD QL: NEGATIVE — SIGNIFICANT CHANGE UP
WBC # BLD: 17.5 K/UL — HIGH (ref 3.8–10.5)
WBC # FLD AUTO: 17.5 K/UL — HIGH (ref 3.8–10.5)

## 2022-08-17 PROCEDURE — 74420 UROGRAPHY RTRGR +-KUB: CPT | Mod: 26,LT

## 2022-08-17 PROCEDURE — 73130 X-RAY EXAM OF HAND: CPT | Mod: 26,LT

## 2022-08-17 PROCEDURE — 74177 CT ABD & PELVIS W/CONTRAST: CPT | Mod: 26,MA

## 2022-08-17 PROCEDURE — 99222 1ST HOSP IP/OBS MODERATE 55: CPT | Mod: 25

## 2022-08-17 PROCEDURE — 70450 CT HEAD/BRAIN W/O DYE: CPT | Mod: 26,MA

## 2022-08-17 PROCEDURE — 52332 CYSTOSCOPY AND TREATMENT: CPT | Mod: LT

## 2022-08-17 PROCEDURE — 71045 X-RAY EXAM CHEST 1 VIEW: CPT | Mod: 26

## 2022-08-17 PROCEDURE — 99285 EMERGENCY DEPT VISIT HI MDM: CPT

## 2022-08-17 DEVICE — STENT URETHRAL PIGTL DBL 6FRX22CM: Type: IMPLANTABLE DEVICE | Site: LEFT | Status: FUNCTIONAL

## 2022-08-17 RX ORDER — SODIUM CHLORIDE 9 MG/ML
1000 INJECTION, SOLUTION INTRAVENOUS
Refills: 0 | Status: DISCONTINUED | OUTPATIENT
Start: 2022-08-17 | End: 2022-08-24

## 2022-08-17 RX ORDER — GABAPENTIN 400 MG/1
300 CAPSULE ORAL THREE TIMES A DAY
Refills: 0 | Status: DISCONTINUED | OUTPATIENT
Start: 2022-08-17 | End: 2022-08-17

## 2022-08-17 RX ORDER — MONTELUKAST 4 MG/1
10 TABLET, CHEWABLE ORAL DAILY
Refills: 0 | Status: DISCONTINUED | OUTPATIENT
Start: 2022-08-17 | End: 2022-08-17

## 2022-08-17 RX ORDER — ONDANSETRON 8 MG/1
4 TABLET, FILM COATED ORAL EVERY 6 HOURS
Refills: 0 | Status: DISCONTINUED | OUTPATIENT
Start: 2022-08-17 | End: 2022-08-24

## 2022-08-17 RX ORDER — ENOXAPARIN SODIUM 100 MG/ML
40 INJECTION SUBCUTANEOUS EVERY 24 HOURS
Refills: 0 | Status: DISCONTINUED | OUTPATIENT
Start: 2022-08-17 | End: 2022-08-24

## 2022-08-17 RX ORDER — CEFTRIAXONE 500 MG/1
1000 INJECTION, POWDER, FOR SOLUTION INTRAMUSCULAR; INTRAVENOUS EVERY 24 HOURS
Refills: 0 | Status: DISCONTINUED | OUTPATIENT
Start: 2022-08-17 | End: 2022-08-17

## 2022-08-17 RX ORDER — SODIUM CHLORIDE 9 MG/ML
1000 INJECTION, SOLUTION INTRAVENOUS
Refills: 0 | Status: DISCONTINUED | OUTPATIENT
Start: 2022-08-17 | End: 2022-08-17

## 2022-08-17 RX ORDER — SENNA PLUS 8.6 MG/1
2 TABLET ORAL AT BEDTIME
Refills: 0 | Status: DISCONTINUED | OUTPATIENT
Start: 2022-08-17 | End: 2022-08-24

## 2022-08-17 RX ORDER — LOSARTAN POTASSIUM 100 MG/1
50 TABLET, FILM COATED ORAL DAILY
Refills: 0 | Status: DISCONTINUED | OUTPATIENT
Start: 2022-08-17 | End: 2022-08-24

## 2022-08-17 RX ORDER — INSULIN LISPRO 100/ML
VIAL (ML) SUBCUTANEOUS AT BEDTIME
Refills: 0 | Status: DISCONTINUED | OUTPATIENT
Start: 2022-08-17 | End: 2022-08-24

## 2022-08-17 RX ORDER — ENOXAPARIN SODIUM 100 MG/ML
40 INJECTION SUBCUTANEOUS EVERY 24 HOURS
Refills: 0 | Status: DISCONTINUED | OUTPATIENT
Start: 2022-08-17 | End: 2022-08-17

## 2022-08-17 RX ORDER — ACETAMINOPHEN 500 MG
650 TABLET ORAL EVERY 6 HOURS
Refills: 0 | Status: DISCONTINUED | OUTPATIENT
Start: 2022-08-17 | End: 2022-08-17

## 2022-08-17 RX ORDER — FENTANYL CITRATE 50 UG/ML
25 INJECTION INTRAVENOUS
Refills: 0 | Status: DISCONTINUED | OUTPATIENT
Start: 2022-08-17 | End: 2022-08-17

## 2022-08-17 RX ORDER — SODIUM CHLORIDE 9 MG/ML
1000 INJECTION INTRAMUSCULAR; INTRAVENOUS; SUBCUTANEOUS ONCE
Refills: 0 | Status: COMPLETED | OUTPATIENT
Start: 2022-08-17 | End: 2022-08-17

## 2022-08-17 RX ORDER — DEXTROSE 50 % IN WATER 50 %
15 SYRINGE (ML) INTRAVENOUS ONCE
Refills: 0 | Status: DISCONTINUED | OUTPATIENT
Start: 2022-08-17 | End: 2022-08-24

## 2022-08-17 RX ORDER — DEXTROSE 50 % IN WATER 50 %
12.5 SYRINGE (ML) INTRAVENOUS ONCE
Refills: 0 | Status: DISCONTINUED | OUTPATIENT
Start: 2022-08-17 | End: 2022-08-17

## 2022-08-17 RX ORDER — CEFEPIME 1 G/1
2000 INJECTION, POWDER, FOR SOLUTION INTRAMUSCULAR; INTRAVENOUS ONCE
Refills: 0 | Status: DISCONTINUED | OUTPATIENT
Start: 2022-08-17 | End: 2022-08-17

## 2022-08-17 RX ORDER — ALBUTEROL 90 UG/1
2 AEROSOL, METERED ORAL EVERY 6 HOURS
Refills: 0 | Status: DISCONTINUED | OUTPATIENT
Start: 2022-08-17 | End: 2022-08-17

## 2022-08-17 RX ORDER — ACETAMINOPHEN 500 MG
650 TABLET ORAL ONCE
Refills: 0 | Status: DISCONTINUED | OUTPATIENT
Start: 2022-08-17 | End: 2022-08-18

## 2022-08-17 RX ORDER — LOSARTAN POTASSIUM 100 MG/1
50 TABLET, FILM COATED ORAL DAILY
Refills: 0 | Status: DISCONTINUED | OUTPATIENT
Start: 2022-08-17 | End: 2022-08-17

## 2022-08-17 RX ORDER — GLUCAGON INJECTION, SOLUTION 0.5 MG/.1ML
1 INJECTION, SOLUTION SUBCUTANEOUS ONCE
Refills: 0 | Status: DISCONTINUED | OUTPATIENT
Start: 2022-08-17 | End: 2022-08-17

## 2022-08-17 RX ORDER — DEXTROSE 50 % IN WATER 50 %
25 SYRINGE (ML) INTRAVENOUS ONCE
Refills: 0 | Status: DISCONTINUED | OUTPATIENT
Start: 2022-08-17 | End: 2022-08-17

## 2022-08-17 RX ORDER — GLUCAGON INJECTION, SOLUTION 0.5 MG/.1ML
1 INJECTION, SOLUTION SUBCUTANEOUS ONCE
Refills: 0 | Status: DISCONTINUED | OUTPATIENT
Start: 2022-08-17 | End: 2022-08-24

## 2022-08-17 RX ORDER — SODIUM CHLORIDE 9 MG/ML
1000 INJECTION INTRAMUSCULAR; INTRAVENOUS; SUBCUTANEOUS ONCE
Refills: 0 | Status: DISCONTINUED | OUTPATIENT
Start: 2022-08-17 | End: 2022-08-18

## 2022-08-17 RX ORDER — ONDANSETRON 8 MG/1
4 TABLET, FILM COATED ORAL EVERY 6 HOURS
Refills: 0 | Status: DISCONTINUED | OUTPATIENT
Start: 2022-08-17 | End: 2022-08-17

## 2022-08-17 RX ORDER — INSULIN LISPRO 100/ML
VIAL (ML) SUBCUTANEOUS
Refills: 0 | Status: DISCONTINUED | OUTPATIENT
Start: 2022-08-17 | End: 2022-08-24

## 2022-08-17 RX ORDER — SODIUM CHLORIDE 9 MG/ML
1000 INJECTION, SOLUTION INTRAVENOUS
Refills: 0 | Status: DISCONTINUED | OUTPATIENT
Start: 2022-08-17 | End: 2022-08-19

## 2022-08-17 RX ORDER — ACETAMINOPHEN 500 MG
1000 TABLET ORAL ONCE
Refills: 0 | Status: DISCONTINUED | OUTPATIENT
Start: 2022-08-17 | End: 2022-08-17

## 2022-08-17 RX ORDER — ASPIRIN/CALCIUM CARB/MAGNESIUM 324 MG
81 TABLET ORAL DAILY
Refills: 0 | Status: DISCONTINUED | OUTPATIENT
Start: 2022-08-17 | End: 2022-08-17

## 2022-08-17 RX ORDER — ACETAMINOPHEN 500 MG
650 TABLET ORAL ONCE
Refills: 0 | Status: COMPLETED | OUTPATIENT
Start: 2022-08-17 | End: 2022-08-17

## 2022-08-17 RX ORDER — MONTELUKAST 4 MG/1
10 TABLET, CHEWABLE ORAL DAILY
Refills: 0 | Status: DISCONTINUED | OUTPATIENT
Start: 2022-08-17 | End: 2022-08-24

## 2022-08-17 RX ORDER — SENNA PLUS 8.6 MG/1
2 TABLET ORAL AT BEDTIME
Refills: 0 | Status: DISCONTINUED | OUTPATIENT
Start: 2022-08-17 | End: 2022-08-17

## 2022-08-17 RX ORDER — DEXTROSE 50 % IN WATER 50 %
25 SYRINGE (ML) INTRAVENOUS ONCE
Refills: 0 | Status: DISCONTINUED | OUTPATIENT
Start: 2022-08-17 | End: 2022-08-24

## 2022-08-17 RX ORDER — INSULIN LISPRO 100/ML
VIAL (ML) SUBCUTANEOUS
Refills: 0 | Status: DISCONTINUED | OUTPATIENT
Start: 2022-08-17 | End: 2022-08-17

## 2022-08-17 RX ORDER — ACETAMINOPHEN 500 MG
650 TABLET ORAL EVERY 6 HOURS
Refills: 0 | Status: DISCONTINUED | OUTPATIENT
Start: 2022-08-17 | End: 2022-08-18

## 2022-08-17 RX ORDER — HYDROMORPHONE HYDROCHLORIDE 2 MG/ML
0.5 INJECTION INTRAMUSCULAR; INTRAVENOUS; SUBCUTANEOUS EVERY 4 HOURS
Refills: 0 | Status: DISCONTINUED | OUTPATIENT
Start: 2022-08-17 | End: 2022-08-17

## 2022-08-17 RX ORDER — CEFEPIME 1 G/1
2000 INJECTION, POWDER, FOR SOLUTION INTRAMUSCULAR; INTRAVENOUS ONCE
Refills: 0 | Status: COMPLETED | OUTPATIENT
Start: 2022-08-17 | End: 2022-08-17

## 2022-08-17 RX ORDER — HYDROMORPHONE HYDROCHLORIDE 2 MG/ML
0.5 INJECTION INTRAMUSCULAR; INTRAVENOUS; SUBCUTANEOUS EVERY 4 HOURS
Refills: 0 | Status: DISCONTINUED | OUTPATIENT
Start: 2022-08-17 | End: 2022-08-20

## 2022-08-17 RX ORDER — DEXTROSE 50 % IN WATER 50 %
15 SYRINGE (ML) INTRAVENOUS ONCE
Refills: 0 | Status: DISCONTINUED | OUTPATIENT
Start: 2022-08-17 | End: 2022-08-17

## 2022-08-17 RX ORDER — CEFTRIAXONE 500 MG/1
1000 INJECTION, POWDER, FOR SOLUTION INTRAMUSCULAR; INTRAVENOUS EVERY 24 HOURS
Refills: 0 | Status: DISCONTINUED | OUTPATIENT
Start: 2022-08-17 | End: 2022-08-19

## 2022-08-17 RX ORDER — ACETAMINOPHEN 500 MG
1000 TABLET ORAL ONCE
Refills: 0 | Status: COMPLETED | OUTPATIENT
Start: 2022-08-17 | End: 2022-08-17

## 2022-08-17 RX ORDER — GABAPENTIN 400 MG/1
300 CAPSULE ORAL THREE TIMES A DAY
Refills: 0 | Status: DISCONTINUED | OUTPATIENT
Start: 2022-08-17 | End: 2022-08-24

## 2022-08-17 RX ORDER — INSULIN LISPRO 100/ML
VIAL (ML) SUBCUTANEOUS AT BEDTIME
Refills: 0 | Status: DISCONTINUED | OUTPATIENT
Start: 2022-08-17 | End: 2022-08-17

## 2022-08-17 RX ORDER — ALBUTEROL 90 UG/1
2 AEROSOL, METERED ORAL EVERY 6 HOURS
Refills: 0 | Status: DISCONTINUED | OUTPATIENT
Start: 2022-08-17 | End: 2022-08-24

## 2022-08-17 RX ORDER — LEVOTHYROXINE SODIUM 125 MCG
100 TABLET ORAL DAILY
Refills: 0 | Status: DISCONTINUED | OUTPATIENT
Start: 2022-08-17 | End: 2022-08-18

## 2022-08-17 RX ORDER — OXYCODONE AND ACETAMINOPHEN 5; 325 MG/1; MG/1
1 TABLET ORAL EVERY 6 HOURS
Refills: 0 | Status: DISCONTINUED | OUTPATIENT
Start: 2022-08-17 | End: 2022-08-17

## 2022-08-17 RX ORDER — OXYCODONE AND ACETAMINOPHEN 5; 325 MG/1; MG/1
1 TABLET ORAL EVERY 6 HOURS
Refills: 0 | Status: DISCONTINUED | OUTPATIENT
Start: 2022-08-17 | End: 2022-08-20

## 2022-08-17 RX ORDER — LEVOTHYROXINE SODIUM 125 MCG
100 TABLET ORAL DAILY
Refills: 0 | Status: DISCONTINUED | OUTPATIENT
Start: 2022-08-17 | End: 2022-08-17

## 2022-08-17 RX ORDER — ASPIRIN/CALCIUM CARB/MAGNESIUM 324 MG
81 TABLET ORAL DAILY
Refills: 0 | Status: DISCONTINUED | OUTPATIENT
Start: 2022-08-17 | End: 2022-08-24

## 2022-08-17 RX ADMIN — CEFTRIAXONE 100 MILLIGRAM(S): 500 INJECTION, POWDER, FOR SOLUTION INTRAMUSCULAR; INTRAVENOUS at 14:22

## 2022-08-17 RX ADMIN — ALBUTEROL 2 PUFF(S): 90 AEROSOL, METERED ORAL at 14:23

## 2022-08-17 RX ADMIN — ENOXAPARIN SODIUM 40 MILLIGRAM(S): 100 INJECTION SUBCUTANEOUS at 14:18

## 2022-08-17 RX ADMIN — Medication 100 MICROGRAM(S): at 14:19

## 2022-08-17 RX ADMIN — Medication 81 MILLIGRAM(S): at 14:19

## 2022-08-17 RX ADMIN — SODIUM CHLORIDE 1000 MILLILITER(S): 9 INJECTION INTRAMUSCULAR; INTRAVENOUS; SUBCUTANEOUS at 11:47

## 2022-08-17 RX ADMIN — Medication 325 MILLIGRAM(S): at 11:01

## 2022-08-17 RX ADMIN — Medication 650 MILLIGRAM(S): at 13:54

## 2022-08-17 RX ADMIN — CEFEPIME 100 MILLIGRAM(S): 1 INJECTION, POWDER, FOR SOLUTION INTRAMUSCULAR; INTRAVENOUS at 11:01

## 2022-08-17 RX ADMIN — SODIUM CHLORIDE 110 MILLILITER(S): 9 INJECTION, SOLUTION INTRAVENOUS at 14:08

## 2022-08-17 RX ADMIN — LOSARTAN POTASSIUM 50 MILLIGRAM(S): 100 TABLET, FILM COATED ORAL at 14:18

## 2022-08-17 RX ADMIN — GABAPENTIN 300 MILLIGRAM(S): 400 CAPSULE ORAL at 14:18

## 2022-08-17 NOTE — ED ADULT NURSE NOTE - OBJECTIVE STATEMENT
pt is here with her daughter as per daughter pt is drowsy , was unable to get up this morning and fell on her back ( witnessed by daughter )

## 2022-08-17 NOTE — H&P ADULT - ASSESSMENT
76 y.o. F with sepsis secondary to UTI, L UPJ calculus obstruction    -Admit to surgery under Dr. Willis  -OR today for cysto, L stent insertion  -Keep NPO except meds  -IVF resuscitation  -Abx  -f/u urine & blood cx  -Pain control prn  -DVT ppx    RICKEY  -IVF resuscitation  -L stent drainage  -Trend renal function    DM  -ISS while inhouse    HTN  -con't Losartan    COPD  -con't nebulizer    Hypothyroidism  -con't Synthroid

## 2022-08-17 NOTE — ED PROVIDER NOTE - OBJECTIVE STATEMENT
Lithuanian-speaking,  was daughter at bedside     75 y/o female, history of hypertension, diabetes, hypothyroidism, is coming in w/ generalized weakness and a fall. Per the daughter, patient this morning attempted to get up to go to the bathroom and couldn't on her own. While unattended, she fell from standing onto her back. Denies LOC; no head trauma reported. Currently, patient reports left hand pain. Per the daughter, patient went to sleep at 10:30 PM. At that time, she had no weakness. Reports that she normally uses a cane to ambulate due to a right hip surgery. Daughter reports that patient was complaining of left-sided abdominal pain. Currently, patient denies any abdominal pain. No known drug allergies.

## 2022-08-17 NOTE — PROGRESS NOTE ADULT - SUBJECTIVE AND OBJECTIVE BOX
Urology    Subjective:  Pt resting comfortably. No acute complaints  Juares in place.  No post op events.    T(C): 36.5 (08-17-22 @ 20:30), Max: 38.1 (08-17-22 @ 14:00)  HR: 101 (08-17-22 @ 20:30) (87 - 128)  BP: 118/60 (08-17-22 @ 20:30) (106/60 - 148/68)  RR: 18 (08-17-22 @ 20:30) (16 - 26)  SpO2: 95% (08-17-22 @ 20:30) (93% - 99%)    Physical:  Gen: NAD.  Abd: Soft ND, NT    UO: Cloudy, ashley

## 2022-08-17 NOTE — ED PROVIDER NOTE - CLINICAL SUMMARY MEDICAL DECISION MAKING FREE TEXT BOX
77 y/o female, history of hypertension, diabetes, hypothyroidism, is coming in w/ generalized weakness and a fall. In ED, patient found to be febrile. Exam shows left lower extremity weakness. Not a tPA candidate in the setting of symptoms greater than 4.5 hours. Will obtain CT head, CT abdomen in addition to sepsis labs. 77 y/o female, history of hypertension, diabetes, hypothyroidism, is coming in w/ generalized weakness and a fall. In ED, patient found to be febrile. Exam shows left lower extremity weakness. Not a tPA candidate in the setting of symptoms greater than 4.5 hours. Will obtain CT head, CT abdomen in addition to sepsis labs.    labs show wbc 17.5K, lactate 3.1, UA cw UTI  CThead unremarkable  CT A/P shows Large left proximal ureteral calculus causing moderate hydronephrosis.  @1130am urology consulted who recommends admission under Dr. Willis's service.  Discussed above with patient and daughter who agree with plan above.

## 2022-08-17 NOTE — PROGRESS NOTE ADULT - ASSESSMENT
76 y.o. F with sepsis secondary to UTI, L UPJ calculus obstruction s/p cysto, L stent insertion    -Consistent carb, DASH diet as tolerated  -IVF resuscitation  -Abx  -f/u urine & blood cx  -Pain control prn  -DVT ppx    RICKEY  -IVF resuscitation  -L stent drainage  -Trend renal function    DM  -ISS while inhouse    HTN  -con't Losartan    COPD  -con't nebulizer    Hypothyroidism  -con't Synthroid

## 2022-08-17 NOTE — ED ADULT NURSE NOTE - NSIMPLEMENTINTERV_GEN_ALL_ED
Implemented All Fall with Harm Risk Interventions:  [place] to call system. Call bell, personal items and telephone within reach. Instruct patient to call for assistance. Room bathroom lighting operational. Non-slip footwear when patient is off stretcher. Physically safe environment: no spills, clutter or unnecessary equipment. Stretcher in lowest position, wheels locked, appropriate side rails in place. Provide visual cue, wrist band, yellow gown, etc. Monitor gait and stability. Monitor for mental status changes and reorient to person, place, and time. Review medications for side effects contributing to fall risk. Reinforce activity limits and safety measures with patient and family. Provide visual clues: red socks.

## 2022-08-17 NOTE — ED PROVIDER NOTE - PHYSICAL EXAMINATION
General: mild distress, A&O x3     Heart/Lungs/Abdomen: heart is regular rate, lungs are clear, belly soft, nontender      Musculoskeletal: left hand: no swelling, no ecchymosis, no deformity, 2+ radial pulses, cap refill less than 2 seconds at the distal fingertips     Neuro: A&O x3; 2/5 strength in the left lower extremity

## 2022-08-17 NOTE — H&P ADULT - HISTORY OF PRESENT ILLNESS
76 y.o. F with PMH L UPJ calculus s/p cysto, stent March 2021, HTN, DM, hypothyroid presents to [facility] ER c/o weakness this morning. Pt was attempting to get up to use the restroom when she fell backwards. Denies head trauma and LOC. Admits to L hand pain. Pt also c/o L sided abd pain. Voiding well without dysuria or hematuria. Last meal last night. Daughter states pt did not take her medications today.     Pt was admitted in [facility] March 2021 under Dr. Benton for similar symptoms and diagnosed with L hydronephrosis secondary to L UPJ calculus. Pt underwent cysto, L stent insertion AD#2. Admission complicated by post-op agitation requiring haldol. Blood and urine culture grew pansensitive E.coli. Pt was discharged POD#4 on Ceftin for 14 days.     Daughter states after discharge pt followed up with a urologist at [facility] for stone and stent management. Daughter also notes that pt had urinary hesitancy and had uterus removed to correct issue.

## 2022-08-17 NOTE — PATIENT PROFILE ADULT - FALL HARM RISK - HARM RISK INTERVENTIONS
Communicate Risk of Fall with Harm to all staff/Reinforce activity limits and safety measures with patient and family/Tailored Fall Risk Interventions/Visual Cue: Yellow wristband and red socks/Bed in lowest position, wheels locked, appropriate side rails in place/Call bell, personal items and telephone in reach/Instruct patient to call for assistance before getting out of bed or chair/Non-slip footwear when patient is out of bed/[place] to call system/Physically safe environment - no spills, clutter or unnecessary equipment/Purposeful Proactive Rounding/Room/bathroom lighting operational, light cord in reach Assistance with ambulation/Assistance OOB with selected safe patient handling equipment/Communicate Risk of Fall with Harm to all staff/Discuss with provider need for PT consult/Monitor gait and stability/Provide patient with walking aids - walker, cane, crutches/Reinforce activity limits and safety measures with patient and family/Tailored Fall Risk Interventions/Visual Cue: Yellow wristband and red socks/Bed in lowest position, wheels locked, appropriate side rails in place/Call bell, personal items and telephone in reach/Instruct patient to call for assistance before getting out of bed or chair/Non-slip footwear when patient is out of bed/[place] to call system/Physically safe environment - no spills, clutter or unnecessary equipment/Purposeful Proactive Rounding/Room/bathroom lighting operational, light cord in reach

## 2022-08-17 NOTE — PATIENT PROFILE ADULT - NSPROGENSOURCEINFO_GEN_A_NUR
patient/health record Patient is very weak. Has difficulty speaking. Daughter, Angelic,  provided most of the info./patient/family/health record

## 2022-08-17 NOTE — ED PROVIDER NOTE - CARE PLAN
Principal Discharge DX:	Ureteral stone with hydronephrosis  Secondary Diagnosis:	Acute UTI  Secondary Diagnosis:	Sepsis   1

## 2022-08-18 LAB
A1C WITH ESTIMATED AVERAGE GLUCOSE RESULT: 6.2 % — HIGH (ref 4–5.6)
ANION GAP SERPL CALC-SCNC: 9 MMOL/L — SIGNIFICANT CHANGE UP (ref 5–17)
BUN SERPL-MCNC: 17 MG/DL — SIGNIFICANT CHANGE UP (ref 7–18)
CALCIUM SERPL-MCNC: 9 MG/DL — SIGNIFICANT CHANGE UP (ref 8.4–10.5)
CHLORIDE SERPL-SCNC: 107 MMOL/L — SIGNIFICANT CHANGE UP (ref 96–108)
CO2 SERPL-SCNC: 22 MMOL/L — SIGNIFICANT CHANGE UP (ref 22–31)
CREAT SERPL-MCNC: 1.2 MG/DL — SIGNIFICANT CHANGE UP (ref 0.5–1.3)
EGFR: 47 ML/MIN/1.73M2 — LOW
ESTIMATED AVERAGE GLUCOSE: 131 MG/DL — HIGH (ref 68–114)
GLUCOSE SERPL-MCNC: 120 MG/DL — HIGH (ref 70–99)
GRAM STN FLD: SIGNIFICANT CHANGE UP
HCT VFR BLD CALC: 37.4 % — SIGNIFICANT CHANGE UP (ref 34.5–45)
HCV AB S/CO SERPL IA: 0.09 S/CO — SIGNIFICANT CHANGE UP (ref 0–0.99)
HCV AB SERPL-IMP: SIGNIFICANT CHANGE UP
HGB BLD-MCNC: 11.7 G/DL — SIGNIFICANT CHANGE UP (ref 11.5–15.5)
MCHC RBC-ENTMCNC: 28.3 PG — SIGNIFICANT CHANGE UP (ref 27–34)
MCHC RBC-ENTMCNC: 31.3 GM/DL — LOW (ref 32–36)
MCV RBC AUTO: 90.6 FL — SIGNIFICANT CHANGE UP (ref 80–100)
METHOD TYPE: SIGNIFICANT CHANGE UP
NRBC # BLD: 0 /100 WBCS — SIGNIFICANT CHANGE UP (ref 0–0)
PLATELET # BLD AUTO: 198 K/UL — SIGNIFICANT CHANGE UP (ref 150–400)
POTASSIUM SERPL-MCNC: 3.5 MMOL/L — SIGNIFICANT CHANGE UP (ref 3.5–5.3)
POTASSIUM SERPL-SCNC: 3.5 MMOL/L — SIGNIFICANT CHANGE UP (ref 3.5–5.3)
PROTEUS SP DNA BLD POS QL NAA+NON-PROBE: SIGNIFICANT CHANGE UP
RBC # BLD: 4.13 M/UL — SIGNIFICANT CHANGE UP (ref 3.8–5.2)
RBC # FLD: 13.1 % — SIGNIFICANT CHANGE UP (ref 10.3–14.5)
SODIUM SERPL-SCNC: 138 MMOL/L — SIGNIFICANT CHANGE UP (ref 135–145)
SPECIMEN SOURCE: SIGNIFICANT CHANGE UP
SPECIMEN SOURCE: SIGNIFICANT CHANGE UP
WBC # BLD: 16.66 K/UL — HIGH (ref 3.8–10.5)
WBC # FLD AUTO: 16.66 K/UL — HIGH (ref 3.8–10.5)

## 2022-08-18 PROCEDURE — 93010 ELECTROCARDIOGRAM REPORT: CPT

## 2022-08-18 PROCEDURE — 99232 SBSQ HOSP IP/OBS MODERATE 35: CPT

## 2022-08-18 RX ORDER — LEVOTHYROXINE SODIUM 125 MCG
100 TABLET ORAL DAILY
Refills: 0 | Status: DISCONTINUED | OUTPATIENT
Start: 2022-08-18 | End: 2022-08-24

## 2022-08-18 RX ORDER — SODIUM CHLORIDE 9 MG/ML
1000 INJECTION INTRAMUSCULAR; INTRAVENOUS; SUBCUTANEOUS ONCE
Refills: 0 | Status: COMPLETED | OUTPATIENT
Start: 2022-08-18 | End: 2022-08-18

## 2022-08-18 RX ORDER — CEFEPIME 1 G/1
2000 INJECTION, POWDER, FOR SOLUTION INTRAMUSCULAR; INTRAVENOUS EVERY 12 HOURS
Refills: 0 | Status: DISCONTINUED | OUTPATIENT
Start: 2022-08-19 | End: 2022-08-20

## 2022-08-18 RX ORDER — ACETAMINOPHEN 500 MG
1000 TABLET ORAL ONCE
Refills: 0 | Status: COMPLETED | OUTPATIENT
Start: 2022-08-18 | End: 2022-08-18

## 2022-08-18 RX ORDER — ACETAMINOPHEN 500 MG
650 TABLET ORAL ONCE
Refills: 0 | Status: COMPLETED | OUTPATIENT
Start: 2022-08-18 | End: 2022-08-18

## 2022-08-18 RX ADMIN — Medication 400 MILLIGRAM(S): at 18:45

## 2022-08-18 RX ADMIN — Medication 400 MILLIGRAM(S): at 00:17

## 2022-08-18 RX ADMIN — SODIUM CHLORIDE 110 MILLILITER(S): 9 INJECTION, SOLUTION INTRAVENOUS at 14:15

## 2022-08-18 RX ADMIN — ONDANSETRON 4 MILLIGRAM(S): 8 TABLET, FILM COATED ORAL at 05:52

## 2022-08-18 RX ADMIN — Medication 2: at 08:48

## 2022-08-18 RX ADMIN — Medication 1000 MILLIGRAM(S): at 19:34

## 2022-08-18 RX ADMIN — SENNA PLUS 2 TABLET(S): 8.6 TABLET ORAL at 21:25

## 2022-08-18 RX ADMIN — ENOXAPARIN SODIUM 40 MILLIGRAM(S): 100 INJECTION SUBCUTANEOUS at 05:32

## 2022-08-18 RX ADMIN — Medication 2: at 12:32

## 2022-08-18 RX ADMIN — SODIUM CHLORIDE 110 MILLILITER(S): 9 INJECTION, SOLUTION INTRAVENOUS at 05:32

## 2022-08-18 RX ADMIN — SODIUM CHLORIDE 1000 MILLILITER(S): 9 INJECTION INTRAMUSCULAR; INTRAVENOUS; SUBCUTANEOUS at 14:16

## 2022-08-18 RX ADMIN — CEFTRIAXONE 100 MILLIGRAM(S): 500 INJECTION, POWDER, FOR SOLUTION INTRAMUSCULAR; INTRAVENOUS at 17:14

## 2022-08-18 RX ADMIN — MONTELUKAST 10 MILLIGRAM(S): 4 TABLET, CHEWABLE ORAL at 14:15

## 2022-08-18 RX ADMIN — GABAPENTIN 300 MILLIGRAM(S): 400 CAPSULE ORAL at 21:25

## 2022-08-18 RX ADMIN — Medication 400 MILLIGRAM(S): at 06:17

## 2022-08-18 RX ADMIN — Medication 1000 MILLIGRAM(S): at 07:36

## 2022-08-18 RX ADMIN — Medication 81 MILLIGRAM(S): at 13:00

## 2022-08-18 RX ADMIN — GABAPENTIN 300 MILLIGRAM(S): 400 CAPSULE ORAL at 14:15

## 2022-08-18 RX ADMIN — Medication 1000 MILLIGRAM(S): at 00:17

## 2022-08-18 NOTE — CHART NOTE - NSCHARTNOTEFT_GEN_A_CORE
Patient seen and examined at bedside.   Patient states that abdominal pain has improved.  Patient denies fever, chills, chest pain, sob or any other constitutional symptoms.     PE:   Gen: NAD, resting comfortably     Abd: Soft ND, NT  UO: segura in place, draining yellow    Repeat Vitals   ICU Vital Signs Last 24 Hrs  T(C): 37.7 (18 Aug 2022 14:01), Max: 38.6 (18 Aug 2022 00:22)  T(F): 99.8 (18 Aug 2022 14:01), Max: 101.5 (18 Aug 2022 00:22)  HR: 109 (18 Aug 2022 14:01) (101 - 128)  BP: 115/65 (18 Aug 2022 14:01) (106/60 - 149/70)  BP(mean): 79 (18 Aug 2022 00:22) (68 - 110)  RR: 18 (18 Aug 2022 14:01) (17 - 26)  SpO2: 94% (18 Aug 2022 14:01) (94% - 99%)    O2 Parameters below as of 18 Aug 2022 14:01  Patient On (Oxygen Delivery Method): room air        Plan:   - Restart Home meds  - NS Bolus   -  IV abx   - IV tylenol   - pain meds PRN   - f/u AM labs   - discussed and agreed with Dr. Willis.

## 2022-08-18 NOTE — CHART NOTE - NSCHARTNOTEFT_GEN_A_CORE
Called by nurse for patient complaining of chills and shivering.     Patient seen and examined at bedside.   Patient admitted to mild abdominal pain in the LLQ that she states she has had on and off since yesterday.   Patient complaining of chills but denies any chest pain, sob or any cardiorespiratory symptoms.     PE:   Gen: NAD, shivering, blankets in place   Abd: Soft ND, NT,   UO: segura in place, draining, cloudy, ashley    Plan at bedside:   Repeat Vital-  PENDING   Rectal temp: 101.1  POC Gluscose:133  Repeat EKG: read as sinus tachycardia     Plan:   - Restart Home BP meds  - NS Bolus   - give scheduled dose of IV abx   - IV tylenol   - pain meds PRN   - re-evaluate patient and vitals in 1 hour   - discussed and agreed with Dr. Willis Called by nurse for patient complaining of chills and shivering.     Patient seen and examined at bedside.   Patient admitted to mild abdominal pain in the LLQ that she states she has had on and off since yesterday.   Patient complaining of chills but denies any chest pain, sob or any cardiorespiratory symptoms.     PE:   Gen: NAD, shivering, blankets in place   Abd: Soft ND, NT,   UO: segura in place, draining, cloudy, ashley    Plan at bedside:   Repeat Vital-  BP: 172/60, , oral temp:98.0  Rectal temp: 101.1  POC Gluscose:133  Repeat EKG: read as sinus tachycardia     Plan:   - Restart Home BP meds  - NS Bolus   - give scheduled dose of IV abx   - IV tylenol   - pain meds PRN   - re-evaluate patient and vitals in 1 hour   - discussed and agreed with Dr. Willis

## 2022-08-18 NOTE — CHART NOTE - NSCHARTNOTEFT_GEN_A_CORE
Called by nurse for rectal temperature of 102.    Patient seen and examined at bedside.   Patient denies any acute complaints    PE:   Gen: NAD, resting comfortably     Abd: Soft ND, NT  UO: segura in place, draining yellow    ICU Vital Signs Last 24 Hrs  T(C): 37.8 (18 Aug 2022 19:03), Max: 39.3 (18 Aug 2022 17:48)  T(F): 100 (18 Aug 2022 19:03), Max: 102.7 (18 Aug 2022 17:48)  HR: 116 (18 Aug 2022 19:01) (101 - 133)  BP: 133/61 (18 Aug 2022 19:01) (106/60 - 149/70)  BP(mean): 79 (18 Aug 2022 00:22) (70 - 79)  RR: 19 (18 Aug 2022 19:01) (17 - 21)  SpO2: 100% (18 Aug 2022 19:01) (94% - 100%)    O2 Parameters below as of 18 Aug 2022 19:01  Patient On (Oxygen Delivery Method): room air      Plan:   - IV tylenol   - pain meds prn  - iv abx  - continuous pulse ox and bedside telemetry overnight   - f/u AM labs   - discussed and agreed with Dr. Willis

## 2022-08-18 NOTE — PROGRESS NOTE ADULT - SUBJECTIVE AND OBJECTIVE BOX
Surgery Progress Note     Subjective/24hour Events:   Patient seen and examined. Intermittently febrile and tachycardic overnight. Feeling sick but no N/V, pain controlled. Just feels weak.   Pain controlled. A&Ox3. Doing well. No other complaints.     Vital Signs:  Vital Signs Last 24 Hrs  T(C): 37.9 (18 Aug 2022 05:26), Max: 38.6 (18 Aug 2022 00:22)  T(F): 100.2 (18 Aug 2022 05:26), Max: 101.5 (18 Aug 2022 00:22)  HR: 116 (18 Aug 2022 05:26) (100 - 128)  BP: 149/70 (18 Aug 2022 05:26) (106/60 - 149/70)  BP(mean): 79 (18 Aug 2022 00:22) (68 - 110)  RR: 19 (18 Aug 2022 05:26) (16 - 26)  SpO2: 95% (18 Aug 2022 05:26) (95% - 99%)    Parameters below as of 18 Aug 2022 05:26  Patient On (Oxygen Delivery Method): room air        CAPILLARY BLOOD GLUCOSE      POCT Blood Glucose.: 160 mg/dL (18 Aug 2022 12:23)  POCT Blood Glucose.: 152 mg/dL (18 Aug 2022 11:38)  POCT Blood Glucose.: 161 mg/dL (18 Aug 2022 08:02)  POCT Blood Glucose.: 137 mg/dL (17 Aug 2022 21:31)  POCT Blood Glucose.: 158 mg/dL (17 Aug 2022 18:31)      I&O's Detail    17 Aug 2022 07:01  -  18 Aug 2022 07:00  --------------------------------------------------------  IN:    Lactated Ringers: 1090 mL    Sodium Chloride 0.9% Bolus: 600 mL  Total IN: 1690 mL    OUT:    Indwelling Catheter - Urethral (mL): 1000 mL  Total OUT: 1000 mL    Total NET: 690 mL          MEDICATIONS  (STANDING):  acetaminophen     Tablet .. 650 milliGRAM(s) Oral once  aspirin enteric coated 81 milliGRAM(s) Oral daily  cefTRIAXone   IVPB 1000 milliGRAM(s) IV Intermittent every 24 hours  dextrose 5%. 1000 milliLiter(s) (50 mL/Hr) IV Continuous <Continuous>  dextrose 50% Injectable 25 Gram(s) IV Push once  enoxaparin Injectable 40 milliGRAM(s) SubCutaneous every 24 hours  gabapentin 300 milliGRAM(s) Oral three times a day  glucagon  Injectable 1 milliGRAM(s) IntraMuscular once  insulin lispro (ADMELOG) corrective regimen sliding scale   SubCutaneous three times a day before meals  insulin lispro (ADMELOG) corrective regimen sliding scale   SubCutaneous at bedtime  lactated ringers. 1000 milliLiter(s) (110 mL/Hr) IV Continuous <Continuous>  levothyroxine 100 MICROGram(s) Oral daily  losartan 50 milliGRAM(s) Oral daily  montelukast 10 milliGRAM(s) Oral daily  senna 2 Tablet(s) Oral at bedtime  sodium chloride 0.9% Bolus 1000 milliLiter(s) IV Bolus once    MEDICATIONS  (PRN):  acetaminophen     Tablet .. 650 milliGRAM(s) Oral every 6 hours PRN Temp greater or equal to 38C (100.4F), Mild Pain (1 - 3)  ALBUTerol    90 MICROgram(s) HFA Inhaler 2 Puff(s) Inhalation every 6 hours PRN Shortness of Breath and/or Wheezing  dextrose Oral Gel 15 Gram(s) Oral once PRN Blood Glucose LESS THAN 70 milliGRAM(s)/deciliter  HYDROmorphone  Injectable 0.5 milliGRAM(s) IV Push every 4 hours PRN Severe Pain (7 - 10)  ondansetron Injectable 4 milliGRAM(s) IV Push every 6 hours PRN Nausea  oxycodone    5 mG/acetaminophen 325 mG 1 Tablet(s) Oral every 6 hours PRN Moderate Pain (4 - 6)      Physical Exam:  Gen: NAD.  Lungs: Non labored breathing.   Ab: Soft, nontender, nondistended. Notable L CVAt  : Juares in place, pink tinged urine    Labs:    08-18    138  |  107  |  17  ----------------------------<  120<H>  3.5   |  22  |  1.20    Ca    9.0      18 Aug 2022 05:30    TPro  7.9  /  Alb  3.5  /  TBili  0.6  /  DBili  x   /  AST  31  /  ALT  34  /  AlkPhos  26<L>  08-17    LIVER FUNCTIONS - ( 17 Aug 2022 09:30 )  Alb: 3.5 g/dL / Pro: 7.9 g/dL / ALK PHOS: 26 U/L / ALT: 34 U/L DA / AST: 31 U/L / GGT: x                                 11.7   16.66 )-----------( 198      ( 18 Aug 2022 05:30 )             37.4     PT/INR - ( 17 Aug 2022 09:30 )   PT: 12.1 sec;   INR: 1.02 ratio         PTT - ( 17 Aug 2022 09:30 )  PTT:29.3 sec

## 2022-08-18 NOTE — PROGRESS NOTE ADULT - ASSESSMENT
76 y.o. F with sepsis secondary to UTI, L UPJ calculus obstruction s/p cysto, L stent insertion    -Consistent carb, DASH diet as tolerated  -IVF resuscitation  -Abx--> CTX  -f/u urine & blood cx--> Blood gram negative rods  -Pain control prn  -DVT ppx    RICKEY  -IVF resuscitation  -L stent drainage  -Trend Cr- imroved 1.2    DM  -ISS while inhouse    HTN  -con't Losartan    COPD  -con't nebulizer    Hypothyroidism  -con't Synthroid    Plan discussed with attending.

## 2022-08-19 LAB
ANION GAP SERPL CALC-SCNC: 9 MMOL/L — SIGNIFICANT CHANGE UP (ref 5–17)
BUN SERPL-MCNC: 12 MG/DL — SIGNIFICANT CHANGE UP (ref 7–18)
CALCIUM SERPL-MCNC: 8.4 MG/DL — SIGNIFICANT CHANGE UP (ref 8.4–10.5)
CHLORIDE SERPL-SCNC: 107 MMOL/L — SIGNIFICANT CHANGE UP (ref 96–108)
CO2 SERPL-SCNC: 24 MMOL/L — SIGNIFICANT CHANGE UP (ref 22–31)
CREAT SERPL-MCNC: 0.96 MG/DL — SIGNIFICANT CHANGE UP (ref 0.5–1.3)
EGFR: 62 ML/MIN/1.73M2 — SIGNIFICANT CHANGE UP
GLUCOSE SERPL-MCNC: 139 MG/DL — HIGH (ref 70–99)
HCT VFR BLD CALC: 30.1 % — LOW (ref 34.5–45)
HGB BLD-MCNC: 9.8 G/DL — LOW (ref 11.5–15.5)
MCHC RBC-ENTMCNC: 28.5 PG — SIGNIFICANT CHANGE UP (ref 27–34)
MCHC RBC-ENTMCNC: 32.6 GM/DL — SIGNIFICANT CHANGE UP (ref 32–36)
MCV RBC AUTO: 87.5 FL — SIGNIFICANT CHANGE UP (ref 80–100)
NRBC # BLD: 0 /100 WBCS — SIGNIFICANT CHANGE UP (ref 0–0)
PLATELET # BLD AUTO: 159 K/UL — SIGNIFICANT CHANGE UP (ref 150–400)
POTASSIUM SERPL-MCNC: 3.6 MMOL/L — SIGNIFICANT CHANGE UP (ref 3.5–5.3)
POTASSIUM SERPL-SCNC: 3.6 MMOL/L — SIGNIFICANT CHANGE UP (ref 3.5–5.3)
RBC # BLD: 3.44 M/UL — LOW (ref 3.8–5.2)
RBC # FLD: 13.2 % — SIGNIFICANT CHANGE UP (ref 10.3–14.5)
SODIUM SERPL-SCNC: 140 MMOL/L — SIGNIFICANT CHANGE UP (ref 135–145)
WBC # BLD: 12.56 K/UL — HIGH (ref 3.8–10.5)
WBC # FLD AUTO: 12.56 K/UL — HIGH (ref 3.8–10.5)

## 2022-08-19 PROCEDURE — 99222 1ST HOSP IP/OBS MODERATE 55: CPT

## 2022-08-19 PROCEDURE — 71045 X-RAY EXAM CHEST 1 VIEW: CPT | Mod: 26

## 2022-08-19 PROCEDURE — 99232 SBSQ HOSP IP/OBS MODERATE 35: CPT

## 2022-08-19 RX ORDER — SODIUM CHLORIDE 9 MG/ML
500 INJECTION, SOLUTION INTRAVENOUS ONCE
Refills: 0 | Status: COMPLETED | OUTPATIENT
Start: 2022-08-19 | End: 2022-08-19

## 2022-08-19 RX ORDER — ACETAMINOPHEN 500 MG
1000 TABLET ORAL ONCE
Refills: 0 | Status: COMPLETED | OUTPATIENT
Start: 2022-08-19 | End: 2022-08-19

## 2022-08-19 RX ADMIN — GABAPENTIN 300 MILLIGRAM(S): 400 CAPSULE ORAL at 15:49

## 2022-08-19 RX ADMIN — Medication 1000 MILLIGRAM(S): at 02:28

## 2022-08-19 RX ADMIN — CEFEPIME 100 MILLIGRAM(S): 1 INJECTION, POWDER, FOR SOLUTION INTRAMUSCULAR; INTRAVENOUS at 18:30

## 2022-08-19 RX ADMIN — GABAPENTIN 300 MILLIGRAM(S): 400 CAPSULE ORAL at 21:52

## 2022-08-19 RX ADMIN — CEFEPIME 100 MILLIGRAM(S): 1 INJECTION, POWDER, FOR SOLUTION INTRAMUSCULAR; INTRAVENOUS at 06:35

## 2022-08-19 RX ADMIN — SODIUM CHLORIDE 500 MILLILITER(S): 9 INJECTION, SOLUTION INTRAVENOUS at 01:51

## 2022-08-19 RX ADMIN — LOSARTAN POTASSIUM 50 MILLIGRAM(S): 100 TABLET, FILM COATED ORAL at 06:35

## 2022-08-19 RX ADMIN — Medication 100 MICROGRAM(S): at 06:36

## 2022-08-19 RX ADMIN — Medication 81 MILLIGRAM(S): at 15:09

## 2022-08-19 RX ADMIN — Medication 400 MILLIGRAM(S): at 01:29

## 2022-08-19 RX ADMIN — ENOXAPARIN SODIUM 40 MILLIGRAM(S): 100 INJECTION SUBCUTANEOUS at 06:36

## 2022-08-19 RX ADMIN — SODIUM CHLORIDE 110 MILLILITER(S): 9 INJECTION, SOLUTION INTRAVENOUS at 02:20

## 2022-08-19 RX ADMIN — MONTELUKAST 10 MILLIGRAM(S): 4 TABLET, CHEWABLE ORAL at 15:09

## 2022-08-19 RX ADMIN — GABAPENTIN 300 MILLIGRAM(S): 400 CAPSULE ORAL at 06:36

## 2022-08-19 RX ADMIN — SENNA PLUS 2 TABLET(S): 8.6 TABLET ORAL at 21:52

## 2022-08-19 NOTE — CONSULT NOTE ADULT - SUBJECTIVE AND OBJECTIVE BOX
PGY-1 Progress Note discussed with attending    PAGER #: [[phone]] TILL 5:00 PM  PLEASE CONTACT ON CALL TEAM:  - On Call Team (Please refer to Sarah) FROM 5:00 PM - 8:30PM  - Nightfloat Team FROM 8:30 -7:30 AM    CHIEF COMPLAINT & BRIEF HOSPITAL COURSE:    INTERVAL HPI/OVERNIGHT EVENTS:       REVIEW OF SYSTEMS:  CONSTITUTIONAL: No fever, weight loss, or fatigue  RESPIRATORY: No cough, wheezing, chills or hemoptysis; No shortness of breath  CARDIOVASCULAR: No chest pain, palpitations, dizziness, or leg swelling  GASTROINTESTINAL: No abdominal pain. No nausea, vomiting, or hematemesis; No diarrhea or constipation. No melena or hematochezia.  GENITOURINARY: No dysuria or hematuria, urinary frequency  NEUROLOGICAL: No headaches, memory loss, loss of strength, numbness, or tremors  SKIN: No itching, burning, rashes, or lesions     MEDICATIONS  (STANDING):  aspirin enteric coated 81 milliGRAM(s) Oral daily  cefepime   IVPB 2000 milliGRAM(s) IV Intermittent every 12 hours  dextrose 5%. 1000 milliLiter(s) (50 mL/Hr) IV Continuous <Continuous>  dextrose 50% Injectable 25 Gram(s) IV Push once  enoxaparin Injectable 40 milliGRAM(s) SubCutaneous every 24 hours  gabapentin 300 milliGRAM(s) Oral three times a day  glucagon  Injectable 1 milliGRAM(s) IntraMuscular once  insulin lispro (ADMELOG) corrective regimen sliding scale   SubCutaneous three times a day before meals  insulin lispro (ADMELOG) corrective regimen sliding scale   SubCutaneous at bedtime  levothyroxine 100 MICROGram(s) Oral daily  losartan 50 milliGRAM(s) Oral daily  montelukast 10 milliGRAM(s) Oral daily  senna 2 Tablet(s) Oral at bedtime    MEDICATIONS  (PRN):  ALBUTerol    90 MICROgram(s) HFA Inhaler 2 Puff(s) Inhalation every 6 hours PRN Shortness of Breath and/or Wheezing  dextrose Oral Gel 15 Gram(s) Oral once PRN Blood Glucose LESS THAN 70 milliGRAM(s)/deciliter  HYDROmorphone  Injectable 0.5 milliGRAM(s) IV Push every 4 hours PRN Severe Pain (7 - 10)  ondansetron Injectable 4 milliGRAM(s) IV Push every 6 hours PRN Nausea  oxycodone    5 mG/acetaminophen 325 mG 1 Tablet(s) Oral every 6 hours PRN Moderate Pain (4 - 6)      Vital Signs Last 24 Hrs  T(C): 37.6 (19 Aug 2022 07:54), Max: 39.3 (18 Aug 2022 17:48)  T(F): 99.7 (19 Aug 2022 07:54), Max: 102.7 (18 Aug 2022 17:48)  HR: 100 (19 Aug 2022 07:54) (98 - 133)  BP: 148/75 (19 Aug 2022 07:54) (115/65 - 148/75)  BP(mean): --  RR: 19 (19 Aug 2022 07:54) (18 - 22)  SpO2: 93% (19 Aug 2022 07:54) (93% - 100%)    Parameters below as of 19 Aug 2022 07:54  Patient On (Oxygen Delivery Method): room air        PHYSICAL EXAMINATION:  GENERAL: NAD, well built  HEAD:  Atraumatic, Normocephalic  EYES:  conjunctiva and sclera clear  NECK: Supple, No JVD, Normal thyroid  CHEST/LUNG: Clear to auscultation. Clear to percussion bilaterally; No rales, rhonchi, wheezing, or rubs  HEART: Regular rate and rhythm; No murmurs, rubs, or gallops  ABDOMEN: Soft, Nontender, Nondistended; Bowel sounds present  NERVOUS SYSTEM:  Alert & Oriented X3,    EXTREMITIES:  2+ Peripheral Pulses, No clubbing, cyanosis, or edema  SKIN: warm dry                          9.8    12.56 )-----------( 159      ( 19 Aug 2022 04:46 )             30.1     08-19    140  |  107  |  12  ----------------------------<  139<H>  3.6   |  24  |  0.96    Ca    8.4      19 Aug 2022 04:46                    CAPILLARY BLOOD GLUCOSE  CAPILLARY BLOOD GLUCOSE      POCT Blood Glucose.: 169 mg/dL (18 Aug 2022 21:01)    CAPILLARY BLOOD GLUCOSE      POCT Blood Glucose.: 169 mg/dL (18 Aug 2022 21:01)  POCT Blood Glucose.: 124 mg/dL (18 Aug 2022 16:27)  POCT Blood Glucose.: 160 mg/dL (18 Aug 2022 12:23)      RADIOLOGY & ADDITIONAL TESTS:                       CHIEF COMPLAINT & BRIEF HOSPITAL COURSE:  76 y.o. F with PMH L UPJ calculus s/p cysto, stent March 2021, HTN, DM, hypothyroid presents to [facility] ER c/o weakness this morning. Pt was attempting to get up to use the restroom when she fell backwards.     INTERVAL HPI/OVERNIGHT EVENTS:   CT abdomen was done and showed Large left proximal ureteral calculus causing moderate hydronephrosis s/p stent insertion 8/17 , urine and blood cultures came back positive . Patient was started on rocephin for UTI . Patient still spikes fever in AM along with tachypnea hence medicine is consulted       REVIEW OF SYSTEMS:  Cannot be fully assessed given mental status     MEDICATIONS  (STANDING):  aspirin enteric coated 81 milliGRAM(s) Oral daily  cefepime   IVPB 2000 milliGRAM(s) IV Intermittent every 12 hours  dextrose 5%. 1000 milliLiter(s) (50 mL/Hr) IV Continuous <Continuous>  dextrose 50% Injectable 25 Gram(s) IV Push once  enoxaparin Injectable 40 milliGRAM(s) SubCutaneous every 24 hours  gabapentin 300 milliGRAM(s) Oral three times a day  glucagon  Injectable 1 milliGRAM(s) IntraMuscular once  insulin lispro (ADMELOG) corrective regimen sliding scale   SubCutaneous three times a day before meals  insulin lispro (ADMELOG) corrective regimen sliding scale   SubCutaneous at bedtime  levothyroxine 100 MICROGram(s) Oral daily  losartan 50 milliGRAM(s) Oral daily  montelukast 10 milliGRAM(s) Oral daily  senna 2 Tablet(s) Oral at bedtime    MEDICATIONS  (PRN):  ALBUTerol    90 MICROgram(s) HFA Inhaler 2 Puff(s) Inhalation every 6 hours PRN Shortness of Breath and/or Wheezing  dextrose Oral Gel 15 Gram(s) Oral once PRN Blood Glucose LESS THAN 70 milliGRAM(s)/deciliter  HYDROmorphone  Injectable 0.5 milliGRAM(s) IV Push every 4 hours PRN Severe Pain (7 - 10)  ondansetron Injectable 4 milliGRAM(s) IV Push every 6 hours PRN Nausea  oxycodone    5 mG/acetaminophen 325 mG 1 Tablet(s) Oral every 6 hours PRN Moderate Pain (4 - 6)      Vital Signs Last 24 Hrs  T(C): 37.6 (19 Aug 2022 07:54), Max: 39.3 (18 Aug 2022 17:48)  T(F): 99.7 (19 Aug 2022 07:54), Max: 102.7 (18 Aug 2022 17:48)  HR: 100 (19 Aug 2022 07:54) (98 - 133)  BP: 148/75 (19 Aug 2022 07:54) (115/65 - 148/75)  BP(mean): --  RR: 19 (19 Aug 2022 07:54) (18 - 22)  SpO2: 93% (19 Aug 2022 07:54) (93% - 100%)    Parameters below as of 19 Aug 2022 07:54  Patient On (Oxygen Delivery Method): room air        PHYSICAL EXAMINATION:  GENERAL: NAD, on NC   HEAD:  Atraumatic, Normocephalic  EYES:  conjunctiva and sclera clear  NECK: Supple, No JVD, Normal thyroid  CHEST/LUNG: Clear to auscultation. Clear to percussion bilaterally; No rales, rhonchi, wheezing, or rubs  HEART: Regular rate and rhythm; No murmurs, rubs, or gallops  ABDOMEN: Soft, Nontender, Nondistended; Bowel sounds present with segura inserted   NERVOUS SYSTEM:  Alert & Oriented X2,    EXTREMITIES:  2+ Peripheral Pulses, No clubbing, cyanosis, or edema  SKIN: warm dry                          9.8    12.56 )-----------( 159      ( 19 Aug 2022 04:46 )             30.1     08-19    140  |  107  |  12  ----------------------------<  139<H>  3.6   |  24  |  0.96    Ca    8.4      19 Aug 2022 04:46                    CAPILLARY BLOOD GLUCOSE  CAPILLARY BLOOD GLUCOSE      POCT Blood Glucose.: 169 mg/dL (18 Aug 2022 21:01)    CAPILLARY BLOOD GLUCOSE      POCT Blood Glucose.: 169 mg/dL (18 Aug 2022 21:01)  POCT Blood Glucose.: 124 mg/dL (18 Aug 2022 16:27)  POCT Blood Glucose.: 160 mg/dL (18 Aug 2022 12:23)      RADIOLOGY & ADDITIONAL TESTS:                       CHIEF COMPLAINT & BRIEF HOSPITAL COURSE:  76 y.o. F with PMH L UPJ calculus s/p cysto, stent March 2021, HTN, DM, hypothyroid presents to [facility] ER c/o weakness this morning. Pt was attempting to get up to use the restroom when she fell backwards.   CT abdomen was done and showed Large left proximal ureteral calculus causing moderate hydronephrosis s/p stent insertion 8/17 , urine and blood cultures came back positive . Patient was started on rocephin for UTI . Patient still spikes fever in AM along with tachypnea hence medicine is consulted.       REVIEW OF SYSTEMS:  Cannot be fully assessed given mental status     MEDICATIONS  (STANDING):  aspirin enteric coated 81 milliGRAM(s) Oral daily  cefepime   IVPB 2000 milliGRAM(s) IV Intermittent every 12 hours  dextrose 5%. 1000 milliLiter(s) (50 mL/Hr) IV Continuous <Continuous>  dextrose 50% Injectable 25 Gram(s) IV Push once  enoxaparin Injectable 40 milliGRAM(s) SubCutaneous every 24 hours  gabapentin 300 milliGRAM(s) Oral three times a day  glucagon  Injectable 1 milliGRAM(s) IntraMuscular once  insulin lispro (ADMELOG) corrective regimen sliding scale   SubCutaneous three times a day before meals  insulin lispro (ADMELOG) corrective regimen sliding scale   SubCutaneous at bedtime  levothyroxine 100 MICROGram(s) Oral daily  losartan 50 milliGRAM(s) Oral daily  montelukast 10 milliGRAM(s) Oral daily  senna 2 Tablet(s) Oral at bedtime    MEDICATIONS  (PRN):  ALBUTerol    90 MICROgram(s) HFA Inhaler 2 Puff(s) Inhalation every 6 hours PRN Shortness of Breath and/or Wheezing  dextrose Oral Gel 15 Gram(s) Oral once PRN Blood Glucose LESS THAN 70 milliGRAM(s)/deciliter  HYDROmorphone  Injectable 0.5 milliGRAM(s) IV Push every 4 hours PRN Severe Pain (7 - 10)  ondansetron Injectable 4 milliGRAM(s) IV Push every 6 hours PRN Nausea  oxycodone    5 mG/acetaminophen 325 mG 1 Tablet(s) Oral every 6 hours PRN Moderate Pain (4 - 6)      Vital Signs Last 24 Hrs  T(C): 37.6 (19 Aug 2022 07:54), Max: 39.3 (18 Aug 2022 17:48)  T(F): 99.7 (19 Aug 2022 07:54), Max: 102.7 (18 Aug 2022 17:48)  HR: 100 (19 Aug 2022 07:54) (98 - 133)  BP: 148/75 (19 Aug 2022 07:54) (115/65 - 148/75)  BP(mean): --  RR: 19 (19 Aug 2022 07:54) (18 - 22)  SpO2: 93% (19 Aug 2022 07:54) (93% - 100%)    Parameters below as of 19 Aug 2022 07:54  Patient On (Oxygen Delivery Method): room air        PHYSICAL EXAMINATION:  GENERAL: NAD, on NC   HEAD:  Atraumatic, Normocephalic  EYES:  conjunctiva and sclera clear  NECK: Supple, No JVD, Normal thyroid  CHEST/LUNG: Clear to auscultation. Clear to percussion bilaterally; No rales, rhonchi, wheezing, or rubs  HEART: Regular rate and rhythm; No murmurs, rubs, or gallops  ABDOMEN: Soft, Nontender, Nondistended; Bowel sounds present with segura inserted   NERVOUS SYSTEM:  Alert & Oriented X2,    EXTREMITIES:  2+ Peripheral Pulses, No clubbing, cyanosis, or edema  SKIN: warm dry                          9.8    12.56 )-----------( 159      ( 19 Aug 2022 04:46 )             30.1     08-19    140  |  107  |  12  ----------------------------<  139<H>  3.6   |  24  |  0.96    Ca    8.4      19 Aug 2022 04:46                    CAPILLARY BLOOD GLUCOSE  CAPILLARY BLOOD GLUCOSE      POCT Blood Glucose.: 169 mg/dL (18 Aug 2022 21:01)    CAPILLARY BLOOD GLUCOSE      POCT Blood Glucose.: 169 mg/dL (18 Aug 2022 21:01)  POCT Blood Glucose.: 124 mg/dL (18 Aug 2022 16:27)  POCT Blood Glucose.: 160 mg/dL (18 Aug 2022 12:23)      RADIOLOGY & ADDITIONAL TESTS:

## 2022-08-19 NOTE — PROGRESS NOTE ADULT - SUBJECTIVE AND OBJECTIVE BOX
Surgery Progress Note     Subjective   Patient seen and examined.   Fever x 2 in past 24hrs. Tachycardic early this morning but now resolved.   Pt c/o not feeling well. But denies pain      Vital Signs:  Vital Signs Last 24 Hrs  T(C): 37.9 (18 Aug 2022 05:26), Max: 38.6 (18 Aug 2022 00:22)  ICU Vital Signs Last 24 Hrs  T(C): 37.6 (19 Aug 2022 07:54), Max: 39.3 (18 Aug 2022 17:48)  T(F): 99.7 (19 Aug 2022 07:54), Max: 102.7 (18 Aug 2022 17:48)  HR: 100 (19 Aug 2022 07:54) (98 - 133)  BP: 148/75 (19 Aug 2022 07:54) (115/65 - 148/75)  BP(mean): --  ABP: --  ABP(mean): --  RR: 19 (19 Aug 2022 07:54) (18 - 22)  SpO2: 93% (19 Aug 2022 07:54) (93% - 100%)    O2 Parameters below as of 19 Aug 2022 07:54  Patient On (Oxygen Delivery Method): room air      I&O's Detail    18 Aug 2022 07:01  -  19 Aug 2022 07:00  --------------------------------------------------------  IN:    Lactated Ringers: 1000 mL    Sodium Chloride 0.9% Bolus: 1000 mL  Total IN: 2000 mL    OUT:    Indwelling Catheter - Urethral (mL): 3400 mL  Total OUT: 3400 mL    Total NET: -1400 mL      MEDICATIONS  (STANDING):  aspirin enteric coated 81 milliGRAM(s) Oral daily  cefepime   IVPB 2000 milliGRAM(s) IV Intermittent every 12 hours  cefTRIAXone   IVPB 1000 milliGRAM(s) IV Intermittent every 24 hours  dextrose 5%. 1000 milliLiter(s) (50 mL/Hr) IV Continuous <Continuous>  dextrose 50% Injectable 25 Gram(s) IV Push once  enoxaparin Injectable 40 milliGRAM(s) SubCutaneous every 24 hours  gabapentin 300 milliGRAM(s) Oral three times a day  glucagon  Injectable 1 milliGRAM(s) IntraMuscular once  insulin lispro (ADMELOG) corrective regimen sliding scale   SubCutaneous three times a day before meals  insulin lispro (ADMELOG) corrective regimen sliding scale   SubCutaneous at bedtime  lactated ringers. 1000 milliLiter(s) (110 mL/Hr) IV Continuous <Continuous>  levothyroxine 100 MICROGram(s) Oral daily  losartan 50 milliGRAM(s) Oral daily  montelukast 10 milliGRAM(s) Oral daily  senna 2 Tablet(s) Oral at bedtime    MEDICATIONS  (PRN):  ALBUTerol    90 MICROgram(s) HFA Inhaler 2 Puff(s) Inhalation every 6 hours PRN Shortness of Breath and/or Wheezing  dextrose Oral Gel 15 Gram(s) Oral once PRN Blood Glucose LESS THAN 70 milliGRAM(s)/deciliter  HYDROmorphone  Injectable 0.5 milliGRAM(s) IV Push every 4 hours PRN Severe Pain (7 - 10)  ondansetron Injectable 4 milliGRAM(s) IV Push every 6 hours PRN Nausea  oxycodone    5 mG/acetaminophen 325 mG 1 Tablet(s) Oral every 6 hours PRN Moderate Pain (4 - 6)      Physical Exam:  Gen: NAD  Chest: rapid breathing.   Ab: Soft, nontender, nondistended. NO CVA tenderness  : Juares in place, clear urine with sediment    Labs:                          9.8    12.56 )-----------( 159      ( 19 Aug 2022 04:46 )             30.1     08-19    140  |  107  |  12  ----------------------------<  139<H>  3.6   |  24  |  0.96    Ca    8.4      19 Aug 2022 04:46    TPro  7.9  /  Alb  3.5  /  TBili  0.6  /  DBili  x   /  AST  31  /  ALT  34  /  AlkPhos  26<L>  08-17    Cultures:  Culture - Blood (08.17.22 @ 10:50)   - Proteus species: Detec   Gram Stain:   Growth in anaerobic bottle: Gram Negative Rods   Growth in aerobic bottle: Gram Negative Rods

## 2022-08-19 NOTE — PROGRESS NOTE ADULT - ASSESSMENT
76 y.o. F with sepsis secondary to UTI, L UPJ calculus obstruction s/p cysto, L stent insertion 8/17  Fever x 2 in 24 hrs  Bacteremia - GNR  Leukocytosis improving  Tachypnea but BP controlled    -Consistent carb, DASH diet as tolerated  - Reduce IVF  - Antibiotics changed to Cefepime  -Pain control prn  -DVT ppx  -Medicine consult    RICKEY  -resolved    DM  -ISS while inhouse    HTN  -con't Losartan    COPD  -con't nebulizer    Hypothyroidism  -con't Synthroid    Plan discussed with attending.   76 y.o. F with sepsis secondary to UTI, L UPJ calculus obstruction s/p cysto, L stent insertion 8/17  Fever x 2 in 24 hrs  Bacteremia - GNR  Leukocytosis improving  Tachypnea but BP controlled, ordered stat chest xray.    -Consistent carb, DASH diet as tolerated  - Reduce IVF  - Antibiotics changed to Cefepime  -Pain control prn  -DVT ppx  -Medicine consult    RICKEY  -resolved    DM  -ISS while inhouse    HTN  -con't Losartan    COPD  -con't nebulizer    Hypothyroidism  -con't Synthroid    Plan discussed with attending.

## 2022-08-19 NOTE — CONSULT NOTE ADULT - ASSESSMENT
76 y.o. F with PMH L UPJ calculus s/p cysto, stent March 2021, HTN, DM, hypothyroid presents to [facility] ER c/o weakness CT abdomen was done and showed Large left proximal ureteral calculus causing moderate hydronephrosis s/p stent insertion 8/17 , urine and blood cultures came back positive . Patient was started on rocephin for UTI . Patient still spikes fever in AM along with tachypnea hence medicine is consulted .    Sepsis 2/2 UTI   - would recommend to broaden the antibiotic coverage to neropenem    76 y.o. F with PMH L UPJ calculus s/p cysto, stent March 2021, HTN, DM, hypothyroid presents to [facility] ER c/o weakness CT abdomen was done and showed Large left proximal ureteral calculus causing moderate hydronephrosis s/p stent insertion 8/17 , urine and blood cultures came back positive . Patient was started on rocephin for UTI . Patient still spikes fever in AM along with tachypnea hence medicine is consulted and antibiotics changed to cefepime.     Sepsis 2/2 UTI   -s/p stent insertion 8/17   - would recommend to broaden the antibiotic coverage to meropenem until sensitivities come back   - Would recommend to consult ID  - F/u blood and urine cultures sensitivity      AHRF   - Patient with increase oxygen requirement in AM  - Would recommend chest xray   - Monitor respiratory status.     DM  -ISS while inhouse    HTN  -cw Losartan    Hypothyroidism  -cw Synthroid

## 2022-08-19 NOTE — CONSULT NOTE ADULT - ATTENDING COMMENTS
Patient is a 76 y.o. F with PMH L UPJ calculus s/p cysto, stent March 2021, HTN, DM, hypothyroid presents to [facility] ER c/o weakness, found to have a  Large left proximal ureteral calculus causing moderate hydronephrosis. Admitted for Urosepsis 2/2 obstructive uropathy s/p stent placement on 8/17. Hospital course c/b GNR bacteremia, continues to spike fevers on IV abx, hence medicine consulted.     Pt was seen and examined, was sleeping this afternoon but arouseable.    Labs reviewed    PE as above     A/P:  #Urosepsis 2/ obstructive uropathy s/p stent placement   #Bacteremia   #Acute metabolic encephalopathy- infectious etiology   #Hypertension  #Hypothyroidism  #DM  #RICKEY- resolved     Plan:  -Pt met sepsis criteria on admission (WBC>17k, temp 102 and UTI as suspected source), s/p stent placement. urine cx- Proteus, Blood cx Proteus - pending sensitivities.   -Would advise to switch abx to meropenem to cover for ESBL, though patient's prior cx have been pansensitive. Would de-escalate abx once cx sensitivities available. Follow repeat blood cx, ESR, CRP . Can consider ID eval   -BP stable, c/w losartan   -C/w levothyroxine   -Follow chest X-ray. Per chart review, patient was tachypneic this am. Though appears comfortable at present.

## 2022-08-20 LAB
-  AMIKACIN: SIGNIFICANT CHANGE UP
-  AMIKACIN: SIGNIFICANT CHANGE UP
-  AMOXICILLIN/CLAVULANIC ACID: SIGNIFICANT CHANGE UP
-  AMPICILLIN/SULBACTAM: SIGNIFICANT CHANGE UP
-  AMPICILLIN/SULBACTAM: SIGNIFICANT CHANGE UP
-  AMPICILLIN: SIGNIFICANT CHANGE UP
-  AMPICILLIN: SIGNIFICANT CHANGE UP
-  AZTREONAM: SIGNIFICANT CHANGE UP
-  AZTREONAM: SIGNIFICANT CHANGE UP
-  CEFAZOLIN: SIGNIFICANT CHANGE UP
-  CEFAZOLIN: SIGNIFICANT CHANGE UP
-  CEFEPIME: SIGNIFICANT CHANGE UP
-  CEFEPIME: SIGNIFICANT CHANGE UP
-  CEFOXITIN: SIGNIFICANT CHANGE UP
-  CEFOXITIN: SIGNIFICANT CHANGE UP
-  CEFTRIAXONE: SIGNIFICANT CHANGE UP
-  CEFTRIAXONE: SIGNIFICANT CHANGE UP
-  CIPROFLOXACIN: SIGNIFICANT CHANGE UP
-  CIPROFLOXACIN: SIGNIFICANT CHANGE UP
-  ERTAPENEM: SIGNIFICANT CHANGE UP
-  ERTAPENEM: SIGNIFICANT CHANGE UP
-  GENTAMICIN: SIGNIFICANT CHANGE UP
-  GENTAMICIN: SIGNIFICANT CHANGE UP
-  LEVOFLOXACIN: SIGNIFICANT CHANGE UP
-  LEVOFLOXACIN: SIGNIFICANT CHANGE UP
-  MEROPENEM: SIGNIFICANT CHANGE UP
-  MEROPENEM: SIGNIFICANT CHANGE UP
-  NITROFURANTOIN: SIGNIFICANT CHANGE UP
-  PIPERACILLIN/TAZOBACTAM: SIGNIFICANT CHANGE UP
-  PIPERACILLIN/TAZOBACTAM: SIGNIFICANT CHANGE UP
-  TOBRAMYCIN: SIGNIFICANT CHANGE UP
-  TOBRAMYCIN: SIGNIFICANT CHANGE UP
-  TRIMETHOPRIM/SULFAMETHOXAZOLE: SIGNIFICANT CHANGE UP
-  TRIMETHOPRIM/SULFAMETHOXAZOLE: SIGNIFICANT CHANGE UP
ANION GAP SERPL CALC-SCNC: 11 MMOL/L — SIGNIFICANT CHANGE UP (ref 5–17)
BUN SERPL-MCNC: 13 MG/DL — SIGNIFICANT CHANGE UP (ref 7–18)
CALCIUM SERPL-MCNC: 8.7 MG/DL — SIGNIFICANT CHANGE UP (ref 8.4–10.5)
CHLORIDE SERPL-SCNC: 101 MMOL/L — SIGNIFICANT CHANGE UP (ref 96–108)
CO2 SERPL-SCNC: 24 MMOL/L — SIGNIFICANT CHANGE UP (ref 22–31)
CREAT SERPL-MCNC: 0.73 MG/DL — SIGNIFICANT CHANGE UP (ref 0.5–1.3)
CULTURE RESULTS: SIGNIFICANT CHANGE UP
EGFR: 86 ML/MIN/1.73M2 — SIGNIFICANT CHANGE UP
GLUCOSE BLDC GLUCOMTR-MCNC: 134 MG/DL — HIGH (ref 70–99)
GLUCOSE BLDC GLUCOMTR-MCNC: 147 MG/DL — HIGH (ref 70–99)
GLUCOSE SERPL-MCNC: 200 MG/DL — HIGH (ref 70–99)
HCT VFR BLD CALC: 30.3 % — LOW (ref 34.5–45)
HGB BLD-MCNC: 9.8 G/DL — LOW (ref 11.5–15.5)
MCHC RBC-ENTMCNC: 28 PG — SIGNIFICANT CHANGE UP (ref 27–34)
MCHC RBC-ENTMCNC: 32.3 GM/DL — SIGNIFICANT CHANGE UP (ref 32–36)
MCV RBC AUTO: 86.6 FL — SIGNIFICANT CHANGE UP (ref 80–100)
METHOD TYPE: SIGNIFICANT CHANGE UP
METHOD TYPE: SIGNIFICANT CHANGE UP
NRBC # BLD: 0 /100 WBCS — SIGNIFICANT CHANGE UP (ref 0–0)
ORGANISM # SPEC MICROSCOPIC CNT: SIGNIFICANT CHANGE UP
PLATELET # BLD AUTO: 174 K/UL — SIGNIFICANT CHANGE UP (ref 150–400)
POTASSIUM SERPL-MCNC: 2.9 MMOL/L — CRITICAL LOW (ref 3.5–5.3)
POTASSIUM SERPL-SCNC: 2.9 MMOL/L — CRITICAL LOW (ref 3.5–5.3)
RBC # BLD: 3.5 M/UL — LOW (ref 3.8–5.2)
RBC # FLD: 13 % — SIGNIFICANT CHANGE UP (ref 10.3–14.5)
SODIUM SERPL-SCNC: 136 MMOL/L — SIGNIFICANT CHANGE UP (ref 135–145)
SPECIMEN SOURCE: SIGNIFICANT CHANGE UP
WBC # BLD: 9.86 K/UL — SIGNIFICANT CHANGE UP (ref 3.8–10.5)
WBC # FLD AUTO: 9.86 K/UL — SIGNIFICANT CHANGE UP (ref 3.8–10.5)

## 2022-08-20 PROCEDURE — 99233 SBSQ HOSP IP/OBS HIGH 50: CPT

## 2022-08-20 PROCEDURE — 99232 SBSQ HOSP IP/OBS MODERATE 35: CPT

## 2022-08-20 RX ORDER — LACTULOSE 10 G/15ML
10 SOLUTION ORAL ONCE
Refills: 0 | Status: COMPLETED | OUTPATIENT
Start: 2022-08-20 | End: 2022-08-20

## 2022-08-20 RX ORDER — POTASSIUM CHLORIDE 20 MEQ
10 PACKET (EA) ORAL
Refills: 0 | Status: COMPLETED | OUTPATIENT
Start: 2022-08-20 | End: 2022-08-20

## 2022-08-20 RX ORDER — CEFTRIAXONE 500 MG/1
2000 INJECTION, POWDER, FOR SOLUTION INTRAMUSCULAR; INTRAVENOUS EVERY 24 HOURS
Refills: 0 | Status: DISCONTINUED | OUTPATIENT
Start: 2022-08-20 | End: 2022-08-24

## 2022-08-20 RX ORDER — DEXTROSE MONOHYDRATE, SODIUM CHLORIDE, AND POTASSIUM CHLORIDE 50; .745; 4.5 G/1000ML; G/1000ML; G/1000ML
1000 INJECTION, SOLUTION INTRAVENOUS
Refills: 0 | Status: DISCONTINUED | OUTPATIENT
Start: 2022-08-20 | End: 2022-08-21

## 2022-08-20 RX ADMIN — Medication 100 MICROGRAM(S): at 05:26

## 2022-08-20 RX ADMIN — GABAPENTIN 300 MILLIGRAM(S): 400 CAPSULE ORAL at 05:30

## 2022-08-20 RX ADMIN — ENOXAPARIN SODIUM 40 MILLIGRAM(S): 100 INJECTION SUBCUTANEOUS at 05:26

## 2022-08-20 RX ADMIN — CEFEPIME 100 MILLIGRAM(S): 1 INJECTION, POWDER, FOR SOLUTION INTRAMUSCULAR; INTRAVENOUS at 05:26

## 2022-08-20 RX ADMIN — Medication 4: at 11:40

## 2022-08-20 RX ADMIN — GABAPENTIN 300 MILLIGRAM(S): 400 CAPSULE ORAL at 13:10

## 2022-08-20 RX ADMIN — MONTELUKAST 10 MILLIGRAM(S): 4 TABLET, CHEWABLE ORAL at 11:41

## 2022-08-20 RX ADMIN — Medication 100 MILLIEQUIVALENT(S): at 14:10

## 2022-08-20 RX ADMIN — Medication 81 MILLIGRAM(S): at 11:41

## 2022-08-20 RX ADMIN — Medication 100 MILLIEQUIVALENT(S): at 13:50

## 2022-08-20 RX ADMIN — SENNA PLUS 2 TABLET(S): 8.6 TABLET ORAL at 21:40

## 2022-08-20 RX ADMIN — LOSARTAN POTASSIUM 50 MILLIGRAM(S): 100 TABLET, FILM COATED ORAL at 05:26

## 2022-08-20 RX ADMIN — CEFTRIAXONE 100 MILLIGRAM(S): 500 INJECTION, POWDER, FOR SOLUTION INTRAMUSCULAR; INTRAVENOUS at 17:10

## 2022-08-20 RX ADMIN — LACTULOSE 10 GRAM(S): 10 SOLUTION ORAL at 16:13

## 2022-08-20 RX ADMIN — Medication 100 MILLIEQUIVALENT(S): at 16:08

## 2022-08-20 RX ADMIN — GABAPENTIN 300 MILLIGRAM(S): 400 CAPSULE ORAL at 21:40

## 2022-08-20 RX ADMIN — DEXTROSE MONOHYDRATE, SODIUM CHLORIDE, AND POTASSIUM CHLORIDE 50 MILLILITER(S): 50; .745; 4.5 INJECTION, SOLUTION INTRAVENOUS at 21:43

## 2022-08-20 NOTE — CONSULT NOTE ADULT - ASSESSMENT
Left sided Pyelonephritis  Bacteremia  Leukocytosis - normalized  Fever - low grade x 1 time      Plan - Cont Rocephin 2 gms iv q24hrs  repeat blood cultures.

## 2022-08-20 NOTE — PROGRESS NOTE ADULT - SUBJECTIVE AND OBJECTIVE BOX
INTERVAL HPI/OVERNIGHT EVENTS:    No acute events overnight.   denies f/c  no dysuria      MEDICATIONS  (STANDING):  aspirin enteric coated 81 milliGRAM(s) Oral daily  cefepime   IVPB 2000 milliGRAM(s) IV Intermittent every 12 hours  dextrose 5%. 1000 milliLiter(s) (50 mL/Hr) IV Continuous <Continuous>  dextrose 50% Injectable 25 Gram(s) IV Push once  enoxaparin Injectable 40 milliGRAM(s) SubCutaneous every 24 hours  gabapentin 300 milliGRAM(s) Oral three times a day  glucagon  Injectable 1 milliGRAM(s) IntraMuscular once  insulin lispro (ADMELOG) corrective regimen sliding scale   SubCutaneous three times a day before meals  insulin lispro (ADMELOG) corrective regimen sliding scale   SubCutaneous at bedtime  levothyroxine 100 MICROGram(s) Oral daily  losartan 50 milliGRAM(s) Oral daily  montelukast 10 milliGRAM(s) Oral daily  senna 2 Tablet(s) Oral at bedtime  sodium chloride 0.45% with potassium chloride 20 mEq/L 1000 milliLiter(s) (50 mL/Hr) IV Continuous <Continuous>    MEDICATIONS  (PRN):  ALBUTerol    90 MICROgram(s) HFA Inhaler 2 Puff(s) Inhalation every 6 hours PRN Shortness of Breath and/or Wheezing  dextrose Oral Gel 15 Gram(s) Oral once PRN Blood Glucose LESS THAN 70 milliGRAM(s)/deciliter  HYDROmorphone  Injectable 0.5 milliGRAM(s) IV Push every 4 hours PRN Severe Pain (7 - 10)  ondansetron Injectable 4 milliGRAM(s) IV Push every 6 hours PRN Nausea  oxycodone    5 mG/acetaminophen 325 mG 1 Tablet(s) Oral every 6 hours PRN Moderate Pain (4 - 6)      Vital Signs Last 24 Hrs  T(C): 37.8 (20 Aug 2022 05:25), Max: 37.8 (20 Aug 2022 05:25)  T(F): 100.1 (20 Aug 2022 05:25), Max: 100.1 (20 Aug 2022 05:25)  HR: 91 (20 Aug 2022 05:25) (83 - 91)  BP: 161/81 (20 Aug 2022 05:25) (129/60 - 161/81)  BP(mean): --  RR: 19 (20 Aug 2022 05:25) (17 - 19)  SpO2: 98% (20 Aug 2022 05:25) (97% - 99%)    Parameters below as of 20 Aug 2022 05:25  Patient On (Oxygen Delivery Method): nasal cannula  O2 Flow (L/min): 2        PHYSICAL EXAM  General: Alert and oriented, not in acute distress  Resp: Breathing unlabored  Abdomen: Soft, nondistended, nontender  : segura yellow slightly cloudy  Extremities: No pedal edema        I&O's Detail    19 Aug 2022 07:01  -  20 Aug 2022 07:00  --------------------------------------------------------  IN:  Total IN: 0 mL    OUT:    Indwelling Catheter - Urethral (mL): 2700 mL  Total OUT: 2700 mL    Total NET: -2700 mL          LABS:                        9.8    12.56 )-----------( 159      ( 19 Aug 2022 04:46 )             30.1             08-19    140  |  107  |  12  ----------------------------<  139<H>  3.6   |  24  |  0.96    Ca    8.4      19 Aug 2022 04:46

## 2022-08-20 NOTE — PROGRESS NOTE ADULT - ASSESSMENT
Patient is a 76 y.o. F with PMH L UPJ calculus s/p cysto, stent March 2021, HTN, DM, hypothyroid presents to [facility] ER c/o weakness, found to have a  Large left proximal ureteral calculus causing moderate hydronephrosis. Admitted for Urosepsis 2/2 obstructive uropathy s/p stent placement on 8/17. Hospital course c/b GNR bacteremia, now transferred to medicine service.       A/P:  #Urosepsis 2/ obstructive uropathy s/p stent placement   #Bacteremia   #Acute metabolic encephalopathy- infectious etiology   #Hypertension  #Hypothyroidism  #DM  #RICKEY- resolved     Plan:  -Pt met sepsis criteria on admission (WBC>17k, temp 102 and UTI as suspected source), s/p stent placement. urine cx- Proteus, Blood cx Proteus - pansensitive.    -Would switch to IV Rocephin based on sensitivities. Follow repeat blood cx . ID consult   -Pt appears fatigue but able to converse and has improved since yesterday.   -BP stable, c/w losartan   -C/w levothyroxine

## 2022-08-20 NOTE — PROGRESS NOTE ADULT - SUBJECTIVE AND OBJECTIVE BOX
Patient is a 76y old  Female who presents with a chief complaint of Sepsis secondary to UTI, Left ureteral calculus (20 Aug 2022 09:59)      INTERVAL HPI/OVERNIGHT EVENTS:     MEDICATIONS  (STANDING):  aspirin enteric coated 81 milliGRAM(s) Oral daily  cefTRIAXone   IVPB 2000 milliGRAM(s) IV Intermittent every 24 hours  dextrose 5%. 1000 milliLiter(s) (50 mL/Hr) IV Continuous <Continuous>  dextrose 50% Injectable 25 Gram(s) IV Push once  enoxaparin Injectable 40 milliGRAM(s) SubCutaneous every 24 hours  gabapentin 300 milliGRAM(s) Oral three times a day  glucagon  Injectable 1 milliGRAM(s) IntraMuscular once  insulin lispro (ADMELOG) corrective regimen sliding scale   SubCutaneous at bedtime  insulin lispro (ADMELOG) corrective regimen sliding scale   SubCutaneous three times a day before meals  levothyroxine 100 MICROGram(s) Oral daily  losartan 50 milliGRAM(s) Oral daily  montelukast 10 milliGRAM(s) Oral daily  senna 2 Tablet(s) Oral at bedtime  sodium chloride 0.45% with potassium chloride 20 mEq/L 1000 milliLiter(s) (50 mL/Hr) IV Continuous <Continuous>    MEDICATIONS  (PRN):  ALBUTerol    90 MICROgram(s) HFA Inhaler 2 Puff(s) Inhalation every 6 hours PRN Shortness of Breath and/or Wheezing  dextrose Oral Gel 15 Gram(s) Oral once PRN Blood Glucose LESS THAN 70 milliGRAM(s)/deciliter  ondansetron Injectable 4 milliGRAM(s) IV Push every 6 hours PRN Nausea      __________________________________________________  REVIEW OF SYSTEMS:    CONSTITUTIONAL: No fever,   EYES: no acute visual disturbances  NECK: No pain or stiffness  RESPIRATORY: No cough; No shortness of breath  CARDIOVASCULAR: No chest pain, no palpitations  GASTROINTESTINAL: No pain. No nausea or vomiting; No diarrhea   NEUROLOGICAL: No headache or numbness, no tremors  MUSCULOSKELETAL: No joint pain, no muscle pain  GENITOURINARY: no dysuria, no frequency, no hesitancy  PSYCHIATRY: no depression , no anxiety  ALL OTHER  ROS negative        Vital Signs Last 24 Hrs  T(C): 36.7 (20 Aug 2022 13:57), Max: 37.8 (20 Aug 2022 05:25)  T(F): 98.1 (20 Aug 2022 13:57), Max: 100.1 (20 Aug 2022 05:25)  HR: 83 (20 Aug 2022 13:57) (83 - 91)  BP: 164/83 (20 Aug 2022 13:57) (145/76 - 164/83)  BP(mean): --  RR: 18 (20 Aug 2022 13:57) (17 - 19)  SpO2: 95% (20 Aug 2022 13:57) (95% - 99%)    Parameters below as of 20 Aug 2022 13:57  Patient On (Oxygen Delivery Method): room air        ________________________________________________  PHYSICAL EXAM:  GENERAL: NAD  HEENT: Normocephalic;  conjunctivae and sclerae clear; moist mucous membranes;   NECK : supple  CHEST/LUNG: Clear to auscultation bilaterally with good air entry   HEART: S1 S2  regular; no murmurs, gallops or rubs  ABDOMEN: Soft, Nontender, Nondistended; Bowel sounds present  EXTREMITIES: no cyanosis; no edema; no calf tenderness  SKIN: warm and dry; no rash  NERVOUS SYSTEM:  Awake and alert; Oriented  to place, person and time ; no new deficits    _________________________________________________  LABS:                        9.8    9.86  )-----------( 174      ( 20 Aug 2022 10:14 )             30.3     08-20    136  |  101  |  13  ----------------------------<  200<H>  2.9<LL>   |  24  |  0.73    Ca    8.7      20 Aug 2022 10:14          CAPILLARY BLOOD GLUCOSE      POCT Blood Glucose.: 147 mg/dL (20 Aug 2022 16:06)  POCT Blood Glucose.: 203 mg/dL (20 Aug 2022 11:30)  POCT Blood Glucose.: 143 mg/dL (20 Aug 2022 07:50)  POCT Blood Glucose.: 138 mg/dL (19 Aug 2022 21:56)        RADIOLOGY & ADDITIONAL TESTS:    Imaging Personally Reviewed:  YES    Consultant(s) Notes Reviewed:   YES    Care Discussed with Consultants : YES     Plan of care was discussed with patient and /or primary care giver; all questions and concerns were addressed and care was aligned with patient's wishes.         Patient is a 76y old  Female who presents with a chief complaint of Sepsis secondary to UTI, Left ureteral calculus (20 Aug 2022 09:59)      INTERVAL HPI/OVERNIGHT EVENTS: Pt spiked T.max 100.1F, still appears tired but improved since yesterday.     MEDICATIONS  (STANDING):  aspirin enteric coated 81 milliGRAM(s) Oral daily  cefTRIAXone   IVPB 2000 milliGRAM(s) IV Intermittent every 24 hours  dextrose 5%. 1000 milliLiter(s) (50 mL/Hr) IV Continuous <Continuous>  dextrose 50% Injectable 25 Gram(s) IV Push once  enoxaparin Injectable 40 milliGRAM(s) SubCutaneous every 24 hours  gabapentin 300 milliGRAM(s) Oral three times a day  glucagon  Injectable 1 milliGRAM(s) IntraMuscular once  insulin lispro (ADMELOG) corrective regimen sliding scale   SubCutaneous at bedtime  insulin lispro (ADMELOG) corrective regimen sliding scale   SubCutaneous three times a day before meals  levothyroxine 100 MICROGram(s) Oral daily  losartan 50 milliGRAM(s) Oral daily  montelukast 10 milliGRAM(s) Oral daily  senna 2 Tablet(s) Oral at bedtime  sodium chloride 0.45% with potassium chloride 20 mEq/L 1000 milliLiter(s) (50 mL/Hr) IV Continuous <Continuous>    MEDICATIONS  (PRN):  ALBUTerol    90 MICROgram(s) HFA Inhaler 2 Puff(s) Inhalation every 6 hours PRN Shortness of Breath and/or Wheezing  dextrose Oral Gel 15 Gram(s) Oral once PRN Blood Glucose LESS THAN 70 milliGRAM(s)/deciliter  ondansetron Injectable 4 milliGRAM(s) IV Push every 6 hours PRN Nausea      __________________________________________________  REVIEW OF SYSTEMS:    CONSTITUTIONAL: No fever, tired+   EYES: no acute visual disturbances  NECK: No pain or stiffness  RESPIRATORY: No cough; No shortness of breath  CARDIOVASCULAR: No chest pain, no palpitations  GASTROINTESTINAL: No pain. No nausea or vomiting; No diarrhea   NEUROLOGICAL: No headache or numbness, no tremors  MUSCULOSKELETAL: No joint pain, no muscle pain  GENITOURINARY: no dysuria, no frequency, no hesitancy  PSYCHIATRY: no depression , no anxiety  ALL OTHER  ROS negative        Vital Signs Last 24 Hrs  T(C): 36.7 (20 Aug 2022 13:57), Max: 37.8 (20 Aug 2022 05:25)  T(F): 98.1 (20 Aug 2022 13:57), Max: 100.1 (20 Aug 2022 05:25)  HR: 83 (20 Aug 2022 13:57) (83 - 91)  BP: 164/83 (20 Aug 2022 13:57) (145/76 - 164/83)  BP(mean): --  RR: 18 (20 Aug 2022 13:57) (17 - 19)  SpO2: 95% (20 Aug 2022 13:57) (95% - 99%)    Parameters below as of 20 Aug 2022 13:57  Patient On (Oxygen Delivery Method): room air        ________________________________________________  PHYSICAL EXAM:  GENERAL: elderly female, ill appearing fatigued   HEENT: Normocephalic;  conjunctivae and sclerae clear; moist mucous membranes;   NECK : supple  CHEST/LUNG: b/l crackles at bases   HEART: S1 S2  regular; no murmurs, gallops or rubs  ABDOMEN: Soft, mil tenderness in b/l abd,  Bowel sounds present  EXTREMITIES: no cyanosis; no edema; no calf tenderness  SKIN: warm and dry; no rash  NERVOUS SYSTEM:  Awake and alert x 2 to name and place, non focal     _________________________________________________  LABS:                        9.8    9.86  )-----------( 174      ( 20 Aug 2022 10:14 )             30.3     08-20    136  |  101  |  13  ----------------------------<  200<H>  2.9<LL>   |  24  |  0.73    Ca    8.7      20 Aug 2022 10:14          CAPILLARY BLOOD GLUCOSE      POCT Blood Glucose.: 147 mg/dL (20 Aug 2022 16:06)  POCT Blood Glucose.: 203 mg/dL (20 Aug 2022 11:30)  POCT Blood Glucose.: 143 mg/dL (20 Aug 2022 07:50)  POCT Blood Glucose.: 138 mg/dL (19 Aug 2022 21:56)        RADIOLOGY & ADDITIONAL TESTS:    Imaging Personally Reviewed:  YES    Consultant(s) Notes Reviewed:   YES    Care Discussed with Consultants : YES     Plan of care was discussed with patient and /or primary care giver; all questions and concerns were addressed and care was aligned with patient's wishes.

## 2022-08-20 NOTE — CONSULT NOTE ADULT - SUBJECTIVE AND OBJECTIVE BOX
HPI:  76 y.o. F with PMH L UPJ calculus s/p cysto, stent March 2021, HTN, DM, hypothyroid presents to [facility] ER c/o weakness this morning. Pt was attempting to get up to use the restroom when she fell backwards. Denies head trauma and LOC. Admits to L hand pain. Pt also c/o L sided abd pain. Voiding well without dysuria or hematuria. Last meal last night. Daughter states pt did not take her medications today.     Pt was admitted in [facility] March 2021 under Dr. Benton for similar symptoms and diagnosed with L hydronephrosis secondary to L UPJ calculus. Pt underwent cysto, L stent insertion AD#2. Admission complicated by post-op agitation requiring haldol. Blood and urine culture grew pansensitive E.coli. Pt was discharged POD#4 on Ceftin for 14 days.     Daughter states after discharge pt followed up with a urologist at [facility] for stone and stent management. Daughter also notes that pt had urinary hesitancy and had uterus removed to correct issue. (17 Aug 2022 12:55)      PAST MEDICAL & SURGICAL HISTORY:  DM (diabetes mellitus)      HTN (hypertension)      HLD (hyperlipidemia)      Hypothyroidism      S/P cholecystectomy      History of hip surgery          No Known Allergies      Meds:  ALBUTerol    90 MICROgram(s) HFA Inhaler 2 Puff(s) Inhalation every 6 hours PRN  aspirin enteric coated 81 milliGRAM(s) Oral daily  cefTRIAXone   IVPB 2000 milliGRAM(s) IV Intermittent every 24 hours  dextrose 5%. 1000 milliLiter(s) IV Continuous <Continuous>  dextrose 50% Injectable 25 Gram(s) IV Push once  dextrose Oral Gel 15 Gram(s) Oral once PRN  enoxaparin Injectable 40 milliGRAM(s) SubCutaneous every 24 hours  gabapentin 300 milliGRAM(s) Oral three times a day  glucagon  Injectable 1 milliGRAM(s) IntraMuscular once  insulin lispro (ADMELOG) corrective regimen sliding scale   SubCutaneous at bedtime  insulin lispro (ADMELOG) corrective regimen sliding scale   SubCutaneous three times a day before meals  levothyroxine 100 MICROGram(s) Oral daily  losartan 50 milliGRAM(s) Oral daily  montelukast 10 milliGRAM(s) Oral daily  ondansetron Injectable 4 milliGRAM(s) IV Push every 6 hours PRN  senna 2 Tablet(s) Oral at bedtime  sodium chloride 0.45% with potassium chloride 20 mEq/L 1000 milliLiter(s) IV Continuous <Continuous>      SOCIAL HISTORY:  Smoker:  YES / NO        PACK YEARS:                         WHEN QUIT?  ETOH use:  YES / NO               FREQUENCY / QUANTITY:  Ilicit Drug use:  YES / NO  Occupation:  Assisted device use (Cane / Walker):  Live with:    FAMILY HISTORY:      VITALS:  Vital Signs Last 24 Hrs  T(C): 36.7 (20 Aug 2022 13:57), Max: 37.8 (20 Aug 2022 05:25)  T(F): 98.1 (20 Aug 2022 13:57), Max: 100.1 (20 Aug 2022 05:25)  HR: 83 (20 Aug 2022 13:57) (83 - 91)  BP: 164/83 (20 Aug 2022 13:57) (145/76 - 164/83)  BP(mean): --  RR: 18 (20 Aug 2022 13:57) (17 - 19)  SpO2: 95% (20 Aug 2022 13:57) (95% - 99%)    Parameters below as of 20 Aug 2022 13:57  Patient On (Oxygen Delivery Method): room air        LABS/DIAGNOSTIC TESTS:                          9.8    9.86  )-----------( 174      ( 20 Aug 2022 10:14 )             30.3     WBC Count: 9.86 K/uL (08-20 @ 10:14)  WBC Count: 12.56 K/uL (08-19 @ 04:46)  WBC Count: 16.66 K/uL (08-18 @ 05:30)      08-20    136  |  101  |  13  ----------------------------<  200<H>  2.9<LL>   |  24  |  0.73    Ca    8.7      20 Aug 2022 10:14                    LACTATE:    ABG -     CULTURES:   .Blood Blood-Peripheral  08-17 @ 10:55   Growth in aerobic and anaerobic bottles: Proteus mirabilis  See previous culture [id number]  --    Growth in anaerobic bottle: Gram Negative Rods  Growth in aerobic bottle: Gram Negative Rods      .Blood Blood-Peripheral  08-17 @ 10:50   Growth in aerobic and anaerobic bottles: Proteus mirabilis  ***Blood Panel PCR results on this specimen are available  approximately 3 hours after the Gram stain result.***  Gram stain, PCR, and/or culture results may not always  correspond due to difference in methodologies.  ************************************************************  This PCR assay was performed by multiplex PCR. This  Assay tests for 66 bacterial and resistance gene targets.  Please refer to the [facility] test directory  at [email or web address] for details.  --  Blood Culture PCR  Proteus mirabilis      Clean Catch Clean Catch (Midstream)  08-17 @ 09:40   >100,000 CFU/ml Proteus mirabilis  --  Proteus mirabilis          RADIOLOGY:< from: CT Abdomen and Pelvis w/ IV Cont (08.17.22 @ 10:35) >  ACC: [id number] EXAM:  CT ABDOMEN AND PELVIS IC                          PROCEDURE DATE:  08/17/2022          INTERPRETATION:  CLINICAL INFORMATION: Left-sided abdominal pain and   fever. Rule out diverticulitis.    COMPARISON: March 04, 2021    CONTRAST/COMPLICATIONS:  IV Contrast: Omnipaque 350  90 cc administered   10 cc discarded  Oral Contrast: NONE  Complications: None reported at time of study completion    PROCEDURE:  CT of the Abdomen and Pelvis was performed.  Sagittal and coronal reformats were performed.    FINDINGS:  LOWER CHEST: Within normal limits.    LIVER: Steatosis.  BILE DUCTS: Normal caliber.  GALLBLADDER: Cholecystectomy.  SPLEEN: Within normal limits.  PANCREAS: Within normal limits.  ADRENALS: Within normal limits.  KIDNEYS/URETERS: Left renal cyst. Left-sided perinephric stranding and   moderate hydronephrosis due to a proximal ureteral stone measuring 0.7 x   1.7 cm.    BLADDER: Poorly evaluated due to artifact from the patient's right hip   arthroplasty.  REPRODUCTIVE ORGANS: Hysterectomy. The ovaries appear to be present and   are unremarkable. Please correlate with surgical history.    BOWEL: No bowel obstruction. Appendix is not visualized. No evidence of   diverticulitis.  PERITONEUM: No ascites.  VESSELS: Atherosclerotic changes.  RETROPERITONEUM/LYMPH NODES: No lymphadenopathy.  ABDOMINAL WALL: Within normal limits.  BONES: Mild T12 compression deformity, age indeterminate but new since   March 04, 2021. Right hip arthroplasty.    IMPRESSION:  Large left proximal ureteral calculus causing moderate hydronephrosis.        --- End of Report ---            BISI LAMAS MD; Attending Radiologist  This document has been electronically signed. Aug 17 2022 11:08AM    < end of copied text >        ROS  [  ] UNABLE TO ELICIT               HPI:  76 y.o. F with PMH L UPJ calculus s/p cysto, stent March 2021, HTN, DM, hypothyroid presents to [facility] ER c/o weakness this morning. Pt was attempting to get up to use the restroom when she fell backwards. Denies head trauma and LOC. Admits to L hand pain. Pt also c/o L sided abd pain. Voiding well without dysuria or hematuria. Last meal last night. Daughter states pt did not take her medications today.     Pt was admitted in [facility] March 2021 under Dr. Benton for similar symptoms and diagnosed with L hydronephrosis secondary to L UPJ calculus. Pt underwent cysto, L stent insertion AD#2. Admission complicated by post-op agitation requiring haldol. Blood and urine culture grew pansensitive E.coli. Pt was discharged POD#4 on Ceftin for 14 days.     Daughter states after discharge pt followed up with a urologist at [facility] for stone and stent management. Daughter also notes that pt had urinary hesitancy and had uterus removed to correct issue. (17 Aug 2022 12:55)        History as above, asked to see this patient because  of a UTI/ Pyelonephritis and Bacteremia with Proteus, she is a poor historian but was brought in because of a fall and was found to a low grade fever , elevated WBC count and left sided abdominal pain and has a positive urine culture for Proteus as well as positive blood cultures for Proteus as well. Her fevers have resolved and her WBC count has normalized, she still has left sided abdominal pain, she denies any nausea , vomiting or diarrhea, no urinary symptoms either, no coughing or SOB, she has a H/O having a large left UPJ stone and had a stent in place at some time and follows with urology.        PAST MEDICAL & SURGICAL HISTORY:  DM (diabetes mellitus)      HTN (hypertension)      HLD (hyperlipidemia)      Hypothyroidism      S/P cholecystectomy      History of hip surgery          No Known Allergies      Meds:  ALBUTerol    90 MICROgram(s) HFA Inhaler 2 Puff(s) Inhalation every 6 hours PRN  aspirin enteric coated 81 milliGRAM(s) Oral daily  cefTRIAXone   IVPB 2000 milliGRAM(s) IV Intermittent every 24 hours  dextrose 5%. 1000 milliLiter(s) IV Continuous <Continuous>  dextrose 50% Injectable 25 Gram(s) IV Push once  dextrose Oral Gel 15 Gram(s) Oral once PRN  enoxaparin Injectable 40 milliGRAM(s) SubCutaneous every 24 hours  gabapentin 300 milliGRAM(s) Oral three times a day  glucagon  Injectable 1 milliGRAM(s) IntraMuscular once  insulin lispro (ADMELOG) corrective regimen sliding scale   SubCutaneous at bedtime  insulin lispro (ADMELOG) corrective regimen sliding scale   SubCutaneous three times a day before meals  levothyroxine 100 MICROGram(s) Oral daily  losartan 50 milliGRAM(s) Oral daily  montelukast 10 milliGRAM(s) Oral daily  ondansetron Injectable 4 milliGRAM(s) IV Push every 6 hours PRN  senna 2 Tablet(s) Oral at bedtime  sodium chloride 0.45% with potassium chloride 20 mEq/L 1000 milliLiter(s) IV Continuous <Continuous>      SOCIAL HISTORY:  Smoker:  no  ETOH use:  no    FAMILY HISTORY: not contributory      VITALS:  Vital Signs Last 24 Hrs  T(C): 36.7 (20 Aug 2022 13:57), Max: 37.8 (20 Aug 2022 05:25)  T(F): 98.1 (20 Aug 2022 13:57), Max: 100.1 (20 Aug 2022 05:25)  HR: 83 (20 Aug 2022 13:57) (83 - 91)  BP: 164/83 (20 Aug 2022 13:57) (145/76 - 164/83)  BP(mean): --  RR: 18 (20 Aug 2022 13:57) (17 - 19)  SpO2: 95% (20 Aug 2022 13:57) (95% - 99%)    Parameters below as of 20 Aug 2022 13:57  Patient On (Oxygen Delivery Method): room air        LABS/DIAGNOSTIC TESTS:                          9.8    9.86  )-----------( 174      ( 20 Aug 2022 10:14 )             30.3     WBC Count: 9.86 K/uL (08-20 @ 10:14)  WBC Count: 12.56 K/uL (08-19 @ 04:46)  WBC Count: 16.66 K/uL (08-18 @ 05:30)      08-20    136  |  101  |  13  ----------------------------<  200<H>  2.9<LL>   |  24  |  0.73    Ca    8.7      20 Aug 2022 10:14                    LACTATE:    ABG -     CULTURES:   .Blood Blood-Peripheral  08-17 @ 10:55   Growth in aerobic and anaerobic bottles: Proteus mirabilis  See previous culture [id number]  --    Growth in anaerobic bottle: Gram Negative Rods  Growth in aerobic bottle: Gram Negative Rods      .Blood Blood-Peripheral  08-17 @ 10:50   Growth in aerobic and anaerobic bottles: Proteus mirabilis  ***Blood Panel PCR results on this specimen are available  approximately 3 hours after the Gram stain result.***  Gram stain, PCR, and/or culture results may not always  correspond due to difference in methodologies.  ************************************************************  This PCR assay was performed by multiplex PCR. This  Assay tests for 66 bacterial and resistance gene targets.  Please refer to the [facility] Labs test directory  at [email or web address] for details.  --  Blood Culture PCR  Proteus mirabilis      Clean Catch Clean Catch (Midstream)  08-17 @ 09:40   >100,000 CFU/ml Proteus mirabilis  --  Proteus mirabilis          RADIOLOGY:< from: CT Abdomen and Pelvis w/ IV Cont (08.17.22 @ 10:35) >  ACC: [id number] EXAM:  CT ABDOMEN AND PELVIS IC                          PROCEDURE DATE:  08/17/2022          INTERPRETATION:  CLINICAL INFORMATION: Left-sided abdominal pain and   fever. Rule out diverticulitis.    COMPARISON: March 04, 2021    CONTRAST/COMPLICATIONS:  IV Contrast: Omnipaque 350  90 cc administered   10 cc discarded  Oral Contrast: NONE  Complications: None reported at time of study completion    PROCEDURE:  CT of the Abdomen and Pelvis was performed.  Sagittal and coronal reformats were performed.    FINDINGS:  LOWER CHEST: Within normal limits.    LIVER: Steatosis.  BILE DUCTS: Normal caliber.  GALLBLADDER: Cholecystectomy.  SPLEEN: Within normal limits.  PANCREAS: Within normal limits.  ADRENALS: Within normal limits.  KIDNEYS/URETERS: Left renal cyst. Left-sided perinephric stranding and   moderate hydronephrosis due to a proximal ureteral stone measuring 0.7 x   1.7 cm.    BLADDER: Poorly evaluated due to artifact from the patient's right hip   arthroplasty.  REPRODUCTIVE ORGANS: Hysterectomy. The ovaries appear to be present and   are unremarkable. Please correlate with surgical history.    BOWEL: No bowel obstruction. Appendix is not visualized. No evidence of   diverticulitis.  PERITONEUM: No ascites.  VESSELS: Atherosclerotic changes.  RETROPERITONEUM/LYMPH NODES: No lymphadenopathy.  ABDOMINAL WALL: Within normal limits.  BONES: Mild T12 compression deformity, age indeterminate but new since   March 04, 2021. Right hip arthroplasty.    IMPRESSION:  Large left proximal ureteral calculus causing moderate hydronephrosis.        --- End of Report ---            BISI LAMAS MD; Attending Radiologist  This document has been electronically signed. Aug 17 2022 11:08AM    < end of copied text >        ROS  [  ] UNABLE TO ELICIT

## 2022-08-20 NOTE — CHART NOTE - NSCHARTNOTEFT_GEN_A_CORE
76 yoF s/p cysto, L ureteral stent placement for obstructing stone pod#3, urosepsis    low grade 100.1, AM labs pending  UO 2L  Ucx/Bcx GNR, proteus mirab, on cefepime; ID consulted    D/w medicine team for transfer of service for further sepsis management, Dr. Osborne accepted.   Urology will follow while in house  Pt to follow up with Dr. Willis as outpt within 2-4 wks for elective lithotripsy upon resolution of UTI

## 2022-08-20 NOTE — PROGRESS NOTE ADULT - ASSESSMENT
76 yoF s/p cysto, L ureteral stent placement for obstructing stone pod#3, urosepsis    low grade 100.1, AM labs pending  UO 2L  Ucx/Bcx GNR, proteus mirab, on cefepime; ID consulted    - continue abx  - segura to gravity, I&O  - f/u on cx sensitivity, f/u ID recs  - consult PT  - appreciate medicine recs  - d/w Dr. Willis

## 2022-08-21 LAB
ANION GAP SERPL CALC-SCNC: 7 MMOL/L — SIGNIFICANT CHANGE UP (ref 5–17)
BUN SERPL-MCNC: 11 MG/DL — SIGNIFICANT CHANGE UP (ref 7–18)
CALCIUM SERPL-MCNC: 8.2 MG/DL — LOW (ref 8.4–10.5)
CHLORIDE SERPL-SCNC: 103 MMOL/L — SIGNIFICANT CHANGE UP (ref 96–108)
CO2 SERPL-SCNC: 25 MMOL/L — SIGNIFICANT CHANGE UP (ref 22–31)
CREAT SERPL-MCNC: 0.66 MG/DL — SIGNIFICANT CHANGE UP (ref 0.5–1.3)
EGFR: 91 ML/MIN/1.73M2 — SIGNIFICANT CHANGE UP
GLUCOSE BLDC GLUCOMTR-MCNC: 144 MG/DL — HIGH (ref 70–99)
GLUCOSE BLDC GLUCOMTR-MCNC: 152 MG/DL — HIGH (ref 70–99)
GLUCOSE BLDC GLUCOMTR-MCNC: 154 MG/DL — HIGH (ref 70–99)
GLUCOSE BLDC GLUCOMTR-MCNC: 157 MG/DL — HIGH (ref 70–99)
GLUCOSE SERPL-MCNC: 158 MG/DL — HIGH (ref 70–99)
HCT VFR BLD CALC: 30.7 % — LOW (ref 34.5–45)
HGB BLD-MCNC: 10.1 G/DL — LOW (ref 11.5–15.5)
MAGNESIUM SERPL-MCNC: 1.8 MG/DL — SIGNIFICANT CHANGE UP (ref 1.6–2.6)
MCHC RBC-ENTMCNC: 27.5 PG — SIGNIFICANT CHANGE UP (ref 27–34)
MCHC RBC-ENTMCNC: 32.9 GM/DL — SIGNIFICANT CHANGE UP (ref 32–36)
MCV RBC AUTO: 83.7 FL — SIGNIFICANT CHANGE UP (ref 80–100)
NRBC # BLD: 0 /100 WBCS — SIGNIFICANT CHANGE UP (ref 0–0)
PHOSPHATE SERPL-MCNC: 1.4 MG/DL — LOW (ref 2.5–4.5)
PLATELET # BLD AUTO: 202 K/UL — SIGNIFICANT CHANGE UP (ref 150–400)
POTASSIUM SERPL-MCNC: 3 MMOL/L — LOW (ref 3.5–5.3)
POTASSIUM SERPL-SCNC: 3 MMOL/L — LOW (ref 3.5–5.3)
RBC # BLD: 3.67 M/UL — LOW (ref 3.8–5.2)
RBC # FLD: 13 % — SIGNIFICANT CHANGE UP (ref 10.3–14.5)
SODIUM SERPL-SCNC: 135 MMOL/L — SIGNIFICANT CHANGE UP (ref 135–145)
WBC # BLD: 9.26 K/UL — SIGNIFICANT CHANGE UP (ref 3.8–10.5)
WBC # FLD AUTO: 9.26 K/UL — SIGNIFICANT CHANGE UP (ref 3.8–10.5)

## 2022-08-21 PROCEDURE — 99233 SBSQ HOSP IP/OBS HIGH 50: CPT

## 2022-08-21 PROCEDURE — 99232 SBSQ HOSP IP/OBS MODERATE 35: CPT

## 2022-08-21 RX ORDER — POLYETHYLENE GLYCOL 3350 17 G/17G
17 POWDER, FOR SOLUTION ORAL
Refills: 0 | Status: DISCONTINUED | OUTPATIENT
Start: 2022-08-21 | End: 2022-08-24

## 2022-08-21 RX ORDER — POTASSIUM PHOSPHATE, MONOBASIC POTASSIUM PHOSPHATE, DIBASIC 236; 224 MG/ML; MG/ML
15 INJECTION, SOLUTION INTRAVENOUS ONCE
Refills: 0 | Status: COMPLETED | OUTPATIENT
Start: 2022-08-21 | End: 2022-08-21

## 2022-08-21 RX ORDER — POTASSIUM CHLORIDE 20 MEQ
40 PACKET (EA) ORAL EVERY 4 HOURS
Refills: 0 | Status: COMPLETED | OUTPATIENT
Start: 2022-08-21 | End: 2022-08-21

## 2022-08-21 RX ADMIN — GABAPENTIN 300 MILLIGRAM(S): 400 CAPSULE ORAL at 13:22

## 2022-08-21 RX ADMIN — GABAPENTIN 300 MILLIGRAM(S): 400 CAPSULE ORAL at 06:06

## 2022-08-21 RX ADMIN — POTASSIUM PHOSPHATE, MONOBASIC POTASSIUM PHOSPHATE, DIBASIC 62.5 MILLIMOLE(S): 236; 224 INJECTION, SOLUTION INTRAVENOUS at 11:46

## 2022-08-21 RX ADMIN — Medication 2: at 11:46

## 2022-08-21 RX ADMIN — Medication 40 MILLIEQUIVALENT(S): at 07:25

## 2022-08-21 RX ADMIN — CEFTRIAXONE 100 MILLIGRAM(S): 500 INJECTION, POWDER, FOR SOLUTION INTRAMUSCULAR; INTRAVENOUS at 17:29

## 2022-08-21 RX ADMIN — ENOXAPARIN SODIUM 40 MILLIGRAM(S): 100 INJECTION SUBCUTANEOUS at 06:06

## 2022-08-21 RX ADMIN — GABAPENTIN 300 MILLIGRAM(S): 400 CAPSULE ORAL at 21:22

## 2022-08-21 RX ADMIN — POLYETHYLENE GLYCOL 3350 17 GRAM(S): 17 POWDER, FOR SOLUTION ORAL at 17:30

## 2022-08-21 RX ADMIN — SENNA PLUS 2 TABLET(S): 8.6 TABLET ORAL at 21:22

## 2022-08-21 RX ADMIN — LOSARTAN POTASSIUM 50 MILLIGRAM(S): 100 TABLET, FILM COATED ORAL at 06:06

## 2022-08-21 RX ADMIN — Medication 81 MILLIGRAM(S): at 13:22

## 2022-08-21 RX ADMIN — Medication 40 MILLIEQUIVALENT(S): at 17:29

## 2022-08-21 RX ADMIN — MONTELUKAST 10 MILLIGRAM(S): 4 TABLET, CHEWABLE ORAL at 13:23

## 2022-08-21 RX ADMIN — Medication 100 MICROGRAM(S): at 06:06

## 2022-08-21 RX ADMIN — Medication 2: at 08:08

## 2022-08-21 NOTE — PHYSICAL THERAPY INITIAL EVALUATION ADULT - GENERAL OBSERVATIONS, REHAB EVAL
pt was seen sitting in the bedside chair, denies pain. pt was cooperative during the assessment. pt's daughter present at bedside.

## 2022-08-21 NOTE — PROGRESS NOTE ADULT - ASSESSMENT
76 yoF s/p cysto, L ureteral stent placement for obstructing stone pod#4, urosepsis  afebrile, wbc wnl   UO 2L  Ucx/Bcx GNR, proteus mirab, on cefepime; ID consulted    - continue abx, as per ID   - segura to gravity, I&O  - f/u on cx sensitivity, f/u ID recs  - consult PT  - replete electrolytes   - care as per primary team  - d/w Dr. Willis

## 2022-08-21 NOTE — PHYSICAL THERAPY INITIAL EVALUATION ADULT - PERTINENT HX OF CURRENT PROBLEM, REHAB EVAL
76 yoF s/p cysto, L ureteral stent placement for obstructing stone pod#4, urosepsis  afebrile, wbc wnl. Pt was admitted due to sepsis secondary to UTI, Left ureteral calculus.

## 2022-08-21 NOTE — PROGRESS NOTE ADULT - SUBJECTIVE AND OBJECTIVE BOX
Patient is a 76y old  Female who presents with a chief complaint of Sepsis secondary to UTI, Left ureteral calculus (21 Aug 2022 12:39)      INTERVAL HPI/OVERNIGHT EVENTS: Patient remained afebrile, feels better now. She was Out of bed to chair this morning     MEDICATIONS  (STANDING):  aspirin enteric coated 81 milliGRAM(s) Oral daily  cefTRIAXone   IVPB 2000 milliGRAM(s) IV Intermittent every 24 hours  dextrose 5%. 1000 milliLiter(s) (50 mL/Hr) IV Continuous <Continuous>  dextrose 50% Injectable 25 Gram(s) IV Push once  enoxaparin Injectable 40 milliGRAM(s) SubCutaneous every 24 hours  gabapentin 300 milliGRAM(s) Oral three times a day  glucagon  Injectable 1 milliGRAM(s) IntraMuscular once  insulin lispro (ADMELOG) corrective regimen sliding scale   SubCutaneous three times a day before meals  insulin lispro (ADMELOG) corrective regimen sliding scale   SubCutaneous at bedtime  levothyroxine 100 MICROGram(s) Oral daily  losartan 50 milliGRAM(s) Oral daily  montelukast 10 milliGRAM(s) Oral daily  polyethylene glycol 3350 17 Gram(s) Oral two times a day  potassium chloride    Tablet ER 40 milliEquivalent(s) Oral every 4 hours  senna 2 Tablet(s) Oral at bedtime    MEDICATIONS  (PRN):  ALBUTerol    90 MICROgram(s) HFA Inhaler 2 Puff(s) Inhalation every 6 hours PRN Shortness of Breath and/or Wheezing  dextrose Oral Gel 15 Gram(s) Oral once PRN Blood Glucose LESS THAN 70 milliGRAM(s)/deciliter  ondansetron Injectable 4 milliGRAM(s) IV Push every 6 hours PRN Nausea      __________________________________________________  REVIEW OF SYSTEMS:    CONSTITUTIONAL: No fever,   EYES: no acute visual disturbances  NECK: No pain or stiffness  RESPIRATORY: No cough; No shortness of breath  CARDIOVASCULAR: No chest pain, no palpitations  GASTROINTESTINAL: mild abd discomfort . No nausea or vomiting; No diarrhea   NEUROLOGICAL: No headache or numbness, no tremors  MUSCULOSKELETAL: No joint pain, no muscle pain  GENITOURINARY: no dysuria, no frequency, no hesitancy  PSYCHIATRY: no depression , no anxiety  ALL OTHER  ROS negative        Vital Signs Last 24 Hrs  T(C): 37.3 (21 Aug 2022 14:45), Max: 37.3 (21 Aug 2022 14:45)  T(F): 99.1 (21 Aug 2022 14:45), Max: 99.1 (21 Aug 2022 14:45)  HR: 82 (21 Aug 2022 14:45) (81 - 86)  BP: 144/80 (21 Aug 2022 14:45) (139/66 - 175/82)  BP(mean): --  RR: 18 (21 Aug 2022 14:45) (16 - 18)  SpO2: 94% (21 Aug 2022 14:45) (94% - 98%)    Parameters below as of 21 Aug 2022 14:45  Patient On (Oxygen Delivery Method): room air        ________________________________________________  PHYSICAL EXAM:  GENERAL: NAD  HEENT: Normocephalic;  conjunctivae and sclerae clear; moist mucous membranes;   NECK : supple  CHEST/LUNG: Clear to auscultation bilaterally with good air entry   HEART: S1 S2  regular; no murmurs, gallops or rubs  ABDOMEN: Soft, Nontender, Nondistended; Bowel sounds present  EXTREMITIES: no cyanosis; no edema; no calf tenderness  SKIN: warm and dry; no rash  NERVOUS SYSTEM:  Awake and alert; Oriented  to place, person and time ; no new deficits, less fatigued   : Mor +     _________________________________________________  LABS:                        10.1   9.26  )-----------( 202      ( 21 Aug 2022 04:46 )             30.7     08-21    135  |  103  |  11  ----------------------------<  158<H>  3.0<L>   |  25  |  0.66    Ca    8.2<L>      21 Aug 2022 04:46  Phos  1.4     08-21  Mg     1.8     08-21          CAPILLARY BLOOD GLUCOSE      POCT Blood Glucose.: 152 mg/dL (21 Aug 2022 11:34)  POCT Blood Glucose.: 157 mg/dL (21 Aug 2022 07:50)  POCT Blood Glucose.: 134 mg/dL (20 Aug 2022 20:23)  POCT Blood Glucose.: 147 mg/dL (20 Aug 2022 16:06)        RADIOLOGY & ADDITIONAL TESTS:    Imaging Personally Reviewed:  YES    Consultant(s) Notes Reviewed:   YES    Care Discussed with Consultants : YES     Plan of care was discussed with patient and /or primary care giver; all questions and concerns were addressed and care was aligned with patient's wishes.

## 2022-08-21 NOTE — PROGRESS NOTE ADULT - ASSESSMENT
Patient is a 76 y.o. F with PMH L UPJ calculus s/p cysto, stent March 2021, HTN, DM, hypothyroid presents to [facility] ER c/o weakness, found to have a  Large left proximal ureteral calculus causing moderate hydronephrosis. Admitted for Urosepsis 2/2 obstructive uropathy s/p stent placement on 8/17. Hospital course c/b GNR bacteremia being treated w/ Iv abx.       A/P:  #Urosepsis 2/ obstructive uropathy s/p stent placement   #Bacteremia   #Acute metabolic encephalopathy- infectious etiology - improved   #Hypokalemia  #Hypophosphatemia   #Hypertension  #Hypothyroidism  #DM  #RICKEY- resolved     Plan:  -Pt remained afebrile for last 24 hrs, c/w IV Rocephin, repeat blood cx testing. ID consult   -Mental status significantly improved  -Replete electrolytes   -BP stable, c/w losartan   -C/w levothyroxine   -C/w Juares   -Stool softeners   -OOBC, PT eval

## 2022-08-21 NOTE — PROGRESS NOTE ADULT - ATTENDING COMMENTS
6 y.o. F with sepsis secondary to UTI, L UPJ calculus obstruction s/p cysto, L stent insertion    -Consistent carb, DASH diet as tolerated  -IVF resuscitation  -Abx--> CTX  -f/u urine & blood cx--> Blood gram negative rods  -Pain control prn  -DVT ppx    RICKEY  -IVF resuscitation  -L stent drainage  -Trend Cr- imroved 1.2    DM  -ISS while inhouse    HTN  -con't Losartan    COPD  -con't nebulizer    Hypothyroidism  -con't Synthroid
76 yoF s/p cysto, L ureteral stent placement for obstructing stone pod#3, urosepsis    low grade 100.1, AM labs pending  UO 2L  Ucx/Bcx GNR, proteus mirab, on cefepime; ID consulted    - continue abx  - segura to gravity, I&O  - f/u on cx sensitivity, f/u ID recs  - consult PT  - appreciate medicine recs
76 yoF s/p cysto, L ureteral stent placement for obstructing stone pod#4, urosepsis  afebrile, wbc wnl   UO 2L  Ucx/Bcx GNR, proteus mirab, on cefepime; ID consulted    - continue abx, as per ID   - segura to gravity, I&O  - f/u on cx sensitivity, f/u ID recs  - consult PT  - replete electrolytes   - care as per primary team  - reviewed with pt to f/u with outpt uro for management stone/stent
76 y.o. F with sepsis secondary to UTI, L UPJ calculus obstruction s/p cysto, L stent insertion 8/17  Fever x 2 in 24 hrs  Bacteremia - GNR  Leukocytosis improving  Tachypnea but BP controlled, ordered stat chest xray.    -Consistent carb, DASH diet as tolerated  - Reduce IVF  - Antibiotics changed to Cefepime  -Pain control prn  -DVT ppx  -Medicine consult    RICKEY  -resolved    DM  -ISS while inhouse    HTN  -con't Losartan    COPD  -con't nebulizer    Hypothyroidism  -con't Synthroi

## 2022-08-21 NOTE — PROGRESS NOTE ADULT - SUBJECTIVE AND OBJECTIVE BOX
INTERVAL HPI/OVERNIGHT EVENTS:  Patient seen and examined at bedside.    Pt resting comfortably. No acute complaints.   Tolerating diet.   Denies any abdominal or back pain.   Patient denies chest pain, shortness of breath, fever, chills.     MEDICATIONS  (STANDING):  aspirin enteric coated 81 milliGRAM(s) Oral daily  cefTRIAXone   IVPB 2000 milliGRAM(s) IV Intermittent every 24 hours  dextrose 5%. 1000 milliLiter(s) (50 mL/Hr) IV Continuous <Continuous>  dextrose 50% Injectable 25 Gram(s) IV Push once  enoxaparin Injectable 40 milliGRAM(s) SubCutaneous every 24 hours  gabapentin 300 milliGRAM(s) Oral three times a day  glucagon  Injectable 1 milliGRAM(s) IntraMuscular once  insulin lispro (ADMELOG) corrective regimen sliding scale   SubCutaneous three times a day before meals  insulin lispro (ADMELOG) corrective regimen sliding scale   SubCutaneous at bedtime  levothyroxine 100 MICROGram(s) Oral daily  losartan 50 milliGRAM(s) Oral daily  montelukast 10 milliGRAM(s) Oral daily  potassium chloride    Tablet ER 40 milliEquivalent(s) Oral every 4 hours  senna 2 Tablet(s) Oral at bedtime    MEDICATIONS  (PRN):  ALBUTerol    90 MICROgram(s) HFA Inhaler 2 Puff(s) Inhalation every 6 hours PRN Shortness of Breath and/or Wheezing  dextrose Oral Gel 15 Gram(s) Oral once PRN Blood Glucose LESS THAN 70 milliGRAM(s)/deciliter  ondansetron Injectable 4 milliGRAM(s) IV Push every 6 hours PRN Nausea      Vital Signs Last 24 Hrs  T(C): 36.9 (21 Aug 2022 06:25), Max: 37.1 (20 Aug 2022 20:47)  T(F): 98.5 (21 Aug 2022 06:25), Max: 98.8 (20 Aug 2022 20:47)  HR: 85 (21 Aug 2022 06:25) (83 - 86)  BP: 175/82 (21 Aug 2022 06:25) (159/82 - 175/82)  BP(mean): --  RR: 16 (21 Aug 2022 06:25) (16 - 18)  SpO2: 98% (21 Aug 2022 06:25) (94% - 98%)    Parameters below as of 21 Aug 2022 06:25  Patient On (Oxygen Delivery Method): room air        Physical:  General: A&O NAD.  Respiratory: non labored  Abdomen: Soft, nondistended, nontender, no rebound tenderness,  : segura in place, draining yellow urine       I&O's Detail    20 Aug 2022 07:01  -  21 Aug 2022 07:00  --------------------------------------------------------  IN:    sodium chloride 0.45% w/ Additives: 500 mL  Total IN: 500 mL    OUT:    Indwelling Catheter - Urethral (mL): 3600 mL  Total OUT: 3600 mL    Total NET: -3100 mL          LABS:                        10.1   9.26  )-----------( 202      ( 21 Aug 2022 04:46 )             30.7             08-21    135  |  103  |  11  ----------------------------<  158<H>  3.0<L>   |  25  |  0.66    Ca    8.2<L>      21 Aug 2022 04:46  Phos  1.4     08-21  Mg     1.8     08-21

## 2022-08-22 ENCOUNTER — TRANSCRIPTION ENCOUNTER (OUTPATIENT)
Age: 77
End: 2022-08-22

## 2022-08-22 DIAGNOSIS — Z02.9 ENCOUNTER FOR ADMINISTRATIVE EXAMINATIONS, UNSPECIFIED: ICD-10-CM

## 2022-08-22 DIAGNOSIS — N39.0 URINARY TRACT INFECTION, SITE NOT SPECIFIED: ICD-10-CM

## 2022-08-22 DIAGNOSIS — E83.39 OTHER DISORDERS OF PHOSPHORUS METABOLISM: ICD-10-CM

## 2022-08-22 DIAGNOSIS — Z29.9 ENCOUNTER FOR PROPHYLACTIC MEASURES, UNSPECIFIED: ICD-10-CM

## 2022-08-22 DIAGNOSIS — I10 ESSENTIAL (PRIMARY) HYPERTENSION: ICD-10-CM

## 2022-08-22 DIAGNOSIS — E87.6 HYPOKALEMIA: ICD-10-CM

## 2022-08-22 DIAGNOSIS — E03.9 HYPOTHYROIDISM, UNSPECIFIED: ICD-10-CM

## 2022-08-22 DIAGNOSIS — E11.9 TYPE 2 DIABETES MELLITUS WITHOUT COMPLICATIONS: ICD-10-CM

## 2022-08-22 DIAGNOSIS — R78.81 BACTEREMIA: ICD-10-CM

## 2022-08-22 LAB
ANION GAP SERPL CALC-SCNC: 11 MMOL/L — SIGNIFICANT CHANGE UP (ref 5–17)
BUN SERPL-MCNC: 13 MG/DL — SIGNIFICANT CHANGE UP (ref 7–18)
CALCIUM SERPL-MCNC: 8.8 MG/DL — SIGNIFICANT CHANGE UP (ref 8.4–10.5)
CHLORIDE SERPL-SCNC: 104 MMOL/L — SIGNIFICANT CHANGE UP (ref 96–108)
CO2 SERPL-SCNC: 22 MMOL/L — SIGNIFICANT CHANGE UP (ref 22–31)
CREAT SERPL-MCNC: 0.66 MG/DL — SIGNIFICANT CHANGE UP (ref 0.5–1.3)
EGFR: 91 ML/MIN/1.73M2 — SIGNIFICANT CHANGE UP
GLUCOSE BLDC GLUCOMTR-MCNC: 142 MG/DL — HIGH (ref 70–99)
GLUCOSE BLDC GLUCOMTR-MCNC: 163 MG/DL — HIGH (ref 70–99)
GLUCOSE BLDC GLUCOMTR-MCNC: 170 MG/DL — HIGH (ref 70–99)
GLUCOSE BLDC GLUCOMTR-MCNC: 236 MG/DL — HIGH (ref 70–99)
GLUCOSE SERPL-MCNC: 166 MG/DL — HIGH (ref 70–99)
HCT VFR BLD CALC: 31.8 % — LOW (ref 34.5–45)
HGB BLD-MCNC: 10.4 G/DL — LOW (ref 11.5–15.5)
MCHC RBC-ENTMCNC: 28 PG — SIGNIFICANT CHANGE UP (ref 27–34)
MCHC RBC-ENTMCNC: 32.7 GM/DL — SIGNIFICANT CHANGE UP (ref 32–36)
MCV RBC AUTO: 85.7 FL — SIGNIFICANT CHANGE UP (ref 80–100)
NRBC # BLD: 0 /100 WBCS — SIGNIFICANT CHANGE UP (ref 0–0)
PHOSPHATE SERPL-MCNC: 2 MG/DL — LOW (ref 2.5–4.5)
PLATELET # BLD AUTO: 265 K/UL — SIGNIFICANT CHANGE UP (ref 150–400)
POTASSIUM SERPL-MCNC: 4 MMOL/L — SIGNIFICANT CHANGE UP (ref 3.5–5.3)
POTASSIUM SERPL-SCNC: 4 MMOL/L — SIGNIFICANT CHANGE UP (ref 3.5–5.3)
RBC # BLD: 3.71 M/UL — LOW (ref 3.8–5.2)
RBC # FLD: 13.2 % — SIGNIFICANT CHANGE UP (ref 10.3–14.5)
SODIUM SERPL-SCNC: 137 MMOL/L — SIGNIFICANT CHANGE UP (ref 135–145)
WBC # BLD: 12.02 K/UL — HIGH (ref 3.8–10.5)
WBC # FLD AUTO: 12.02 K/UL — HIGH (ref 3.8–10.5)

## 2022-08-22 PROCEDURE — 74420 UROGRAPHY RTRGR +-KUB: CPT | Mod: 26

## 2022-08-22 PROCEDURE — 99233 SBSQ HOSP IP/OBS HIGH 50: CPT

## 2022-08-22 PROCEDURE — 52332 CYSTOSCOPY AND TREATMENT: CPT | Mod: LT

## 2022-08-22 RX ORDER — POTASSIUM PHOSPHATE, MONOBASIC POTASSIUM PHOSPHATE, DIBASIC 236; 224 MG/ML; MG/ML
15 INJECTION, SOLUTION INTRAVENOUS ONCE
Refills: 0 | Status: COMPLETED | OUTPATIENT
Start: 2022-08-22 | End: 2022-08-22

## 2022-08-22 RX ADMIN — POLYETHYLENE GLYCOL 3350 17 GRAM(S): 17 POWDER, FOR SOLUTION ORAL at 17:38

## 2022-08-22 RX ADMIN — CEFTRIAXONE 100 MILLIGRAM(S): 500 INJECTION, POWDER, FOR SOLUTION INTRAMUSCULAR; INTRAVENOUS at 17:38

## 2022-08-22 RX ADMIN — SENNA PLUS 2 TABLET(S): 8.6 TABLET ORAL at 21:46

## 2022-08-22 RX ADMIN — POLYETHYLENE GLYCOL 3350 17 GRAM(S): 17 POWDER, FOR SOLUTION ORAL at 05:51

## 2022-08-22 RX ADMIN — Medication 2: at 12:25

## 2022-08-22 RX ADMIN — Medication 2: at 08:14

## 2022-08-22 RX ADMIN — GABAPENTIN 300 MILLIGRAM(S): 400 CAPSULE ORAL at 21:48

## 2022-08-22 RX ADMIN — LOSARTAN POTASSIUM 50 MILLIGRAM(S): 100 TABLET, FILM COATED ORAL at 05:51

## 2022-08-22 RX ADMIN — GABAPENTIN 300 MILLIGRAM(S): 400 CAPSULE ORAL at 05:51

## 2022-08-22 RX ADMIN — Medication 81 MILLIGRAM(S): at 13:01

## 2022-08-22 RX ADMIN — MONTELUKAST 10 MILLIGRAM(S): 4 TABLET, CHEWABLE ORAL at 13:01

## 2022-08-22 RX ADMIN — POTASSIUM PHOSPHATE, MONOBASIC POTASSIUM PHOSPHATE, DIBASIC 62.5 MILLIMOLE(S): 236; 224 INJECTION, SOLUTION INTRAVENOUS at 17:37

## 2022-08-22 RX ADMIN — GABAPENTIN 300 MILLIGRAM(S): 400 CAPSULE ORAL at 13:00

## 2022-08-22 RX ADMIN — ENOXAPARIN SODIUM 40 MILLIGRAM(S): 100 INJECTION SUBCUTANEOUS at 05:50

## 2022-08-22 RX ADMIN — Medication 100 MICROGRAM(S): at 05:51

## 2022-08-22 NOTE — DISCHARGE NOTE PROVIDER - CARE PROVIDER_API CALL
Nikolas Willis)  Urology  [address] [facility], [address]  [place] [address]  Phone: [phone]  Fax: [phone]  Follow Up Time:

## 2022-08-22 NOTE — PROGRESS NOTE ADULT - SUBJECTIVE AND OBJECTIVE BOX
NP Note discussed with  primary attending    Patient is a 76y old  Female who presents with a chief complaint of Sepsis secondary to UTI, Left ureteral calculus (22 Aug 2022 09:38)      INTERVAL HPI/OVERNIGHT EVENTS: no new complaints    MEDICATIONS  (STANDING):  aspirin enteric coated 81 milliGRAM(s) Oral daily  cefTRIAXone   IVPB 2000 milliGRAM(s) IV Intermittent every 24 hours  dextrose 5%. 1000 milliLiter(s) (50 mL/Hr) IV Continuous <Continuous>  dextrose 50% Injectable 25 Gram(s) IV Push once  enoxaparin Injectable 40 milliGRAM(s) SubCutaneous every 24 hours  gabapentin 300 milliGRAM(s) Oral three times a day  glucagon  Injectable 1 milliGRAM(s) IntraMuscular once  insulin lispro (ADMELOG) corrective regimen sliding scale   SubCutaneous at bedtime  insulin lispro (ADMELOG) corrective regimen sliding scale   SubCutaneous three times a day before meals  levothyroxine 100 MICROGram(s) Oral daily  losartan 50 milliGRAM(s) Oral daily  montelukast 10 milliGRAM(s) Oral daily  polyethylene glycol 3350 17 Gram(s) Oral two times a day  potassium phosphate IVPB 15 milliMole(s) IV Intermittent once  senna 2 Tablet(s) Oral at bedtime    MEDICATIONS  (PRN):  ALBUTerol    90 MICROgram(s) HFA Inhaler 2 Puff(s) Inhalation every 6 hours PRN Shortness of Breath and/or Wheezing  dextrose Oral Gel 15 Gram(s) Oral once PRN Blood Glucose LESS THAN 70 milliGRAM(s)/deciliter  ondansetron Injectable 4 milliGRAM(s) IV Push every 6 hours PRN Nausea      __________________________________________________  REVIEW OF SYSTEMS:    CONSTITUTIONAL: No fever,   EYES: no acute visual disturbances  NECK: No pain or stiffness  RESPIRATORY: No cough; No shortness of breath  CARDIOVASCULAR: No chest pain, no palpitations  GASTROINTESTINAL: No pain. No nausea or vomiting; No diarrhea   NEUROLOGICAL: No headache or numbness, no tremors  MUSCULOSKELETAL: No joint pain, no muscle pain  GENITOURINARY: no dysuria, no frequency, no hesitancy  PSYCHIATRY: no depression , no anxiety  ALL OTHER  ROS negative        Vital Signs Last 24 Hrs  T(C): 37.4 (22 Aug 2022 05:46), Max: 37.4 (22 Aug 2022 05:46)  T(F): 99.4 (22 Aug 2022 05:46), Max: 99.4 (22 Aug 2022 05:46)  HR: 84 (22 Aug 2022 05:46) (82 - 89)  BP: 123/74 (22 Aug 2022 05:46) (123/74 - 172/85)  BP(mean): 114 (21 Aug 2022 20:45) (114 - 114)  RR: 17 (22 Aug 2022 05:46) (17 - 18)  SpO2: 95% (22 Aug 2022 05:46) (94% - 95%)    Parameters below as of 22 Aug 2022 05:46  Patient On (Oxygen Delivery Method): room air        ________________________________________________  PHYSICAL EXAM:  GENERAL: NAD  HEENT: Normocephalic;  conjunctivae and sclerae clear; moist mucous membranes;   NECK : supple  CHEST/LUNG: Clear to ausculitation bilaterally with good air entry   HEART: S1 S2  regular; no murmurs, gallops or rubs  ABDOMEN: Soft, Nontender, Nondistended; Bowel sounds present  EXTREMITIES: no cyanosis; no edema; no calf tenderness  SKIN: warm and dry; no rash  NERVOUS SYSTEM:  Awake and alert; Oriented  to place, person and time ; no new deficits    _________________________________________________  LABS:                        10.4   12.02 )-----------( 265      ( 22 Aug 2022 05:25 )             31.8     08-22    137  |  104  |  13  ----------------------------<  166<H>  4.0   |  22  |  0.66    Ca    8.8      22 Aug 2022 05:25  Phos  2.0     08-22  Mg     1.8     08-21          CAPILLARY BLOOD GLUCOSE      POCT Blood Glucose.: 163 mg/dL (22 Aug 2022 11:33)  POCT Blood Glucose.: 170 mg/dL (22 Aug 2022 07:45)  POCT Blood Glucose.: 154 mg/dL (21 Aug 2022 21:24)  POCT Blood Glucose.: 144 mg/dL (21 Aug 2022 16:30)        RADIOLOGY & ADDITIONAL TESTS:    Imaging Personally Reviewed:  YES/NO    Consultant(s) Notes Reviewed:   YES/ No    Care Discussed with Consultants :     Plan of care was discussed with patient and /or primary care giver; all questions and concerns were addressed and care was aligned with patient's wishes.     NP Note discussed with  primary attending    Patient is a 76y old  Female who presents with a chief complaint of Sepsis secondary to UTI, Left ureteral calculus (22 Aug 2022 09:38)      INTERVAL HPI/OVERNIGHT EVENTS: no new complaints    MEDICATIONS  (STANDING):  aspirin enteric coated 81 milliGRAM(s) Oral daily  cefTRIAXone   IVPB 2000 milliGRAM(s) IV Intermittent every 24 hours  dextrose 5%. 1000 milliLiter(s) (50 mL/Hr) IV Continuous <Continuous>  dextrose 50% Injectable 25 Gram(s) IV Push once  enoxaparin Injectable 40 milliGRAM(s) SubCutaneous every 24 hours  gabapentin 300 milliGRAM(s) Oral three times a day  glucagon  Injectable 1 milliGRAM(s) IntraMuscular once  insulin lispro (ADMELOG) corrective regimen sliding scale   SubCutaneous at bedtime  insulin lispro (ADMELOG) corrective regimen sliding scale   SubCutaneous three times a day before meals  levothyroxine 100 MICROGram(s) Oral daily  losartan 50 milliGRAM(s) Oral daily  montelukast 10 milliGRAM(s) Oral daily  polyethylene glycol 3350 17 Gram(s) Oral two times a day  potassium phosphate IVPB 15 milliMole(s) IV Intermittent once  senna 2 Tablet(s) Oral at bedtime    MEDICATIONS  (PRN):  ALBUTerol    90 MICROgram(s) HFA Inhaler 2 Puff(s) Inhalation every 6 hours PRN Shortness of Breath and/or Wheezing  dextrose Oral Gel 15 Gram(s) Oral once PRN Blood Glucose LESS THAN 70 milliGRAM(s)/deciliter  ondansetron Injectable 4 milliGRAM(s) IV Push every 6 hours PRN Nausea      __________________________________________________  REVIEW OF SYSTEMS:    CONSTITUTIONAL: No fever,   RESPIRATORY: No cough; No shortness of breath  CARDIOVASCULAR: No chest pain, no palpitations  GASTROINTESTINAL: No pain. No nausea or vomiting; No diarrhea   NEUROLOGICAL: No headache or numbness, no tremors  MUSCULOSKELETAL: No joint pain, no muscle pain  GENITOURINARY: segura   PSYCHIATRY: no depression , no anxiety  ALL OTHER  ROS negative        Vital Signs Last 24 Hrs  T(C): 37.4 (22 Aug 2022 05:46), Max: 37.4 (22 Aug 2022 05:46)  T(F): 99.4 (22 Aug 2022 05:46), Max: 99.4 (22 Aug 2022 05:46)  HR: 84 (22 Aug 2022 05:46) (82 - 89)  BP: 123/74 (22 Aug 2022 05:46) (123/74 - 172/85)  BP(mean): 114 (21 Aug 2022 20:45) (114 - 114)  RR: 17 (22 Aug 2022 05:46) (17 - 18)  SpO2: 95% (22 Aug 2022 05:46) (94% - 95%)    Parameters below as of 22 Aug 2022 05:46  Patient On (Oxygen Delivery Method): room air        ________________________________________________  PHYSICAL EXAM:  GENERAL: NAD  CHEST/LUNG: Clear to ausculitation bilaterally with good air entry   HEART: S1 S2  regular; no murmurs, gallops or rubs  ABDOMEN: Soft, Nontender, Nondistended; Bowel sounds present  EXTREMITIES: no cyanosis; no edema; no calf tenderness  SKIN: warm and dry; no rash  NERVOUS SYSTEM:  Awake and alert; Oriented  to place, person and time ; no new deficits    _________________________________________________  LABS:                        10.4   12.02 )-----------( 265      ( 22 Aug 2022 05:25 )             31.8     08-22    137  |  104  |  13  ----------------------------<  166<H>  4.0   |  22  |  0.66    Ca    8.8      22 Aug 2022 05:25  Phos  2.0     08-22  Mg     1.8     08-21          CAPILLARY BLOOD GLUCOSE      POCT Blood Glucose.: 163 mg/dL (22 Aug 2022 11:33)  POCT Blood Glucose.: 170 mg/dL (22 Aug 2022 07:45)  POCT Blood Glucose.: 154 mg/dL (21 Aug 2022 21:24)  POCT Blood Glucose.: 144 mg/dL (21 Aug 2022 16:30)        RADIOLOGY & ADDITIONAL TESTS:    Imaging Personally Reviewed:  YES/NO    Consultant(s) Notes Reviewed:   YES/ No    Care Discussed with Consultants :     Plan of care was discussed with patient and /or primary care giver; all questions and concerns were addressed and care was aligned with patient's wishes.

## 2022-08-22 NOTE — DISCHARGE NOTE PROVIDER - HOSPITAL COURSE
76 y.o. F with PMH L UPJ calculus s/p cysto, stent March 2021, HTN, DM, hypothyroid presents to [facility] ER c/o weakness, found to have a    Large left proximal ureteral calculus causing moderate hydronephrosis. Admitted for Urosepsis 2/2 obstructive uropathy   s/p stent placement on 8/17. Hospital course c/b GNR bacteremia, pt  has a positive urine culture for Proteus as well as   positive blood cultures for Proteus as well transferred to medicine service on 8/20.    ID on board pt being treated with Rocephin 2 g daily   segura discontinued, TOV ....    Pt was evaluated by PT with recommendation to be discharge home with home PT and rolling walker    76 y.o. F with PMH L UPJ calculus s/p cysto, stent March 2021, HTN, DM, hypothyroid presents to [facility] ER c/o weakness, found to have a    Large left proximal ureteral calculus causing moderate hydronephrosis. Admitted for Urosepsis 2/2 obstructive uropathy   s/p stent placement on 8/17. Hospital course c/b GNR bacteremia, pt  has a positive urine culture for Proteus as well. transferred to medicine service on 8/20.    ID on board pt being treated with Rocephin 2 g daily. ID recc: complete total 14 days Ceftin 500 mg BID from date of stent exchange which was on 8/17/22 on d/c.  segura discontinued, TOV passed. f/u outpt urology Dr. Willis. Plan discussed with   Dr. Narvaez, pt medically stable for d/c to [facility].  Pt was evaluated by PT with recommendation to be discharge [facility].      Please note that this a brief summary of hospital course please refer to daily progress notes and consult notes for full course and events

## 2022-08-22 NOTE — PROGRESS NOTE ADULT - ASSESSMENT
76 yoF s/p cysto, L ureteral stent placement for obstructing stone pod#4, urosepsis  afebrile, wbc wnl   Ucx/Bcx proteus mirab, on CTX; ID consulted    - continue abx, as per ID   - TOV today, please follow up post void residual and document. Strict I&O  - consult PT  - replete electrolytes   - care as per primary team  - d/w Dr. Willis

## 2022-08-22 NOTE — PROGRESS NOTE ADULT - SUBJECTIVE AND OBJECTIVE BOX
Surgery Progress Note     Subjective/24hour Events:   Patient seen and examined.   No acute events overnight. Afebrile.   Pain controlled. Continues to feel tired, but no specific pain.    Vital Signs:  Vital Signs Last 24 Hrs  T(C): 37.4 (22 Aug 2022 05:46), Max: 37.4 (22 Aug 2022 05:46)  T(F): 99.4 (22 Aug 2022 05:46), Max: 99.4 (22 Aug 2022 05:46)  HR: 84 (22 Aug 2022 05:46) (81 - 89)  BP: 123/74 (22 Aug 2022 05:46) (123/74 - 172/85)  BP(mean): 114 (21 Aug 2022 20:45) (114 - 114)  RR: 17 (22 Aug 2022 05:46) (17 - 18)  SpO2: 95% (22 Aug 2022 05:46) (94% - 98%)    Parameters below as of 22 Aug 2022 05:46  Patient On (Oxygen Delivery Method): room air        CAPILLARY BLOOD GLUCOSE      POCT Blood Glucose.: 170 mg/dL (22 Aug 2022 07:45)  POCT Blood Glucose.: 154 mg/dL (21 Aug 2022 21:24)  POCT Blood Glucose.: 144 mg/dL (21 Aug 2022 16:30)  POCT Blood Glucose.: 152 mg/dL (21 Aug 2022 11:34)      I&O's Detail    21 Aug 2022 07:01  -  22 Aug 2022 07:00  --------------------------------------------------------  IN:  Total IN: 0 mL    OUT:    Indwelling Catheter - Urethral (mL): 2500 mL  Total OUT: 2500 mL    Total NET: -2500 mL          MEDICATIONS  (STANDING):  aspirin enteric coated 81 milliGRAM(s) Oral daily  cefTRIAXone   IVPB 2000 milliGRAM(s) IV Intermittent every 24 hours  dextrose 5%. 1000 milliLiter(s) (50 mL/Hr) IV Continuous <Continuous>  dextrose 50% Injectable 25 Gram(s) IV Push once  enoxaparin Injectable 40 milliGRAM(s) SubCutaneous every 24 hours  gabapentin 300 milliGRAM(s) Oral three times a day  glucagon  Injectable 1 milliGRAM(s) IntraMuscular once  insulin lispro (ADMELOG) corrective regimen sliding scale   SubCutaneous at bedtime  insulin lispro (ADMELOG) corrective regimen sliding scale   SubCutaneous three times a day before meals  levothyroxine 100 MICROGram(s) Oral daily  losartan 50 milliGRAM(s) Oral daily  montelukast 10 milliGRAM(s) Oral daily  polyethylene glycol 3350 17 Gram(s) Oral two times a day  senna 2 Tablet(s) Oral at bedtime    MEDICATIONS  (PRN):  ALBUTerol    90 MICROgram(s) HFA Inhaler 2 Puff(s) Inhalation every 6 hours PRN Shortness of Breath and/or Wheezing  dextrose Oral Gel 15 Gram(s) Oral once PRN Blood Glucose LESS THAN 70 milliGRAM(s)/deciliter  ondansetron Injectable 4 milliGRAM(s) IV Push every 6 hours PRN Nausea      Physical Exam:  Gen: NAD.  Lungs: Non labored breathing.   Ab: Soft, nontender, nondistended. No CVAT  : Clear yellow urine, segura in place    Labs:    08-22    137  |  104  |  13  ----------------------------<  166<H>  4.0   |  22  |  0.66    Ca    8.8      22 Aug 2022 05:25  Phos  2.0     08-22  Mg     1.8     08-21                              10.4   12.02 )-----------( 265      ( 22 Aug 2022 05:25 )             31.8

## 2022-08-22 NOTE — PROGRESS NOTE ADULT - NS ATTEND AMEND GEN_ALL_CORE FT
Patient is a 76 y.o. F with PMH L UPJ calculus s/p cysto, stent March 2021, HTN, DM, hypothyroid presents to [facility] ER c/o weakness, found to have a  Large left proximal ureteral calculus causing moderate hydronephrosis. Admitted for Urosepsis 2/2 obstructive uropathy s/p stent placement on 8/17. Hospital course c/b GNR bacteremia being treated w/ Iv abx.     Patient was seen and examined, was sitting in chair this morning. Remains afebrile , has some leukocytosis       A/P:  #Urosepsis 2/ obstructive uropathy s/p stent placement   #Bacteremia   #Acute metabolic encephalopathy- infectious etiology - resolved   #Hypokalemia  #Hypophosphatemia   #Hypertension  #Hypothyroidism  #DM  #RICKEY- resolved     Plan:  -Pt remained afebrile for last 24 hrs, now w/ slight worsening of leukocytosis. c/w IV Rocephin, repeat blood cx NTD. Appreciate ID consult   -DC Juares cathter, TOV today.  -Having regular BM , c/w stool softeners   -Mental status at baseline  -BP stable, c/w losartan   -C/w levothyroxine   -OOBC, PT eval  -Discussed care plan w/ daughter over phone.

## 2022-08-22 NOTE — PROGRESS NOTE ADULT - ASSESSMENT
76 y.o. F with PMH L UPJ calculus s/p cysto, stent March 2021, HTN, DM, hypothyroid presents to [facility] ER c/o weakness, found to have a  Large left proximal ureteral calculus causing moderate hydronephrosis. Admitted for Urosepsis 2/2 obstructive uropathy s/p stent placement on 8/17. Hospital course c/b GNR bacteremia, pt  has a positive urine culture for Proteus as well as positive blood cultures for Proteus as well   transferred to medicine service on 8/20.    ID on board pt being treated with Rocephin 2 g daily   repeat blood culture 8/20 NTD   Pt seen at bedside, no new complaints, segura discontinued, TOV

## 2022-08-22 NOTE — DISCHARGE NOTE PROVIDER - NSDCCPCAREPLAN_GEN_ALL_CORE_FT
PRINCIPAL DISCHARGE DIAGNOSIS  Diagnosis: Acute UTI  Assessment and Plan of Treatment: You were found to have complicated  Urinary tract infection: urine culture grew  Proteus mirabilis   Also yourblood culture grew same bacteria. You were treated with IV antibiotic   INC      SECONDARY DISCHARGE DIAGNOSES  Diagnosis: Ureteral stone with hydronephrosis  Assessment and Plan of Treatment: PLease follow up with your urologist Dr Willis for further managment    Diagnosis: DM (diabetes mellitus)  Assessment and Plan of Treatment:     Diagnosis: HTN (hypertension)  Assessment and Plan of Treatment:      PRINCIPAL DISCHARGE DIAGNOSIS  Diagnosis: Bacteremia due to Proteus species  Assessment and Plan of Treatment:       SECONDARY DISCHARGE DIAGNOSES  Diagnosis: Acute UTI  Assessment and Plan of Treatment: You were found to have complicated  Urinary tract infection: urine culture grew  Proteus mirabilis   Also yourblood culture grew same bacteria. You were treated with IV antibiotic   INC    Diagnosis: Ureteral stone with hydronephrosis  Assessment and Plan of Treatment: PLease follow up with your urologist Dr Willis for further managment    Diagnosis: DM (diabetes mellitus)  Assessment and Plan of Treatment:     Diagnosis: HTN (hypertension)  Assessment and Plan of Treatment:      PRINCIPAL DISCHARGE DIAGNOSIS  Diagnosis: Gram-negative sepsis, unspecified  Assessment and Plan of Treatment: You presented to the Ed with elevated wbc, and fevers. You were diagnosed with sepsis,whic is the bodys extreme response to an infection. YOu were found to have a urinary tract infection which is the source of your sepsis. You were treated with IV fluis and antibiotics. You were seen by an infectious disease. PleaseTake all antibiotics as ordered ( Ceftin 500mg  twice a day until 8/31/22).  Call you Health care provider upon arrival home to make a one week follow up appointment.  If you develop fever, chills, malaise, or change in mental status call your Health Care Provider or go to the Emergency Department.  Nutrition is important, eat small frequent meals to help ensure you get adequate calories.  Do not stay in bed all day!  Increase your activity daily as tolerated.        SECONDARY DISCHARGE DIAGNOSES  Diagnosis: Ureteral stone with hydronephrosis  Assessment and Plan of Treatment: You were found to have a uretral stone. You were seen by urology and you had a custoscopy with stent insertion on 8/17/22. PLease follow up with your urologist Dr Willis for further managment.    Diagnosis: DM (diabetes mellitus)  Assessment and Plan of Treatment: You have a history of diabetes. You a1c was 6.2.  Continue to follow with your primary care MD or your endocrinologist. Discuss what the goal hemoglobin A1C level is for you.  Follow a heart healthy diabetic diet. If you check your fingerstick glucose at home, call your MD if it is greater than 250mg/dL on 2 occasions or less than 100mg/dL on 2 occasions. Know signs of low blood sugar, such as: dizziness, shakiness, sweating, confusion, hunger, nervousness- drink 4 ounces apple juice if occurs and call your doctor. Know early signs of high blood sugar, such as: frequent urination, increased thirst, blurry vision, fatigue, headache - call your doctor if this occurs.      Diagnosis: HTN (hypertension)  Assessment and Plan of Treatment: You have a history of high blood pressure. High blood pressure is a condition that puts you at risk for heart attack, stroke and kidney disease. Please continue to take your medications as prescribed. You can also help control your blood pressure by maintaining a healthy weight, eating a diet low in fat and rich in fruits and vegetables, reduce the amount of salt in your diet. Also, reduce alcohol and try to include some form of physical activity daily for at least 30 mins. Follow up with your medical doctor to establish long term blood pressure treatment goals.  Notify your doctor if you have any of the following symptoms:   Dizziness, Lightheadedness, Blurry vision, Headache, Chest pain, Shortness of breath      Diagnosis: Acute UTI  Assessment and Plan of Treatment: You were found to have complicated  Urinary tract infection: urine culture grew  Proteus mirabilis . YOu were treated with IV antibiotic. Please complete your full course of antibiotis until 8/31/22.   Take the antibiotics exactly as your caregiver instructs you. Finish the medication even if you feel better after you have only taken some of the medication.  Drink enough water and fluids to keep your urine clear or pale yellow.  Avoid caffeine, tea, and carbonated beverages. They tend to irritate your bladder.  Empty your bladder often. Avoid holding urine for long periods of time.  After a bowel movement, women should cleanse from front to back. Use each tissue only once.  SEEK MEDICAL CARE IF:  You have back pain.  You develop a fever.  Your symptoms do not begin to resolve within 3 days.  SEEK IMMEDIATE MEDICAL CARE IF:  You have severe back pain or lower abdominal pain.  You develop chills.  You have nausea or vomiting.  You have continued burning or discomfort with urination.      Diagnosis: RICKEY (acute kidney injury)  Assessment and Plan of Treatment:   Please continue hydration and follow up with your PCP.  - You presented with Elevated Creatinine   kidney injury likely multifactorial due to severe dehydration , Hypertension and diabetes.  The cause fo your kidney injury was mostly likley due to an obstructing urethral stone. You had a cystoscopy with stent placement on 8/17/22. You kidney fucntion has improved.  - Please continue oral hydration as much as possible within the daily drinking limit of 2 L per day  - You are medically stable to be discharged from the hospital.  - You need to follow up with your Primary Care Physician and Your Urologist to get blood work and check your Creatinine level  in 1 week.  - You need to continue your medications regularly as prescribed.      Diagnosis: Gram-negative bacteremia  Assessment and Plan of Treatment: bacteremia is the presence of bacteria in the bloodstream. Complete antibiotic course as infectious doctor recommended. If you have fever, chills, loss of appetite of nausea/ vomiting, call health care provider and follow the instruction.   follow up with your primary doctor in community within 2 weeks from discharge from hospital.       PRINCIPAL DISCHARGE DIAGNOSIS  Diagnosis: Gram-negative sepsis, unspecified  Assessment and Plan of Treatment: You presented to the ED with change in mental status and was note dto haveb elevated wbc, and fevers. You were diagnosed with sepsis,whic is the bodys extreme response to an infection. You was also noted to have obstruction to the flow of urine (Obstructive Uropathy) secondary to a large 1.5 cm stone in the ureter at UP junction. Due to this obstruction,   You were found to have a urinary tract infection which is the source of your sepsis. You were treated with IV fluids and antibiotics. You were seen by an infectious disease specialist Dr. Murray and was treated with intravenous antibiotics. You got 7 days IV here; Your Blood culture was positive for bacteria Proteus. Repeat Blood Cx after antibiotics form 8/20/22 remained negative at 4 days.  PleaseTake all antibiotics as ordered ( Ceftin 500mg  twice a day until 8/31/22).  Call you Health care provider upon arrival home to make a one week follow up appointment.  If you develop fever, chills, malaise, or change in mental status call your Health Care Provider or go to the Emergency Department.  Nutrition is important, eat small frequent meals to help ensure you get adequate calories.  Do not stay in bed all day!  Increase your activity daily as tolerated.        SECONDARY DISCHARGE DIAGNOSES  Diagnosis: Acute UTI  Assessment and Plan of Treatment: You were found to have complicated  Urinary tract infection: urine culture grew  Proteus mirabilis . YOu were treated with IV antibiotic. Please complete your full course of antibiotis until 8/31/22 as above  Take the antibiotics exactly as your caregiver instructs you. Finish the medication even if you feel better after you have only taken some of the medication.  Drink enough water and fluids to keep your urine clear or pale yellow.  Avoid caffeine, tea, and carbonated beverages. They tend to irritate your bladder.  Empty your bladder often. Avoid holding urine for long periods of time.  After a bowel movement, women should cleanse from front to back. Use each tissue only once.  SEEK MEDICAL CARE IF:  You have back pain.  You develop a fever.  Your symptoms do not begin to resolve within 3 days.  SEEK IMMEDIATE MEDICAL CARE IF:  You have severe back pain or lower abdominal pain.  You develop chills.  You have nausea or vomiting.  You have continued burning or discomfort with urination.      Diagnosis: Ureteral stone with hydronephrosis  Assessment and Plan of Treatment: You were found to have a uretral stone. You were seen by urology and you had a custoscopy with stent insertion on 8/17/22. PLease follow up with your urologist Dr Willis for further managment.  You have a hisotry of Kidney stones last year with infection in the urine and blood stream.   PLEASE FOLLOW UP WITH YOUR OUTPATIENT UROLOGIST AS ADVISED. YOU NEED PROCEDURE TO REMOVE THE STONE FOLLOWED BY STENT REMOVAL.  YOUR DAUGHTER HAS BEEN PROVIDED WITH INFORMATION FOR UROLOGY FOLLOW UP.   IF ANY CLINICAL WORSENING PLEASE RETURN TO ED    Diagnosis: Acute encephalopathy  Assessment and Plan of Treatment: You was noted to have acute encephalopathy on admission due to underlying infection and possible metabolic from dehydration and electrolyte imbalance. Encephalopathy resolved with treatment of underlying infection and Sepsis.    Diagnosis: DM (diabetes mellitus)  Assessment and Plan of Treatment: You have a history of diabetes. You a1c was 6.2.   Continue to follow with your primary care MD or your endocrinologist.   Your sugars are currently well controlled; will recommend continue with Insulin sliding scale for now. Discuss what the goal hemoglobin A1C level is for you.  Follow a heart healthy diabetic diet. If you check your fingerstick glucose at home, call your MD if it is greater than 250mg/dL on 2 occasions or less than 100mg/dL on 2 occasions. Know signs of low blood sugar, such as: dizziness, shakiness, sweating, confusion, hunger, nervousness- drink 4 ounces apple juice if occurs and call your doctor. Know early signs of high blood sugar, such as: frequent urination, increased thirst, blurry vision, fatigue, headache - call your doctor if this occurs.      Diagnosis: HTN (hypertension)  Assessment and Plan of Treatment: You have a history of high blood pressure. High blood pressure is a condition that puts you at risk for heart attack, stroke and kidney disease. Please continue to take your medications as prescribed. You can also help control your blood pressure by maintaining a healthy weight, eating a diet low in fat and rich in fruits and vegetables, reduce the amount of salt in your diet. Also, reduce alcohol and try to include some form of physical activity daily for at least 30 mins. Follow up with your medical doctor to establish long term blood pressure treatment goals.  Notify your doctor if you have any of the following symptoms:   Dizziness, Lightheadedness, Blurry vision, Headache, Chest pain, Shortness of breath      Diagnosis: RICKEY (acute kidney injury)  Assessment and Plan of Treatment:   Please continue hydration and follow up with your PCP.  - You presented with Elevated Creatinine   kidney injury likely multifactorial due to severe dehydration , Hypertension and diabetes.  The cause fo your kidney injury was mostly likley due to an obstructing urethral stone. You had a cystoscopy with stent placement on 8/17/22. You kidney fucntion has improved.  - Please continue oral hydration as much as possible within the daily drinking limit of 2 L per day  - You are medically stable to be discharged from the hospital.  - You need to follow up with your Primary Care Physician and Your Urologist to get blood work and check your Creatinine level  in 1 week.  - You need to continue your medications regularly as prescribed.      Diagnosis: Gram-negative bacteremia  Assessment and Plan of Treatment: bacteremia is the presence of bacteria in the bloodstream. Complete antibiotic course as infectious doctor recommended. If you have fever, chills, loss of appetite of nausea/ vomiting, call health care provider and follow the instruction.   follow up with your primary doctor in community within 2 weeks from discharge from hospital.       PRINCIPAL DISCHARGE DIAGNOSIS  Diagnosis: Gram-negative sepsis, unspecified  Assessment and Plan of Treatment: You presented to the ED with change in mental status and was note dto haveb elevated wbc, and fevers. You were diagnosed with sepsis,whic is the bodys extreme response to an infection. You was also noted to have obstruction to the flow of urine (Obstructive Uropathy) secondary to a large 1.5 cm stone in the ureter at UP junction. Due to this obstruction,   You were found to have a urinary tract infection which is the source of your sepsis. You were treated with IV fluids and antibiotics. You were seen by an infectious disease specialist Dr. Murray and was treated with intravenous antibiotics. You got 7 days IV here; Your Blood culture was positive for bacteria Proteus. Repeat Blood Cx after antibiotics form 8/20/22 remained negative at 4 days.  PleaseTake all antibiotics as ordered ( Ceftin 500mg  twice a day until 8/31/22).  Call you Health care provider upon arrival home to make a one week follow up appointment.  If you develop fever, chills, malaise, or change in mental status call your Health Care Provider or go to the Emergency Department.  Nutrition is important, eat small frequent meals to help ensure you get adequate calories.  Do not stay in bed all day!  Increase your activity daily as tolerated.        SECONDARY DISCHARGE DIAGNOSES  Diagnosis: Ureteral stone with hydronephrosis  Assessment and Plan of Treatment: You were found to have a uretral stone. You were seen by urology and you had a custoscopy with stent insertion on 8/17/22. PLease follow up with your urologist Dr Willis for further managment.  You have a hisotry of Kidney stones last year with infection in the urine and blood stream.   PLEASE FOLLOW UP WITH YOUR OUTPATIENT UROLOGIST AS ADVISED. YOU NEED PROCEDURE TO REMOVE THE STONE FOLLOWED BY STENT REMOVAL.  YOUR DAUGHTER HAS BEEN PROVIDED WITH INFORMATION FOR UROLOGY FOLLOW UP.   IF ANY CLINICAL WORSENING PLEASE RETURN TO ED    Diagnosis: DM (diabetes mellitus)  Assessment and Plan of Treatment: You have a history of diabetes. You a1c was 6.2. You were on Alogliptin, glimperide and metformin to manage your diabetes at home. Please continue your metformin in the rehab as your sugars have been controlled while in the hospital. consider resuming your alogliptin and glimperide if your sugars become elevated.  Continue to follow with your primary care MD or your endocrinologist.   Your sugars are currently well controlled; will recommend continue with Insulin sliding scale for now. Discuss what the goal hemoglobin A1C level is for you.  Follow a heart healthy diabetic diet. If you check your fingerstick glucose at home, call your MD if it is greater than 250mg/dL on 2 occasions or less than 100mg/dL on 2 occasions. Know signs of low blood sugar, such as: dizziness, shakiness, sweating, confusion, hunger, nervousness- drink 4 ounces apple juice if occurs and call your doctor. Know early signs of high blood sugar, such as: frequent urination, increased thirst, blurry vision, fatigue, headache - call your doctor if this occurs.      Diagnosis: HTN (hypertension)  Assessment and Plan of Treatment: You have a history of high blood pressure. High blood pressure is a condition that puts you at risk for heart attack, stroke and kidney disease. Please continue to take your medications as prescribed. You can also help control your blood pressure by maintaining a healthy weight, eating a diet low in fat and rich in fruits and vegetables, reduce the amount of salt in your diet. Also, reduce alcohol and try to include some form of physical activity daily for at least 30 mins. Follow up with your medical doctor to establish long term blood pressure treatment goals.  Notify your doctor if you have any of the following symptoms:   Dizziness, Lightheadedness, Blurry vision, Headache, Chest pain, Shortness of breath      Diagnosis: Acute UTI  Assessment and Plan of Treatment: You were found to have complicated  Urinary tract infection: urine culture grew  Proteus mirabilis . YOu were treated with IV antibiotic. Please complete your full course of antibiotis until 8/31/22 as above  Take the antibiotics exactly as your caregiver instructs you. Finish the medication even if you feel better after you have only taken some of the medication.  Drink enough water and fluids to keep your urine clear or pale yellow.  Avoid caffeine, tea, and carbonated beverages. They tend to irritate your bladder.  Empty your bladder often. Avoid holding urine for long periods of time.  After a bowel movement, women should cleanse from front to back. Use each tissue only once.  SEEK MEDICAL CARE IF:  You have back pain.  You develop a fever.  Your symptoms do not begin to resolve within 3 days.  SEEK IMMEDIATE MEDICAL CARE IF:  You have severe back pain or lower abdominal pain.  You develop chills.  You have nausea or vomiting.  You have continued burning or discomfort with urination.      Diagnosis: RICKEY (acute kidney injury)  Assessment and Plan of Treatment: Please continue hydration and follow up with your PCP.  - You presented with Elevated Creatinine   kidney injury likely multifactorial due to severe dehydration , Hypertension and diabetes.  The cause fo your kidney injury was mostly likley due to an obstructing urethral stone. You had a cystoscopy with stent placement on 8/17/22. You kidney fucntion has improved.  - Please continue oral hydration as much as possible within the daily drinking limit of 2 L per day  - You are medically stable to be discharged from the hospital.  - You need to follow up with your Primary Care Physician and Your Urologist to get blood work and check your Creatinine level  in 3-4 days.  - You need to continue your medications regularly as prescribed.      Diagnosis: Gram-negative bacteremia  Assessment and Plan of Treatment: bacteremia is the presence of bacteria in the bloodstream. Complete antibiotic course as infectious doctor recommended. If you have fever, chills, loss of appetite of nausea/ vomiting, call health care provider and follow the instruction.   follow up with your primary doctor in community within 2 weeks from discharge from hospital.      Diagnosis: Acute encephalopathy  Assessment and Plan of Treatment: You was noted to have acute encephalopathy on admission due to underlying infection and possible metabolic from dehydration and electrolyte imbalance. Encephalopathy resolved with treatment of underlying infection and Sepsis.     PRINCIPAL DISCHARGE DIAGNOSIS  Diagnosis: Gram-negative sepsis, unspecified  Assessment and Plan of Treatment: You presented to the ED with change in mental status and was note dto haveb elevated wbc, and fevers. You were diagnosed with sepsis,whic is the bodys extreme response to an infection. You was also noted to have obstruction to the flow of urine (Obstructive Uropathy) secondary to a large 1.5 cm stone in the ureter at UP junction. Due to this obstruction,   You were found to have a urinary tract infection which is the source of your sepsis. You were treated with IV fluids and antibiotics. You were seen by an infectious disease specialist Dr. Murray and was treated with intravenous antibiotics. You got 7 days IV here; Your Blood culture was positive for bacteria Proteus. Repeat Blood Cx after antibiotics form 8/20/22 remained negative at 4 days.  PleaseTake all antibiotics as ordered ( Ceftin 500mg  twice a day until 8/31/22).  Call you Health care provider upon arrival home to make a one week follow up appointment.  If you develop fever, chills, malaise, or change in mental status call your Health Care Provider or go to the Emergency Department.  Nutrition is important, eat small frequent meals to help ensure you get adequate calories.  Do not stay in bed all day!  Increase your activity daily as tolerated.        SECONDARY DISCHARGE DIAGNOSES  Diagnosis: Acute UTI  Assessment and Plan of Treatment: You were found to have complicated  Urinary tract infection: urine culture grew  Proteus mirabilis . YOu were treated with IV antibiotic. Please complete your full course of antibiotis until 8/31/22 as above  Take the antibiotics exactly as your caregiver instructs you. Finish the medication even if you feel better after you have only taken some of the medication.  Drink enough water and fluids to keep your urine clear or pale yellow.  Avoid caffeine, tea, and carbonated beverages. They tend to irritate your bladder.  Empty your bladder often. Avoid holding urine for long periods of time.  After a bowel movement, women should cleanse from front to back. Use each tissue only once.  SEEK MEDICAL CARE IF:  You have back pain.  You develop a fever.  Your symptoms do not begin to resolve within 3 days.  SEEK IMMEDIATE MEDICAL CARE IF:  You have severe back pain or lower abdominal pain.  You develop chills.  You have nausea or vomiting.  You have continued burning or discomfort with urination.      Diagnosis: Ureteral stone with hydronephrosis  Assessment and Plan of Treatment: You were found to have a uretral stone. You were seen by urology and you had a custoscopy with stent insertion on 8/17/22. PLease follow up with your urologist Dr Willis for further managment.  You have a hisotry of Kidney stones last year with infection in the urine and blood stream.   PLEASE FOLLOW UP WITH YOUR OUTPATIENT UROLOGIST AS ADVISED. YOU NEED PROCEDURE TO REMOVE THE STONE FOLLOWED BY STENT REMOVAL.  YOUR DAUGHTER HAS BEEN PROVIDED WITH INFORMATION FOR UROLOGY FOLLOW UP.   IF ANY CLINICAL WORSENING PLEASE RETURN TO ED    Diagnosis: Acute encephalopathy  Assessment and Plan of Treatment: You was noted to have acute encephalopathy on admission due to underlying infection and possible metabolic from dehydration and electrolyte imbalance. Encephalopathy resolved with treatment of underlying infection and Sepsis.    Diagnosis: DM (diabetes mellitus)  Assessment and Plan of Treatment: You have a history of diabetes. You a1c was 6.2. You were on Alogliptin, glimperide and metformin to manage your diabetes at home.   Your sufgars are too tightly controlled and risk of hypoglycemia.   Please continue your metformin in the rehab as your sugars have been controlled while in the hospital. consider resuming your alogliptin first if sugars elevated. Please note that Glimepiride has higher propensitiy to cause low sugars will hold for now.   Continue to follow with your primary care MD or your endocrinologist.   Your sugars are currently well controlled; will recommend continue with Insulin sliding scale for now. Discuss what the goal hemoglobin A1C level is for you.  Follow a heart healthy diabetic diet. If you check your fingerstick glucose at home, call your MD if it is greater than 250mg/dL on 2 occasions or less than 100mg/dL on 2 occasions. Know signs of low blood sugar, such as: dizziness, shakiness, sweating, confusion, hunger, nervousness- drink 4 ounces apple juice if occurs and call your doctor. Know early signs of high blood sugar, such as: frequent urination, increased thirst, blurry vision, fatigue, headache - call your doctor if this occurs.      Diagnosis: HTN (hypertension)  Assessment and Plan of Treatment: You have a history of high blood pressure. High blood pressure is a condition that puts you at risk for heart attack, stroke and kidney disease. Please continue to take your medications as prescribed. You can also help control your blood pressure by maintaining a healthy weight, eating a diet low in fat and rich in fruits and vegetables, reduce the amount of salt in your diet. Also, reduce alcohol and try to include some form of physical activity daily for at least 30 mins. Follow up with your medical doctor to establish long term blood pressure treatment goals.  Notify your doctor if you have any of the following symptoms:   Dizziness, Lightheadedness, Blurry vision, Headache, Chest pain, Shortness of breath      Diagnosis: RICKEY (acute kidney injury)  Assessment and Plan of Treatment: Please continue hydration and follow up with your PCP.  - You presented with Elevated Creatinine   kidney injury likely multifactorial due to severe dehydration , Hypertension and diabetes.  The cause fo your kidney injury was mostly likley due to an obstructing urethral stone. You had a cystoscopy with stent placement on 8/17/22. You kidney fucntion has improved.  - Please continue oral hydration as much as possible within the daily drinking limit of 2 L per day  - You are medically stable to be discharged from the hospital.  - You need to follow up with your Primary Care Physician and Your Urologist to get blood work and check your Creatinine level  in 3-4 days.  - You need to continue your medications regularly as prescribed.      Diagnosis: Gram-negative bacteremia  Assessment and Plan of Treatment: bacteremia is the presence of bacteria in the bloodstream. Complete antibiotic course as infectious doctor recommended. If you have fever, chills, loss of appetite of nausea/ vomiting, call health care provider and follow the instruction.   follow up with your primary doctor in community within 2 weeks from discharge from hospital.      Diagnosis: Hypothyroid  Assessment and Plan of Treatment: You have hisotry of Hypothyroidism. Continue Synthroid 100 microgm daily. Adjust as per TSH.

## 2022-08-22 NOTE — DISCHARGE NOTE PROVIDER - CARE PROVIDERS DIRECT ADDRESSES
[email or web address]

## 2022-08-22 NOTE — DISCHARGE NOTE PROVIDER - NSDCMRMEDTOKEN_GEN_ALL_CORE_FT
Alogliptin 25 mg oral tablet: 1 tab(s) orally once a day  aspirin 81 mg oral delayed release tablet: 1 tab(s) orally once a day  atorvastatin 10 mg oral tablet: 1 tab(s) orally once a day  budesonide:   cefuroxime 500 mg oral tablet: 1 tab(s) orally 2 times a day   ferrous sulfate 325 mg (65 mg elemental iron) oral tablet: 1 tab(s) orally once a day  gabapentin 300 mg oral tablet: 1 tab(s) orally 3 times a day  glimepiride 2 mg oral tablet: 1 tab(s) orally once a day  levothyroxine 100 mcg (0.1 mg) oral tablet: 1 tab(s) orally once a day  loratadine 10 mg oral tablet: 1 tab(s) orally once a day  losartan 50 mg oral tablet: 1 tab(s) orally once a day  metFORMIN 500 mg oral tablet: 1 tab(s) orally 2 times a day  montelukast 10 mg oral tablet: 1 tab(s) orally once a day  senna 8.6 mg oral tablet: 1 tab(s) orally once a day (at bedtime)   Ventolin HFA 90 mcg/inh inhalation aerosol: 2 puff(s) inhaled every 6 hours, As Needed  Vitamin D2 50,000 intl units (1.25 mg) oral capsule: 1 cap(s) orally once a week   albuterol 90 mcg/inh inhalation aerosol: 2 puff(s) inhaled every 6 hours, As needed, Shortness of Breath and/or Wheezing  Alogliptin 25 mg oral tablet: 1 tab(s) orally once a day  aspirin 81 mg oral delayed release tablet: 1 tab(s) orally once a day  atorvastatin 10 mg oral tablet: 1 tab(s) orally once a day  budesonide:   cefuroxime 500 mg oral tablet: 1 tab(s) orally 2 times a day until 8/31/22  ferrous sulfate 325 mg (65 mg elemental iron) oral tablet: 1 tab(s) orally once a day  gabapentin 300 mg oral tablet: 1 tab(s) orally 3 times a day  glimepiride 2 mg oral tablet: 1 tab(s) orally once a day  levothyroxine 100 mcg (0.1 mg) oral tablet: 1 tab(s) orally once a day  loratadine 10 mg oral tablet: 1 tab(s) orally once a day  losartan 50 mg oral tablet: 1 tab(s) orally once a day  metFORMIN 500 mg oral tablet: 1 tab(s) orally 2 times a day  montelukast 10 mg oral tablet: 1 tab(s) orally once a day  senna leaf extract oral tablet: 2 tab(s) orally once a day (at bedtime)  Vitamin D2 50,000 intl units (1.25 mg) oral capsule: 1 cap(s) orally once a week   albuterol 90 mcg/inh inhalation aerosol: 2 puff(s) inhaled every 6 hours, As needed, Shortness of Breath and/or Wheezing  aspirin 81 mg oral delayed release tablet: 1 tab(s) orally once a day  atorvastatin 10 mg oral tablet: 1 tab(s) orally once a day  budesonide:   cefuroxime 500 mg oral tablet: 1 tab(s) orally 2 times a day until 8/31/22  ferrous sulfate 325 mg (65 mg elemental iron) oral tablet: 1 tab(s) orally once a day  gabapentin 300 mg oral tablet: 1 tab(s) orally 3 times a day  levothyroxine 100 mcg (0.1 mg) oral tablet: 1 tab(s) orally once a day  loratadine 10 mg oral tablet: 1 tab(s) orally once a day  losartan 50 mg oral tablet: 1 tab(s) orally once a day  metFORMIN 500 mg oral tablet: 1 tab(s) orally 2 times a day  montelukast 10 mg oral tablet: 1 tab(s) orally once a day (at bedtime)  senna leaf extract oral tablet: 2 tab(s) orally once a day (at bedtime)  Vitamin D2 50,000 intl units (1.25 mg) oral capsule: 1 cap(s) orally once a week

## 2022-08-23 LAB
ANION GAP SERPL CALC-SCNC: 10 MMOL/L — SIGNIFICANT CHANGE UP (ref 5–17)
BUN SERPL-MCNC: 13 MG/DL — SIGNIFICANT CHANGE UP (ref 7–18)
CALCIUM SERPL-MCNC: 9.2 MG/DL — SIGNIFICANT CHANGE UP (ref 8.4–10.5)
CHLORIDE SERPL-SCNC: 102 MMOL/L — SIGNIFICANT CHANGE UP (ref 96–108)
CO2 SERPL-SCNC: 24 MMOL/L — SIGNIFICANT CHANGE UP (ref 22–31)
CREAT SERPL-MCNC: 0.76 MG/DL — SIGNIFICANT CHANGE UP (ref 0.5–1.3)
EGFR: 82 ML/MIN/1.73M2 — SIGNIFICANT CHANGE UP
GLUCOSE BLDC GLUCOMTR-MCNC: 127 MG/DL — HIGH (ref 70–99)
GLUCOSE BLDC GLUCOMTR-MCNC: 176 MG/DL — HIGH (ref 70–99)
GLUCOSE BLDC GLUCOMTR-MCNC: 178 MG/DL — HIGH (ref 70–99)
GLUCOSE BLDC GLUCOMTR-MCNC: 206 MG/DL — HIGH (ref 70–99)
GLUCOSE SERPL-MCNC: 172 MG/DL — HIGH (ref 70–99)
HCT VFR BLD CALC: 33.8 % — LOW (ref 34.5–45)
HGB BLD-MCNC: 11 G/DL — LOW (ref 11.5–15.5)
MCHC RBC-ENTMCNC: 27.9 PG — SIGNIFICANT CHANGE UP (ref 27–34)
MCHC RBC-ENTMCNC: 32.5 GM/DL — SIGNIFICANT CHANGE UP (ref 32–36)
MCV RBC AUTO: 85.8 FL — SIGNIFICANT CHANGE UP (ref 80–100)
NRBC # BLD: 0 /100 WBCS — SIGNIFICANT CHANGE UP (ref 0–0)
PLATELET # BLD AUTO: 327 K/UL — SIGNIFICANT CHANGE UP (ref 150–400)
POTASSIUM SERPL-MCNC: 4 MMOL/L — SIGNIFICANT CHANGE UP (ref 3.5–5.3)
POTASSIUM SERPL-SCNC: 4 MMOL/L — SIGNIFICANT CHANGE UP (ref 3.5–5.3)
RBC # BLD: 3.94 M/UL — SIGNIFICANT CHANGE UP (ref 3.8–5.2)
RBC # FLD: 13 % — SIGNIFICANT CHANGE UP (ref 10.3–14.5)
SODIUM SERPL-SCNC: 136 MMOL/L — SIGNIFICANT CHANGE UP (ref 135–145)
TSH SERPL-MCNC: 8.81 UU/ML — HIGH (ref 0.34–4.82)
WBC # BLD: 11.67 K/UL — HIGH (ref 3.8–10.5)
WBC # FLD AUTO: 11.67 K/UL — HIGH (ref 3.8–10.5)

## 2022-08-23 PROCEDURE — 99233 SBSQ HOSP IP/OBS HIGH 50: CPT

## 2022-08-23 RX ADMIN — GABAPENTIN 300 MILLIGRAM(S): 400 CAPSULE ORAL at 21:22

## 2022-08-23 RX ADMIN — Medication 0: at 21:22

## 2022-08-23 RX ADMIN — Medication 2: at 08:06

## 2022-08-23 RX ADMIN — Medication 100 MICROGRAM(S): at 05:47

## 2022-08-23 RX ADMIN — ENOXAPARIN SODIUM 40 MILLIGRAM(S): 100 INJECTION SUBCUTANEOUS at 05:46

## 2022-08-23 RX ADMIN — POLYETHYLENE GLYCOL 3350 17 GRAM(S): 17 POWDER, FOR SOLUTION ORAL at 17:46

## 2022-08-23 RX ADMIN — POLYETHYLENE GLYCOL 3350 17 GRAM(S): 17 POWDER, FOR SOLUTION ORAL at 05:46

## 2022-08-23 RX ADMIN — CEFTRIAXONE 100 MILLIGRAM(S): 500 INJECTION, POWDER, FOR SOLUTION INTRAMUSCULAR; INTRAVENOUS at 17:47

## 2022-08-23 RX ADMIN — GABAPENTIN 300 MILLIGRAM(S): 400 CAPSULE ORAL at 14:35

## 2022-08-23 RX ADMIN — MONTELUKAST 10 MILLIGRAM(S): 4 TABLET, CHEWABLE ORAL at 12:11

## 2022-08-23 RX ADMIN — GABAPENTIN 300 MILLIGRAM(S): 400 CAPSULE ORAL at 05:47

## 2022-08-23 RX ADMIN — LOSARTAN POTASSIUM 50 MILLIGRAM(S): 100 TABLET, FILM COATED ORAL at 05:47

## 2022-08-23 RX ADMIN — Medication 2: at 12:09

## 2022-08-23 RX ADMIN — Medication 81 MILLIGRAM(S): at 12:11

## 2022-08-23 RX ADMIN — SENNA PLUS 2 TABLET(S): 8.6 TABLET ORAL at 21:23

## 2022-08-23 NOTE — PROGRESS NOTE ADULT - NS ATTEND AMEND GEN_ALL_CORE FT
Patient seen and examined this morning with Niece at bedside; spoke with daughter over the phone.    76 y.o. F with PMH L UPJ calculus s/p cysto, stent March 2021, HTN, DM, Hypothyroid presents to [facility] ER c/o weakness, found to have a  Large left proximal ureteral calculus causing moderate hydronephrosis. Admitted for Urosepsis 2/2 obstructive uropathy s/p stent placement on 8/1722 on the day of admission. Hospital course complicated by Gram negative bacteremia (Proteus Mirabilis) being treated with Iv abx.     Patient was noted to be sleeping comfortably; easily arousable; denied any abdominal pain; report feeling better but as per patient and daughter needing increasing assistance to get out of bed or going to bathroom. Daughter concerned about patient ability to be home alone as she is working and patient has Home Health aide only for few hrs a week.     Vital Signs Last 24 Hrs  T(C): 36.5 (23 Aug 2022 14:34), Max: 37 (23 Aug 2022 06:01)  T(F): 97.7 (23 Aug 2022 14:34), Max: 98.6 (23 Aug 2022 06:01)  HR: 79 (23 Aug 2022 14:34) (76 - 83)  BP: 134/60 (23 Aug 2022 14:34) (127/76 - 141/56)  BP(mean): 78 (22 Aug 2022 21:26) (78 - 78)  RR: 18 (23 Aug 2022 14:34) (18 - 18)  SpO2: 98% (23 Aug 2022 14:34) (96% - 98%) room air    P/E: as above  elderly female comfortable at rest  Psych: AAO x3  Neuro: No gross focal deficits; Power and sensation intact  CVS: S1S2 present, regular, no edema  Resp: BLAE+, No wheeze or Rhonchi  GI: Soft, BS+, scaphoid abdomen,  Non tender, non distended  Extr: No  calf tenderness B/L Lower extremities  Skin: Warm and moist without any rashes    Labs:                    11.0   11.67 )-----------( 327      ( 23 Aug 2022 06:56 )             33.8   08-23    136  |  102  |  13  ----------------------------<  172<H>  4.0   |  24  |  0.76    Ca    9.2      23 Aug 2022 06:56  Phos  2.0     08-22    A/P:  Sepsis with complicated UTI due to obstructive uropathy s/p stent placement   Bacteremia with Proteus pansensitive  #Acute metabolic encephalopathy- infectious etiology - resolved   Electrolyte imbalances: Hypokalemia/ Hypophosphatemia likely poor oral intake  Hypertension controlled  Hx Hypothyroidism   Type  2 DM  #RICKEY- resolved     Plan:  Continue IV ceftriaxone  Repeat blood Cx from 8/20/22 remain negative. ID f/u; d/w Dr. Murray will complete total 14 days Ceftin 500 mg BID form date of stent exchange  TOV successful; Voiding freely; Bladder scan this afternoon no residual noted as per RN   d/w Urology Dr. Willis; Patient will need definitive Urological intervention  possibly Lithotripsy; Patient should follow up outpatient Urology with her prior Urologist if feasible.   Patient was encouraged through daughter to increase oral fluid intake; Patient also should make Dietary adjustment to minimize renal calculi    -Having regular BM , c/w stool softeners   -Mental status at baseline  -BP stable, c/w losartan   -C/w levothyroxine   -OOBC, PT eval  -Discussed care plan w/ daughter over phone. Patient seen and examined this morning with Niece at bedside; spoke with daughter over the phone.    76 y.o. F with PMH L UPJ calculus s/p cysto, stent March 2021, HTN, DM, Hypothyroid presents to [facility] ER c/o weakness, found to have a  Large left proximal ureteral calculus causing moderate hydronephrosis. Admitted for Urosepsis 2/2 obstructive uropathy s/p stent placement on 8/1722 on the day of admission. Hospital course complicated by Gram negative bacteremia (Proteus Mirabilis) being treated with Iv abx.     Patient was noted to be sleeping comfortably; easily arousable; denied any abdominal pain; report feeling better but as per patient and daughter needing increasing assistance to get out of bed or going to bathroom. Daughter concerned about patient ability to be home alone as she is working and patient has Home Health aide only for few hrs a week.     Vital Signs Last 24 Hrs  T(C): 36.5 (23 Aug 2022 14:34), Max: 37 (23 Aug 2022 06:01)  T(F): 97.7 (23 Aug 2022 14:34), Max: 98.6 (23 Aug 2022 06:01)  HR: 79 (23 Aug 2022 14:34) (76 - 83)  BP: 134/60 (23 Aug 2022 14:34) (127/76 - 141/56)  BP(mean): 78 (22 Aug 2022 21:26) (78 - 78)  RR: 18 (23 Aug 2022 14:34) (18 - 18)  SpO2: 98% (23 Aug 2022 14:34) (96% - 98%) room air    P/E: as above  elderly female comfortable at rest  Psych: AAO x3  Neuro: No gross focal deficits; Power and sensation intact  CVS: S1S2 present, regular, no edema  Resp: BLAE+, No wheeze or Rhonchi  GI: Soft, BS+, scaphoid abdomen,  Non tender, non distended  Extr: No  calf tenderness B/L Lower extremities  Skin: Warm and moist without any rashes    Labs:                    11.0   11.67 )-----------( 327      ( 23 Aug 2022 06:56 )             33.8   08-23    136  |  102  |  13  ----------------------------<  172<H>  4.0   |  24  |  0.76    Ca    9.2      23 Aug 2022 06:56  Phos  2.0     08-22    A/P:  Sepsis with complicated UTI due to obstructive uropathy s/p stent placement   Bacteremia with Proteus pansensitive  #Acute metabolic encephalopathy- infectious etiology - resolved   Electrolyte imbalances: Hypokalemia/ Hypophosphatemia likely poor oral intake  Hypertension controlled  Hx Hypothyroidism   Type  2 DM  #RICKEY- resolved     Plan:  Continue IV ceftriaxone  Repeat blood Cx from 8/20/22 remain negative. ID f/u; d/w Dr. Murray will complete total 14 days Ceftin 500 mg BID form date of stent exchange  TOV successful; Voiding freely; Bladder scan this afternoon no residual noted as per RN   d/w Urology Dr. Willis; Patient will need definitive Urological intervention  possibly Lithotripsy; Patient should follow up outpatient Urology with her prior Urologist if feasible.   Continue stool softeners and levothyroxine   Continue Losartan     Patient was encouraged through daughter to increase oral fluid intake; Patient also should make Dietary adjustment to minimize renal calculi  Spoke with PT, follow up requested; As per PT d/w Noemi Patient may benefit form short term rehab for 1-2 weeks   Given her functional decline over past week or so, may benefit form increased Home health aide hours if discharged home at least while daughter is away at work unless social support system available form family and friends; d/w daughter  Discussed with  Lissa; f/u daughter for [employer]  Patient is otherwise medically stable for discharge     Discussed care plan with daughter over phone.  Discussed with FUNMILAYO Middleton and RN Patient seen and examined this morning with Niece at bedside; spoke with daughter over the phone.    76 y.o. F with PMH L UPJ calculus s/p cysto, stent March 2021, HTN, DM, Hypothyroid presents to [facility] ER c/o weakness, found to have a  Large left proximal ureteral calculus causing moderate hydronephrosis. Admitted for Urosepsis 2/2 obstructive uropathy s/p stent placement on 8/1722 on the day of admission. Hospital course complicated by Gram negative bacteremia (Proteus Mirabilis) being treated with Iv abx.     Patient was noted to be sleeping comfortably; easily arousable; denied any abdominal pain; report feeling better but as per patient and daughter needing increasing assistance to get out of bed or going to bathroom. Daughter concerned about patient ability to be home alone as she is working and patient has Home Health aide only for few hrs a week.     Vital Signs Last 24 Hrs  T(C): 36.5 (23 Aug 2022 14:34), Max: 37 (23 Aug 2022 06:01)  T(F): 97.7 (23 Aug 2022 14:34), Max: 98.6 (23 Aug 2022 06:01)  HR: 79 (23 Aug 2022 14:34) (76 - 83)  BP: 134/60 (23 Aug 2022 14:34) (127/76 - 141/56)  BP(mean): 78 (22 Aug 2022 21:26) (78 - 78)  RR: 18 (23 Aug 2022 14:34) (18 - 18)  SpO2: 98% (23 Aug 2022 14:34) (96% - 98%) room air    P/E: as above  elderly female comfortable at rest  Psych: AAO x3  Neuro: No gross focal deficits; Power and sensation intact  CVS: S1S2 present, regular, no edema  Resp: BLAE+, No wheeze or Rhonchi  GI: Soft, BS+, scaphoid abdomen,  Non tender, non distended  Extr: No  calf tenderness B/L Lower extremities  Skin: Warm and moist without any rashes    Labs:                    11.0   11.67 )-----------( 327      ( 23 Aug 2022 06:56 )             33.8   08-23    136  |  102  |  13  ----------------------------<  172<H>  4.0   |  24  |  0.76    Ca    9.2      23 Aug 2022 06:56  Phos  2.0     08-22    A/P:  Sepsis with complicated UTI due to obstructive uropathy s/p stent placement   Bacteremia with Proteus pansensitive  Acute encephalopathy- infectious and metabolic etiology - resolved   Electrolyte imbalances: Hypokalemia/ Hypophosphatemia likely poor oral intake  Hypertension controlled  Hx Hypothyroidism   Type  2 DM  #RICKEY- resolved     Plan:  Continue IV ceftriaxone  Repeat blood Cx from 8/20/22 remain negative. ID f/u; d/w Dr. Murray will complete total 14 days Ceftin 500 mg BID form date of stent exchange  TOV successful; Voiding freely; Bladder scan this afternoon no residual noted as per RN   d/w Urology Dr. Willis; Patient will need definitive Urological intervention  possibly Lithotripsy; Patient should follow up outpatient Urology with her prior Urologist if feasible.   Continue stool softeners and levothyroxine   Continue Losartan     Patient was encouraged through daughter to increase oral fluid intake; Patient also should make Dietary adjustment to minimize renal calculi  Spoke with PT, follow up requested; As per PT d/w Noemi Patient may benefit form short term rehab for 1-2 weeks   Given her functional decline over past week or so, may benefit form increased Home health aide hours if discharged home at least while daughter is away at work unless social support system available form family and friends; d/w daughter  Discussed with  Lissa; f/u daughter for [employer]  Patient is otherwise medically stable for discharge     Discussed care plan with daughter over phone.  Discussed with FUNMILAYO Middleton and RN

## 2022-08-23 NOTE — PROGRESS NOTE ADULT - ASSESSMENT
76 y.o. F with PMH L UPJ calculus s/p cysto, stent March 2021, HTN, DM, hypothyroid presents to [facility] ER c/o weakness, found to have a  Large left proximal ureteral calculus causing moderate hydronephrosis. Admitted for Urosepsis 2/2 obstructive uropathy s/p stent placement on 8/17. Hospital course c/b GNR bacteremia, pt  has a positive urine culture for Proteus as well as positive blood cultures for Proteus as well   transferred to medicine service on 8/20.  ID on board pt being treated with Rocephin 2 g daily. Repeat blood culture 8/20 NGTD. Pt seen at bedside, no new complaints, segura discontinued, TOV.  Patient examined sitting in NAD, denies dysuria. Voiding freely. Patient is stable for discharge. Pending final ABX plan from ID. PT reconsulted & recommends JOSHUA. Pending choices.                  76 y.o. F with PMH L UPJ calculus s/p cysto, stent March 2021, HTN, DM, hypothyroid presents to [facility] ER c/o weakness, found to have a  Large left proximal ureteral calculus causing moderate hydronephrosis. Admitted for Urosepsis 2/2 obstructive uropathy s/p stent placement on 8/17. Hospital course c/b GNR bacteremia, pt  has a positive urine culture for Proteus as well as positive blood cultures for Proteus as well   transferred to medicine service on 8/20.  ID on board pt being treated with Rocephin 2 g daily. Repeat blood culture 8/20 NGTD. Pt seen at bedside, no new complaints, segura discontinued, TOV.  Patient examined sitting in NAD, denies dysuria. Voiding freely. Will f/u with out patient urology for stent management. Patient is stable for discharge. Pending final ABX plan from ID. PT reconsulted & recommends JOSHUA. Pending choices.

## 2022-08-23 NOTE — PROGRESS NOTE ADULT - SUBJECTIVE AND OBJECTIVE BOX
NP Note discussed with  primary attending    Patient is a 76y old  Female who presents with a chief complaint of Sepsis secondary to UTI, Left ureteral calculus (22 Aug 2022 16:05)      INTERVAL HPI/OVERNIGHT EVENTS: no new complaints    MEDICATIONS  (STANDING):  aspirin enteric coated 81 milliGRAM(s) Oral daily  cefTRIAXone   IVPB 2000 milliGRAM(s) IV Intermittent every 24 hours  dextrose 5%. 1000 milliLiter(s) (50 mL/Hr) IV Continuous <Continuous>  dextrose 50% Injectable 25 Gram(s) IV Push once  enoxaparin Injectable 40 milliGRAM(s) SubCutaneous every 24 hours  gabapentin 300 milliGRAM(s) Oral three times a day  glucagon  Injectable 1 milliGRAM(s) IntraMuscular once  insulin lispro (ADMELOG) corrective regimen sliding scale   SubCutaneous three times a day before meals  insulin lispro (ADMELOG) corrective regimen sliding scale   SubCutaneous at bedtime  levothyroxine 100 MICROGram(s) Oral daily  losartan 50 milliGRAM(s) Oral daily  montelukast 10 milliGRAM(s) Oral daily  polyethylene glycol 3350 17 Gram(s) Oral two times a day  senna 2 Tablet(s) Oral at bedtime    MEDICATIONS  (PRN):  ALBUTerol    90 MICROgram(s) HFA Inhaler 2 Puff(s) Inhalation every 6 hours PRN Shortness of Breath and/or Wheezing  dextrose Oral Gel 15 Gram(s) Oral once PRN Blood Glucose LESS THAN 70 milliGRAM(s)/deciliter  ondansetron Injectable 4 milliGRAM(s) IV Push every 6 hours PRN Nausea      __________________________________________________  REVIEW OF SYSTEMS:    CONSTITUTIONAL: No fever,   EYES: no acute visual disturbances  NECK: No pain or stiffness  RESPIRATORY: No cough; No shortness of breath  CARDIOVASCULAR: No chest pain, no palpitations  GASTROINTESTINAL: No pain. No nausea or vomiting; No diarrhea   NEUROLOGICAL: No headache or numbness, no tremors  MUSCULOSKELETAL: No joint pain, no muscle pain  GENITOURINARY: no dysuria, no frequency, no hesitancy  PSYCHIATRY: no depression , no anxiety  ALL OTHER  ROS negative        Vital Signs Last 24 Hrs  T(C): 36.5 (23 Aug 2022 14:34), Max: 37 (23 Aug 2022 06:01)  T(F): 97.7 (23 Aug 2022 14:34), Max: 98.6 (23 Aug 2022 06:01)  HR: 79 (23 Aug 2022 14:34) (76 - 83)  BP: 134/60 (23 Aug 2022 14:34) (127/76 - 141/56)  BP(mean): 78 (22 Aug 2022 21:26) (78 - 78)  RR: 18 (23 Aug 2022 14:34) (18 - 18)  SpO2: 98% (23 Aug 2022 14:34) (96% - 98%)    Parameters below as of 23 Aug 2022 14:34  Patient On (Oxygen Delivery Method): room air        ________________________________________________  PHYSICAL EXAM:  GENERAL: NAD  HEENT: Normocephalic;  conjunctivae and sclerae clear; moist mucous membranes;   NECK : supple  CHEST/LUNG: Clear to ausculitation bilaterally with good air entry   HEART: S1 S2  regular; no murmurs, gallops or rubs  ABDOMEN: Soft, Nontender, Nondistended; Bowel sounds present  EXTREMITIES: no cyanosis; no edema; no calf tenderness  SKIN: warm and dry; no rash  NERVOUS SYSTEM:  Awake and alert; Oriented  to place, person and time ; no new deficits    _________________________________________________  LABS:                        11.0   11.67 )-----------( 327      ( 23 Aug 2022 06:56 )             33.8     08-23    136  |  102  |  13  ----------------------------<  172<H>  4.0   |  24  |  0.76    Ca    9.2      23 Aug 2022 06:56  Phos  2.0     08-22          CAPILLARY BLOOD GLUCOSE      POCT Blood Glucose.: 127 mg/dL (23 Aug 2022 16:26)  POCT Blood Glucose.: 178 mg/dL (23 Aug 2022 11:54)  POCT Blood Glucose.: 176 mg/dL (23 Aug 2022 08:00)  POCT Blood Glucose.: 236 mg/dL (22 Aug 2022 21:21)        RADIOLOGY & ADDITIONAL TESTS:  < from: CT Abdomen and Pelvis w/ IV Cont (08.17.22 @ 10:35) >  PROCEDURE:  CT of the Abdomen and Pelvis was performed.  Sagittal and coronal reformats were performed.    FINDINGS:  LOWER CHEST: Within normal limits.    LIVER: Steatosis.  BILE DUCTS: Normal caliber.  GALLBLADDER: Cholecystectomy.  SPLEEN: Within normal limits.  PANCREAS: Within normal limits.  ADRENALS: Within normal limits.  KIDNEYS/URETERS: Left renal cyst. Left-sided perinephric stranding and   moderate hydronephrosis due to a proximal ureteral stone measuring 0.7 x   1.7 cm.    BLADDER: Poorly evaluated due to artifact from the patient's right hip   arthroplasty.  REPRODUCTIVE ORGANS: Hysterectomy. The ovaries appear to be present and   are unremarkable. Please correlate with surgical history.    BOWEL: No bowel obstruction. Appendix is not visualized. No evidence of   diverticulitis.  PERITONEUM: No ascites.  VESSELS: Atherosclerotic changes.  RETROPERITONEUM/LYMPH NODES: No lymphadenopathy.  ABDOMINAL WALL: Within normal limits.  BONES: Mild T12 compression deformity, age indeterminate but new since   March 04, 2021. Right hip arthroplasty.    IMPRESSION:  Large left proximal ureteral calculus causing moderate hydronephrosis.      < end of copied text >    Imaging Personally Reviewed:  YES    Consultant(s) Notes Reviewed:   YES    Care Discussed with Consultants :     Plan of care was discussed with patient and /or primary care giver; all questions and concerns were addressed and care was aligned with patient's wishes.

## 2022-08-23 NOTE — PROGRESS NOTE ADULT - SUBJECTIVE AND OBJECTIVE BOX
Surgery Progress Note     Subjective/24hour Events:   Patient seen and examined.   No acute events overnight. Been afebrile  Pain controlled.     Vital Signs:  Vital Signs Last 24 Hrs  T(C): 36.5 (23 Aug 2022 14:34), Max: 37 (23 Aug 2022 06:01)  T(F): 97.7 (23 Aug 2022 14:34), Max: 98.6 (23 Aug 2022 06:01)  HR: 79 (23 Aug 2022 14:34) (76 - 83)  BP: 134/60 (23 Aug 2022 14:34) (127/76 - 141/56)  BP(mean): 78 (22 Aug 2022 21:26) (78 - 78)  RR: 18 (23 Aug 2022 14:34) (18 - 18)  SpO2: 98% (23 Aug 2022 14:34) (96% - 98%)    Parameters below as of 23 Aug 2022 14:34  Patient On (Oxygen Delivery Method): room air        CAPILLARY BLOOD GLUCOSE      POCT Blood Glucose.: 127 mg/dL (23 Aug 2022 16:26)  POCT Blood Glucose.: 178 mg/dL (23 Aug 2022 11:54)  POCT Blood Glucose.: 176 mg/dL (23 Aug 2022 08:00)  POCT Blood Glucose.: 236 mg/dL (22 Aug 2022 21:21)      I&O's Detail    22 Aug 2022 07:01  -  23 Aug 2022 07:00  --------------------------------------------------------  IN:  Total IN: 0 mL    OUT:    Indwelling Catheter - Urethral (mL): 500 mL    Voided (mL): 200 mL  Total OUT: 700 mL    Total NET: -700 mL      23 Aug 2022 07:01  -  23 Aug 2022 17:48  --------------------------------------------------------  IN:    Oral Fluid: 24 mL  Total IN: 24 mL    OUT:    Stool (mL): 1 mL  Total OUT: 1 mL    Total NET: 23 mL          MEDICATIONS  (STANDING):  aspirin enteric coated 81 milliGRAM(s) Oral daily  cefTRIAXone   IVPB 2000 milliGRAM(s) IV Intermittent every 24 hours  dextrose 5%. 1000 milliLiter(s) (50 mL/Hr) IV Continuous <Continuous>  dextrose 50% Injectable 25 Gram(s) IV Push once  enoxaparin Injectable 40 milliGRAM(s) SubCutaneous every 24 hours  gabapentin 300 milliGRAM(s) Oral three times a day  glucagon  Injectable 1 milliGRAM(s) IntraMuscular once  insulin lispro (ADMELOG) corrective regimen sliding scale   SubCutaneous three times a day before meals  insulin lispro (ADMELOG) corrective regimen sliding scale   SubCutaneous at bedtime  levothyroxine 100 MICROGram(s) Oral daily  losartan 50 milliGRAM(s) Oral daily  montelukast 10 milliGRAM(s) Oral daily  polyethylene glycol 3350 17 Gram(s) Oral two times a day  senna 2 Tablet(s) Oral at bedtime    MEDICATIONS  (PRN):  ALBUTerol    90 MICROgram(s) HFA Inhaler 2 Puff(s) Inhalation every 6 hours PRN Shortness of Breath and/or Wheezing  dextrose Oral Gel 15 Gram(s) Oral once PRN Blood Glucose LESS THAN 70 milliGRAM(s)/deciliter  ondansetron Injectable 4 milliGRAM(s) IV Push every 6 hours PRN Nausea      Physical Exam:  Gen: NAD.  Lungs: Non labored breathing.   Ab: Soft, nontender, nondistended. No CVAT  Voiding freely    Labs:    08-23    136  |  102  |  13  ----------------------------<  172<H>  4.0   |  24  |  0.76    Ca    9.2      23 Aug 2022 06:56  Phos  2.0     08-22                              11.0   11.67 )-----------( 327      ( 23 Aug 2022 06:56 )             33.8

## 2022-08-23 NOTE — PROGRESS NOTE ADULT - ASSESSMENT
76 yoF s/p cysto, L ureteral stent placement for obstructing stone pod#4, urosepsis  afebrile, wbc wnl   Ucx/Bcx proteus mirab, on CTX; ID consulted    - continue abx, as per ID   - f/u PT  - replete electrolytes   - care as per primary team  - d/w Dr. Willis

## 2022-08-24 ENCOUNTER — TRANSCRIPTION ENCOUNTER (OUTPATIENT)
Age: 77
End: 2022-08-24

## 2022-08-24 VITALS — WEIGHT: 141.98 LBS

## 2022-08-24 LAB
ANION GAP SERPL CALC-SCNC: 10 MMOL/L — SIGNIFICANT CHANGE UP (ref 5–17)
BUN SERPL-MCNC: 14 MG/DL — SIGNIFICANT CHANGE UP (ref 7–18)
CALCIUM SERPL-MCNC: 9.1 MG/DL — SIGNIFICANT CHANGE UP (ref 8.4–10.5)
CHLORIDE SERPL-SCNC: 105 MMOL/L — SIGNIFICANT CHANGE UP (ref 96–108)
CO2 SERPL-SCNC: 24 MMOL/L — SIGNIFICANT CHANGE UP (ref 22–31)
CREAT SERPL-MCNC: 0.72 MG/DL — SIGNIFICANT CHANGE UP (ref 0.5–1.3)
EGFR: 87 ML/MIN/1.73M2 — SIGNIFICANT CHANGE UP
GLUCOSE BLDC GLUCOMTR-MCNC: 131 MG/DL — HIGH (ref 70–99)
GLUCOSE BLDC GLUCOMTR-MCNC: 184 MG/DL — HIGH (ref 70–99)
GLUCOSE BLDC GLUCOMTR-MCNC: 185 MG/DL — HIGH (ref 70–99)
GLUCOSE SERPL-MCNC: 185 MG/DL — HIGH (ref 70–99)
HCT VFR BLD CALC: 32.5 % — LOW (ref 34.5–45)
HGB BLD-MCNC: 10.5 G/DL — LOW (ref 11.5–15.5)
MCHC RBC-ENTMCNC: 28.2 PG — SIGNIFICANT CHANGE UP (ref 27–34)
MCHC RBC-ENTMCNC: 32.3 GM/DL — SIGNIFICANT CHANGE UP (ref 32–36)
MCV RBC AUTO: 87.1 FL — SIGNIFICANT CHANGE UP (ref 80–100)
NRBC # BLD: 0 /100 WBCS — SIGNIFICANT CHANGE UP (ref 0–0)
PLATELET # BLD AUTO: 383 K/UL — SIGNIFICANT CHANGE UP (ref 150–400)
POTASSIUM SERPL-MCNC: 3.7 MMOL/L — SIGNIFICANT CHANGE UP (ref 3.5–5.3)
POTASSIUM SERPL-SCNC: 3.7 MMOL/L — SIGNIFICANT CHANGE UP (ref 3.5–5.3)
RBC # BLD: 3.73 M/UL — LOW (ref 3.8–5.2)
RBC # FLD: 13.2 % — SIGNIFICANT CHANGE UP (ref 10.3–14.5)
SARS-COV-2 RNA SPEC QL NAA+PROBE: SIGNIFICANT CHANGE UP
SODIUM SERPL-SCNC: 139 MMOL/L — SIGNIFICANT CHANGE UP (ref 135–145)
WBC # BLD: 11.04 K/UL — HIGH (ref 3.8–10.5)
WBC # FLD AUTO: 11.04 K/UL — HIGH (ref 3.8–10.5)

## 2022-08-24 PROCEDURE — 80053 COMPREHEN METABOLIC PANEL: CPT

## 2022-08-24 PROCEDURE — 80048 BASIC METABOLIC PNL TOTAL CA: CPT

## 2022-08-24 PROCEDURE — 87086 URINE CULTURE/COLONY COUNT: CPT

## 2022-08-24 PROCEDURE — C2617: CPT

## 2022-08-24 PROCEDURE — 97110 THERAPEUTIC EXERCISES: CPT

## 2022-08-24 PROCEDURE — 85027 COMPLETE CBC AUTOMATED: CPT

## 2022-08-24 PROCEDURE — 82962 GLUCOSE BLOOD TEST: CPT

## 2022-08-24 PROCEDURE — 99239 HOSP IP/OBS DSCHRG MGMT >30: CPT

## 2022-08-24 PROCEDURE — 87150 DNA/RNA AMPLIFIED PROBE: CPT

## 2022-08-24 PROCEDURE — 85730 THROMBOPLASTIN TIME PARTIAL: CPT

## 2022-08-24 PROCEDURE — 86850 RBC ANTIBODY SCREEN: CPT

## 2022-08-24 PROCEDURE — 76000 FLUOROSCOPY <1 HR PHYS/QHP: CPT

## 2022-08-24 PROCEDURE — 36415 COLL VENOUS BLD VENIPUNCTURE: CPT

## 2022-08-24 PROCEDURE — 84100 ASSAY OF PHOSPHORUS: CPT

## 2022-08-24 PROCEDURE — 99285 EMERGENCY DEPT VISIT HI MDM: CPT

## 2022-08-24 PROCEDURE — 94640 AIRWAY INHALATION TREATMENT: CPT

## 2022-08-24 PROCEDURE — 93005 ELECTROCARDIOGRAM TRACING: CPT

## 2022-08-24 PROCEDURE — 84443 ASSAY THYROID STIM HORMONE: CPT

## 2022-08-24 PROCEDURE — 96374 THER/PROPH/DIAG INJ IV PUSH: CPT

## 2022-08-24 PROCEDURE — 85610 PROTHROMBIN TIME: CPT

## 2022-08-24 PROCEDURE — 93306 TTE W/DOPPLER COMPLETE: CPT

## 2022-08-24 PROCEDURE — 97530 THERAPEUTIC ACTIVITIES: CPT

## 2022-08-24 PROCEDURE — 85025 COMPLETE CBC W/AUTO DIFF WBC: CPT

## 2022-08-24 PROCEDURE — 97162 PT EVAL MOD COMPLEX 30 MIN: CPT

## 2022-08-24 PROCEDURE — 70450 CT HEAD/BRAIN W/O DYE: CPT | Mod: MA

## 2022-08-24 PROCEDURE — 86900 BLOOD TYPING SEROLOGIC ABO: CPT

## 2022-08-24 PROCEDURE — 86901 BLOOD TYPING SEROLOGIC RH(D): CPT

## 2022-08-24 PROCEDURE — 83735 ASSAY OF MAGNESIUM: CPT

## 2022-08-24 PROCEDURE — 73130 X-RAY EXAM OF HAND: CPT

## 2022-08-24 PROCEDURE — 87077 CULTURE AEROBIC IDENTIFY: CPT

## 2022-08-24 PROCEDURE — 97116 GAIT TRAINING THERAPY: CPT

## 2022-08-24 PROCEDURE — 83605 ASSAY OF LACTIC ACID: CPT

## 2022-08-24 PROCEDURE — 71045 X-RAY EXAM CHEST 1 VIEW: CPT

## 2022-08-24 PROCEDURE — 87186 SC STD MICRODIL/AGAR DIL: CPT

## 2022-08-24 PROCEDURE — 74177 CT ABD & PELVIS W/CONTRAST: CPT | Mod: MA

## 2022-08-24 PROCEDURE — 81001 URINALYSIS AUTO W/SCOPE: CPT

## 2022-08-24 PROCEDURE — 86803 HEPATITIS C AB TEST: CPT

## 2022-08-24 PROCEDURE — 87635 SARS-COV-2 COVID-19 AMP PRB: CPT

## 2022-08-24 PROCEDURE — 84484 ASSAY OF TROPONIN QUANT: CPT

## 2022-08-24 PROCEDURE — 83036 HEMOGLOBIN GLYCOSYLATED A1C: CPT

## 2022-08-24 PROCEDURE — 87040 BLOOD CULTURE FOR BACTERIA: CPT

## 2022-08-24 RX ORDER — LOSARTAN POTASSIUM 100 MG/1
1 TABLET, FILM COATED ORAL
Qty: 0 | Refills: 0 | DISCHARGE

## 2022-08-24 RX ORDER — ASPIRIN/CALCIUM CARB/MAGNESIUM 324 MG
1 TABLET ORAL
Qty: 0 | Refills: 0 | DISCHARGE

## 2022-08-24 RX ORDER — MONTELUKAST 4 MG/1
1 TABLET, CHEWABLE ORAL
Qty: 0 | Refills: 0 | DISCHARGE
Start: 2022-08-24

## 2022-08-24 RX ORDER — ALBUTEROL 90 UG/1
2 AEROSOL, METERED ORAL
Qty: 0 | Refills: 0 | DISCHARGE

## 2022-08-24 RX ORDER — GLIMEPIRIDE 1 MG
1 TABLET ORAL
Qty: 0 | Refills: 0 | DISCHARGE

## 2022-08-24 RX ORDER — LEVOTHYROXINE SODIUM 125 MCG
1 TABLET ORAL
Qty: 0 | Refills: 0 | DISCHARGE
Start: 2022-08-24

## 2022-08-24 RX ORDER — SENNA PLUS 8.6 MG/1
2 TABLET ORAL
Qty: 0 | Refills: 0 | DISCHARGE
Start: 2022-08-24

## 2022-08-24 RX ORDER — ALBUTEROL 90 UG/1
2 AEROSOL, METERED ORAL
Qty: 0 | Refills: 0 | DISCHARGE
Start: 2022-08-24

## 2022-08-24 RX ORDER — ALOGLIPTIN 12.5 MG/1
1 TABLET, FILM COATED ORAL
Qty: 0 | Refills: 0 | DISCHARGE

## 2022-08-24 RX ORDER — CEFUROXIME AXETIL 250 MG
1 TABLET ORAL
Qty: 14 | Refills: 0
Start: 2022-08-24 | End: 2022-08-30

## 2022-08-24 RX ORDER — MONTELUKAST 4 MG/1
1 TABLET, CHEWABLE ORAL
Qty: 0 | Refills: 0 | DISCHARGE

## 2022-08-24 RX ORDER — ASPIRIN/CALCIUM CARB/MAGNESIUM 324 MG
1 TABLET ORAL
Qty: 0 | Refills: 0 | DISCHARGE
Start: 2022-08-24

## 2022-08-24 RX ORDER — LOSARTAN POTASSIUM 100 MG/1
1 TABLET, FILM COATED ORAL
Qty: 0 | Refills: 0 | DISCHARGE
Start: 2022-08-24

## 2022-08-24 RX ORDER — LEVOTHYROXINE SODIUM 125 MCG
1 TABLET ORAL
Qty: 0 | Refills: 0 | DISCHARGE

## 2022-08-24 RX ADMIN — Medication 2: at 08:00

## 2022-08-24 RX ADMIN — Medication 81 MILLIGRAM(S): at 11:58

## 2022-08-24 RX ADMIN — POLYETHYLENE GLYCOL 3350 17 GRAM(S): 17 POWDER, FOR SOLUTION ORAL at 05:37

## 2022-08-24 RX ADMIN — Medication 100 MICROGRAM(S): at 05:37

## 2022-08-24 RX ADMIN — MONTELUKAST 10 MILLIGRAM(S): 4 TABLET, CHEWABLE ORAL at 11:59

## 2022-08-24 RX ADMIN — Medication 2: at 11:57

## 2022-08-24 RX ADMIN — GABAPENTIN 300 MILLIGRAM(S): 400 CAPSULE ORAL at 16:58

## 2022-08-24 RX ADMIN — LOSARTAN POTASSIUM 50 MILLIGRAM(S): 100 TABLET, FILM COATED ORAL at 05:37

## 2022-08-24 RX ADMIN — GABAPENTIN 300 MILLIGRAM(S): 400 CAPSULE ORAL at 05:37

## 2022-08-24 RX ADMIN — ENOXAPARIN SODIUM 40 MILLIGRAM(S): 100 INJECTION SUBCUTANEOUS at 05:37

## 2022-08-24 NOTE — PROGRESS NOTE ADULT - PROBLEM SELECTOR PLAN 4
potassium 3.0 replaced--> K 4.0 today   Resolved
Resolved  F/u BMp in AM
potassium 3.0 replaced--> K 4.0 today   monitor BMP

## 2022-08-24 NOTE — DISCHARGE NOTE NURSING/CASE MANAGEMENT/SOCIAL WORK - PATIENT PORTAL LINK FT
You can access the FollowMyHealth Patient Portal offered by [facility] by registering at the following website: [email or web address]. By joining [facility]’s FollowMyHealth portal, you will also be able to view your health information using other applications (apps) compatible with our system.

## 2022-08-24 NOTE — PROGRESS NOTE ADULT - SUBJECTIVE AND OBJECTIVE BOX
Surgery Progress Note     Subjective/24hour Events:   Patient seen and examined.   No acute events overnight. Doing well. Tolerating diet, voiding freely. Feels much improved.   Pain controlled.     Vital Signs:  Vital Signs Last 24 Hrs  T(C): 36.9 (24 Aug 2022 06:19), Max: 37.1 (23 Aug 2022 20:42)  T(F): 98.5 (24 Aug 2022 06:19), Max: 98.7 (23 Aug 2022 20:42)  HR: 79 (24 Aug 2022 06:19) (72 - 83)  BP: 120/53 (24 Aug 2022 06:19) (120/53 - 134/60)  BP(mean): 69 (24 Aug 2022 06:19) (69 - 72)  RR: 18 (24 Aug 2022 06:19) (18 - 18)  SpO2: 97% (24 Aug 2022 06:19) (95% - 98%)    Parameters below as of 24 Aug 2022 06:19  Patient On (Oxygen Delivery Method): room air        CAPILLARY BLOOD GLUCOSE      POCT Blood Glucose.: 184 mg/dL (24 Aug 2022 07:52)  POCT Blood Glucose.: 206 mg/dL (23 Aug 2022 20:57)  POCT Blood Glucose.: 127 mg/dL (23 Aug 2022 16:26)  POCT Blood Glucose.: 178 mg/dL (23 Aug 2022 11:54)      I&O's Detail    23 Aug 2022 07:01  -  24 Aug 2022 07:00  --------------------------------------------------------  IN:    Oral Fluid: 24 mL  Total IN: 24 mL    OUT:    Stool (mL): 1 mL  Total OUT: 1 mL    Total NET: 23 mL          MEDICATIONS  (STANDING):  aspirin enteric coated 81 milliGRAM(s) Oral daily  cefTRIAXone   IVPB 2000 milliGRAM(s) IV Intermittent every 24 hours  dextrose 5%. 1000 milliLiter(s) (50 mL/Hr) IV Continuous <Continuous>  dextrose 50% Injectable 25 Gram(s) IV Push once  enoxaparin Injectable 40 milliGRAM(s) SubCutaneous every 24 hours  gabapentin 300 milliGRAM(s) Oral three times a day  glucagon  Injectable 1 milliGRAM(s) IntraMuscular once  insulin lispro (ADMELOG) corrective regimen sliding scale   SubCutaneous three times a day before meals  insulin lispro (ADMELOG) corrective regimen sliding scale   SubCutaneous at bedtime  levothyroxine 100 MICROGram(s) Oral daily  losartan 50 milliGRAM(s) Oral daily  montelukast 10 milliGRAM(s) Oral daily  polyethylene glycol 3350 17 Gram(s) Oral two times a day  senna 2 Tablet(s) Oral at bedtime    MEDICATIONS  (PRN):  ALBUTerol    90 MICROgram(s) HFA Inhaler 2 Puff(s) Inhalation every 6 hours PRN Shortness of Breath and/or Wheezing  dextrose Oral Gel 15 Gram(s) Oral once PRN Blood Glucose LESS THAN 70 milliGRAM(s)/deciliter  ondansetron Injectable 4 milliGRAM(s) IV Push every 6 hours PRN Nausea      Physical Exam:  Gen: NAD.  Lungs: Non labored breathing.   Ab: Soft, nontender, nondistended. No CVAT   : Voiding freely    Labs:    08-24    139  |  105  |  14  ----------------------------<  185<H>  3.7   |  24  |  0.72    Ca    9.1      24 Aug 2022 05:29                              10.5   11.04 )-----------( 383      ( 24 Aug 2022 05:29 )             32.5

## 2022-08-24 NOTE — PROGRESS NOTE ADULT - ASSESSMENT
76 y.o. F with PMH L UPJ calculus s/p cysto, stent March 2021, HTN, DM, hypothyroid presents to [facility] ER c/o weakness, found to have a  Large left proximal ureteral calculus causing moderate hydronephrosis. Admitted for Urosepsis 2/2 obstructive uropathy s/p stent placement on 8/17. Hospital course c/b GNR bacteremia, pt  has a positive urine culture for Proteus as well as positive blood cultures for Proteus as well   transferred to medicine service on 8/20.  ID on board pt being treated with Rocephin 2 g daily. Repeat blood culture 8/20 NGTD. segura discontinued, TOV. Voiding freely. f/u with out patient urology for stent management.  PT reconsulted & recommends [facility]. Pending choices.

## 2022-08-24 NOTE — DIETITIAN INITIAL EVALUATION ADULT - NSFNSGIIOFT_GEN_A_CORE
08-23-22 @ 07:01  -  08-24-22 @ 07:00  --------------------------------------------------------  OUT:    Stool (mL): 1 mL  Total OUT: 1 mL    Total NET: -1 mL

## 2022-08-24 NOTE — PROGRESS NOTE ADULT - PROBLEM SELECTOR PLAN 3
phosphorus 1.4-->2.0  replaced  monitor electrolytes   phos level in am
phosphorus 1.4-->2.0  replaced  monitor electrolytes   phos level in am
monitor electrolytes   phos level in am

## 2022-08-24 NOTE — PROGRESS NOTE ADULT - SUBJECTIVE AND OBJECTIVE BOX
Patient is a 76y old  Female who presents with a chief complaint of Sepsis secondary to UTI, Left ureteral calculus (24 Aug 2022 08:12)      INTERVAL HPI/OVERNIGHT EVENTS: no overnight events    I&O's Summary    23 Aug 2022 07:01  -  24 Aug 2022 07:00  --------------------------------------------------------  IN: 24 mL / OUT: 1 mL / NET: 23 mL      Vital Signs Last 24 Hrs  T(C): 36.9 (24 Aug 2022 06:19), Max: 37.1 (23 Aug 2022 20:42)  T(F): 98.5 (24 Aug 2022 06:19), Max: 98.7 (23 Aug 2022 20:42)  HR: 79 (24 Aug 2022 06:19) (72 - 79)  BP: 120/53 (24 Aug 2022 06:19) (120/53 - 134/60)  BP(mean): 69 (24 Aug 2022 06:19) (69 - 72)  RR: 18 (24 Aug 2022 06:19) (18 - 18)  SpO2: 97% (24 Aug 2022 06:19) (95% - 98%)    Parameters below as of 24 Aug 2022 06:19  Patient On (Oxygen Delivery Method): room air      PAST MEDICAL & SURGICAL HISTORY:  DM (diabetes mellitus)      HTN (hypertension)      HLD (hyperlipidemia)      Hypothyroidism      S/P cholecystectomy      History of hip surgery          SOCIAL HISTORY  Alcohol:  Tobacco:  Illicit substance use:      FAMILY HISTORY:      LABS:                        10.5   11.04 )-----------( 383      ( 24 Aug 2022 05:29 )             32.5     08-24    139  |  105  |  14  ----------------------------<  185<H>  3.7   |  24  |  0.72    Ca    9.1      24 Aug 2022 05:29          CAPILLARY BLOOD GLUCOSE      POCT Blood Glucose.: 185 mg/dL (24 Aug 2022 11:33)  POCT Blood Glucose.: 184 mg/dL (24 Aug 2022 07:52)  POCT Blood Glucose.: 206 mg/dL (23 Aug 2022 20:57)  POCT Blood Glucose.: 127 mg/dL (23 Aug 2022 16:26)            MEDICATIONS  (STANDING):  aspirin enteric coated 81 milliGRAM(s) Oral daily  cefTRIAXone   IVPB 2000 milliGRAM(s) IV Intermittent every 24 hours  dextrose 5%. 1000 milliLiter(s) (50 mL/Hr) IV Continuous <Continuous>  dextrose 50% Injectable 25 Gram(s) IV Push once  enoxaparin Injectable 40 milliGRAM(s) SubCutaneous every 24 hours  gabapentin 300 milliGRAM(s) Oral three times a day  glucagon  Injectable 1 milliGRAM(s) IntraMuscular once  insulin lispro (ADMELOG) corrective regimen sliding scale   SubCutaneous at bedtime  insulin lispro (ADMELOG) corrective regimen sliding scale   SubCutaneous three times a day before meals  levothyroxine 100 MICROGram(s) Oral daily  losartan 50 milliGRAM(s) Oral daily  montelukast 10 milliGRAM(s) Oral daily  polyethylene glycol 3350 17 Gram(s) Oral two times a day  senna 2 Tablet(s) Oral at bedtime    MEDICATIONS  (PRN):  ALBUTerol    90 MICROgram(s) HFA Inhaler 2 Puff(s) Inhalation every 6 hours PRN Shortness of Breath and/or Wheezing  dextrose Oral Gel 15 Gram(s) Oral once PRN Blood Glucose LESS THAN 70 milliGRAM(s)/deciliter  ondansetron Injectable 4 milliGRAM(s) IV Push every 6 hours PRN Nausea      REVIEW OF SYSTEMS:  CONSTITUTIONAL: No fever  EYES: No eye pain, visual disturbances  ENMT:  No difficulty hearing, No sinus or throat pain  NECK: No pain or stiffness  RESPIRATORY: No cough,  No shortness of breath  CARDIOVASCULAR: No chest pain  GASTROINTESTINAL: No abdominal pain. No nausea, vomiting,   GENITOURINARY: No dysuria,  NEUROLOGICAL: No headaches,   SKIN: No, rashes, or lesions   MUSCULOSKELETAL: No joint pain or swelling;      RADIOLOGY & ADDITIONAL TESTS:< from: Xray Chest 1 View-PORTABLE IMMEDIATE (08.17.22 @ 10:14) >    ACC: [id number] EXAM:  XR HAND MIN 3 VIEWS LT                        ACC: [id number] EXAM:  XR CHEST PORTABLE IMMED 1V                          PROCEDURE DATE:  08/17/2022          INTERPRETATION:  Chest and left hand. Patient has weakness and trauma to   the left hand.    He chest on August 17, 2022 at 10:02 AM.    Heart size is within normal limits.    Small atelectatic type area of the right lower hilum is new since March 4, 2021.    Left hand. 3 views.    Mild to moderate degeneration at the base of the first metacarpal noted.    Slight diffuse IP degeneration.    No bone destruction or fracture.    IMPRESSION: No fracture. Slight atelectasis in the right lower lung field   presently seen.    --- End of Report ---            SCOTT SINGH; Attending Radiologist  This document has been electronically signed. Aug 17 2022  4:    < end of copied text >  < from: CT Head No Cont (08.17.22 @ 10:32) >    ACC: [id number] EXAM:  CT BRAIN                          PROCEDURE DATE:  08/17/2022          INTERPRETATION:  .    CLINICAL INFORMATION: Weakness and fall.    TECHNIQUE: Multiple axial CT images of the head were obtained without   contrast. [place] coronal reconstructed images were acquired from   the source data.    COMPARISON: Prior head CT study from 3/4/2021.    FINDINGS: There is no acute intracranial hemorrhage, mass effect, shift   of the midline structures, herniation, extra-axial fluid collection, or   hydrocephalus.    There is diffuse cerebral volume loss with prominence of the sulci,   fissures, and cisternal spaces which is normal for the patient's age.   There is mild deep and periventricular white matter hypoattenuation   statistically compatible with microvascular changes given calcific   atherosclerotic disease of the intracranial arteries.    The focal thickening and/or layering secretions are seen within the left   maxillary sinus. The mastoid air cells are clear.The left jugular bulb   is borderline high riding. The calvarium is intact. There is evidence of   bilateral cataract removal.    IMPRESSION: No acute intracranial hemorrhage, mass effect, or shift of   the midline structures.    Similar-appearing mild chronic white matter microvascular type changes.    --- End of Report ---            LEVI LEES MD; Attending Radiologist  This document has been electronically signed. Aug 17 2022 10:42AM    < end of copied text >  < from: CT Abdomen and Pelvis w/ IV Cont (08.17.22 @ 10:35) >    ACC: [id number] EXAM:  CT ABDOMEN AND PELVIS IC                          PROCEDURE DATE:  08/17/2022          INTERPRETATION:  CLINICAL INFORMATION: Left-sided abdominal pain and   fever. Rule out diverticulitis.    COMPARISON: March 04, 2021    CONTRAST/COMPLICATIONS:  IV Contrast: Omnipaque 350  90 cc administered   10 cc discarded  Oral Contrast: NONE  Complications: None reported at time of study completion    PROCEDURE:  CT of the Abdomen and Pelvis was performed.  Sagittal and coronal reformats were performed.    FINDINGS:  LOWER CHEST: Within normal limits.    LIVER: Steatosis.  BILE DUCTS: Normal caliber.  GALLBLADDER: Cholecystectomy.  SPLEEN: Within normal limits.  PANCREAS: Within normal limits.  ADRENALS: Within normal limits.  KIDNEYS/URETERS: Left renal cyst. Left-sided perinephric stranding and   moderate hydronephrosis due to a proximal ureteral stone measuring 0.7 x   1.7 cm.    BLADDER: Poorly evaluated due to artifact from the patient's right hip   arthroplasty.  REPRODUCTIVE ORGANS: Hysterectomy. The ovaries appear to be present and   are unremarkable. Please correlate with surgical history.    BOWEL: No bowel obstruction. Appendix is not visualized. No evidence of   diverticulitis.  PERITONEUM: No ascites.  VESSELS: Atherosclerotic changes.  RETROPERITONEUM/LYMPH NODES: No lymphadenopathy.  ABDOMINAL WALL: Within normal limits.  BONES: Mild T12 compression deformity, age indeterminate but new since   March 04, 2021. Right hip arthroplasty.    IMPRESSION:  Large left proximal ureteral calculus causing moderate hydronephrosis.        --- End of Report ---            BISI LAMAS MD; Attending Radiologist    < end of copied text >  < from: Xray Hand 3 Views, Left (08.17.22 @ 10:45) >    ACC: [id number] EXAM:  XR HAND MIN 3 VIEWS LT                        ACC: [id number] EXAM:  XR CHEST PORTABLE IMMED 1V                          PROCEDURE DATE:  08/17/2022          INTERPRETATION:  Chest and left hand. Patient has weakness and trauma to   the left hand.    He chest on August 17, 2022 at 10:02 AM.    Heart size is within normal limits.    Small atelectatic type area of the right lower hilum is new since March 4, 2021.    Left hand. 3 views.    Mild to moderate degeneration at the base of the first metacarpal noted.    Slight diffuse IP degeneration.    No bone destruction or fracture.    IMPRESSION: No fracture. Slight atelectasis in the right lower lung field   presently seen.    --- End of Report ---            SCOTT SINGH; Attending Radiologist  This document has been electronically signed. Aug 17 2022  4:21PM    < end of copied text >  < from: Xray Chest 1 View-PORTABLE IMMEDIATE (Xray Chest 1 View-PORTABLE IMMEDIATE .) (08.19.22 @ 13:53) >    ACC: [id number] EXAM:  XR CHEST PORTABLE IMMED 1V                          PROCEDURE DATE:  08/19/2022          INTERPRETATION:  Exam:XR CHEST IMMEDIATE    clinical history:Shortness of breath    Slight prominence of interstitial markings since the most recent exam   could be seen with pulmonary venous hypertension. No focal consolidation.   No effusions or pneumothorax.    IMPRESSION: Question pulmonary venous hypertension    --- End of Report ---    MAURA BECK MD; Attending Radiologist  This document has been electronically signed. Aug 21 2022  9:3    < end of copied text >      Imaging Personally Reviewed:  [ x] YES  [ ] NO    Consultant(s) Notes Reviewed:  [x ] YES  [ ] NO    PHYSICAL EXAM:  GENERAL: NAD  HEAD:  Atraumatic, Normocephalic  EYES:  conjunctiva and sclera clear  ENMT:  Moist mucous membranes  NECK: Supple  NERVOUS SYSTEM:  Alert & Oriented X2  CHEST/LUNG: CTA bilaterally; No rales, rhonchi, wheezing  HEART: Regular rate and rhythm  ABDOMEN: Soft, Nontender, Nondistended; Bowel sounds present  EXTREMITIES:  2+ Peripheral Pulses, No clubbing, cyanosis, or edema  SKIN: No rashes or lesions    Care Collaborated Discussed with Consultants/Other Providers [ x] YES  [ ] NO

## 2022-08-24 NOTE — PROGRESS NOTE ADULT - PROBLEM SELECTOR PLAN 1
blood culture 8/17 growing proteus   urine culture + proteus mirabilis   repeat blood cuture 8/20 NGTD  f/u final Bcx reults 8/20/22  c/w rocephin   ID recc: complete total 14 days Ceftin 500 mg BID from date of stent exchange which was on 8/17/22
blood culture 8/17 growing proteus   urine culture + proteus mirabilis   repeat blood cuture 8/20 negative utd   follow up final blood culture   continue rocephine as per ID
blood culture 8/17 growing proteus   urine culture + proteus mirabilis   repeat blood cuture 8/20 NGTD  follow up final blood culture   continue rocephine as per ID

## 2022-08-24 NOTE — PROGRESS NOTE ADULT - PROBLEM SELECTOR PLAN 5
continue sliding scale   monitor blood sugar  Diabetic diet
A1c 6.2, CONTROLLED  continue sliding scale   monitor blood sugar  Diabetic diet
continue sliding scale   monitor blood sugar

## 2022-08-24 NOTE — PROGRESS NOTE ADULT - PROBLEM SELECTOR PLAN 9
Initial PT recommended home with PT  PT reconsulted, & recs [facility] now  Pending choices and accptance   pending final abx plan from ID  Covid test due in AM 8/24
PT recc [facility]   Pending choices and acceptance   Covid negative 8/24/22
pt to be discharge home with homePT and rolling walker   pending final abx plan from ID

## 2022-08-24 NOTE — PROGRESS NOTE ADULT - REASON FOR ADMISSION
Sepsis secondary to UTI, Left ureteral calculus

## 2022-08-24 NOTE — PROGRESS NOTE ADULT - PROVIDER SPECIALTY LIST ADULT
Internal Medicine
Urology
Internal Medicine
Internal Medicine
Urology
Urology
Internal Medicine
Internal Medicine

## 2022-08-24 NOTE — PROGRESS NOTE ADULT - ASSESSMENT
76 yoF s/p cysto, L ureteral stent placement for obstructing stone pod#4, urosepsis  afebrile, wbc wnl   Ucx/Bcx proteus mirab, on CTX; ID consulted    - continue abx, as per ID   - f/u PT--> recommending subacute rehab  - replete electrolytes   - care as per primary team

## 2022-08-24 NOTE — DIETITIAN INITIAL EVALUATION ADULT - PERTINENT MEDS FT
MEDICATIONS  (STANDING):  aspirin enteric coated 81 milliGRAM(s) Oral daily  cefTRIAXone   IVPB 2000 milliGRAM(s) IV Intermittent every 24 hours  dextrose 5%. 1000 milliLiter(s) (50 mL/Hr) IV Continuous <Continuous>  dextrose 50% Injectable 25 Gram(s) IV Push once  enoxaparin Injectable 40 milliGRAM(s) SubCutaneous every 24 hours  gabapentin 300 milliGRAM(s) Oral three times a day  glucagon  Injectable 1 milliGRAM(s) IntraMuscular once  insulin lispro (ADMELOG) corrective regimen sliding scale   SubCutaneous at bedtime  insulin lispro (ADMELOG) corrective regimen sliding scale   SubCutaneous three times a day before meals  levothyroxine 100 MICROGram(s) Oral daily  losartan 50 milliGRAM(s) Oral daily  montelukast 10 milliGRAM(s) Oral daily  polyethylene glycol 3350 17 Gram(s) Oral two times a day  senna 2 Tablet(s) Oral at bedtime    MEDICATIONS  (PRN):  ALBUTerol    90 MICROgram(s) HFA Inhaler 2 Puff(s) Inhalation every 6 hours PRN Shortness of Breath and/or Wheezing  dextrose Oral Gel 15 Gram(s) Oral once PRN Blood Glucose LESS THAN 70 milliGRAM(s)/deciliter  ondansetron Injectable 4 milliGRAM(s) IV Push every 6 hours PRN Nausea

## 2022-08-24 NOTE — DISCHARGE NOTE NURSING/CASE MANAGEMENT/SOCIAL WORK - NSDCPEFALRISK_GEN_ALL_CORE
For information on Fall & Injury Prevention, visit: [email or web address] OR  [email or web address] OR  https://www.cdc.gov/steadi/patient.html

## 2022-08-24 NOTE — DIETITIAN INITIAL EVALUATION ADULT - OTHER INFO
S/p left ureteral  stent placement 8/17. Pt seen, asleep. RN reports pt with good intake, for D/C to [facility] this PM (per )

## 2022-08-25 LAB
CULTURE RESULTS: SIGNIFICANT CHANGE UP
CULTURE RESULTS: SIGNIFICANT CHANGE UP
SPECIMEN SOURCE: SIGNIFICANT CHANGE UP
SPECIMEN SOURCE: SIGNIFICANT CHANGE UP

## 2022-09-18 ENCOUNTER — TRANSCRIPTION ENCOUNTER (OUTPATIENT)
Age: 77
End: 2022-09-18

## 2022-11-03 ENCOUNTER — INPATIENT (INPATIENT)
Facility: HOSPITAL | Age: 77
LOS: 3 days | Discharge: HOME | DRG: 71 | End: 2022-11-07
Attending: INTERNAL MEDICINE | Admitting: INTERNAL MEDICINE
Payer: MEDICAID

## 2022-11-03 VITALS
TEMPERATURE: 99 F | RESPIRATION RATE: 16 BRPM | WEIGHT: 130.07 LBS | HEIGHT: 60 IN | OXYGEN SATURATION: 97 % | HEART RATE: 82 BPM | SYSTOLIC BLOOD PRESSURE: 166 MMHG | DIASTOLIC BLOOD PRESSURE: 95 MMHG

## 2022-11-03 DIAGNOSIS — Z98.890 OTHER SPECIFIED POSTPROCEDURAL STATES: Chronic | ICD-10-CM

## 2022-11-03 DIAGNOSIS — Z90.49 ACQUIRED ABSENCE OF OTHER SPECIFIED PARTS OF DIGESTIVE TRACT: Chronic | ICD-10-CM

## 2022-11-03 PROCEDURE — 99285 EMERGENCY DEPT VISIT HI MDM: CPT

## 2022-11-03 RX ORDER — SODIUM CHLORIDE 9 MG/ML
1000 INJECTION INTRAMUSCULAR; INTRAVENOUS; SUBCUTANEOUS ONCE
Refills: 0 | Status: COMPLETED | OUTPATIENT
Start: 2022-11-03 | End: 2022-11-03

## 2022-11-03 RX ORDER — ACETAMINOPHEN 500 MG
1000 TABLET ORAL ONCE
Refills: 0 | Status: COMPLETED | OUTPATIENT
Start: 2022-11-03 | End: 2022-11-03

## 2022-11-03 RX ORDER — SODIUM CHLORIDE 9 MG/ML
3 INJECTION INTRAMUSCULAR; INTRAVENOUS; SUBCUTANEOUS EVERY 8 HOURS
Refills: 0 | Status: DISCONTINUED | OUTPATIENT
Start: 2022-11-03 | End: 2022-11-07

## 2022-11-03 NOTE — ED PROVIDER NOTE - PHYSICAL EXAMINATION
Vital Signs Reviewed  GEN: Comfortable, NAD, AAOx3  HEENT: NCAT, MMM, Neck Supple  RESP: CTAB, No rales/rhonchi/wheezing  CV: RRR, S1S2, No murmurs  ABD: Lower ABD TTP w/o guarding, Soft, ND, No masses, No CVA Tenderness  Extrem/Skin: Equal pulses bilat, No cyanosis/edema/rashes  Neuro: CNs grossly intact, 5/5 strength in all extremities, Unable to ambulate w/o assistance

## 2022-11-03 NOTE — ED ADULT TRIAGE NOTE - CHIEF COMPLAINT QUOTE
BIB daughter concerned about worsening gen weakness, decreased nutritional intake, c/o intermittent abd discomfort and this morning with altered mental status

## 2022-11-03 NOTE — ED PROVIDER NOTE - CLINICAL SUMMARY MEDICAL DECISION MAKING FREE TEXT BOX
Pt p/w gen weakness, AMS with urinary symptoms. Labs, Urine, CTs pending. Pt stable. Will reassess. Pt p/w gen weakness, AMS with urinary symptoms. Labs, Urine, CTs pending. Pt stable. Will reassess.    UA concerning for UTI and CT showing pyelo. Given cef, pt stable and endorsed to inpatient team.

## 2022-11-03 NOTE — ED PROVIDER NOTE - OBJECTIVE STATEMENT
75 yo F h/o kidney stones with uretal stent removed 3 weeks ago, DM, HTN, HLD p/w 1 day of generalized weakness with episodes of confusion and inability to ambulate at baseline (BL=cane) and dysuria. Pt also not eating today. Hx per pt and daughter. No recent fever, N/V/ diarrhea, chest pain, SOB, focal numbness/weakness, syncope, other recent illness or hospitalizations.

## 2022-11-03 NOTE — ED ADULT TRIAGE NOTE - PATIENT ON (OXYGEN DELIVERY METHOD)
Urinary tract infection: take Keflex 500 mg twice a day for 7 days  Follow up with Dr. Fritz for pain specialist and discuss cannabis as well  Previous renal cyst: obtain US abdomen complete   Hold oxybutynin for the time being, if feeling that she is retaining and having difficulty emptying bladder then resume  Take Miralax for constipation  Mammogram this year   Return to clinic if any new or worsening symptoms.       Medicare Wellness Visit  Plan for Preventive Care    A good way for you to stay healthy is to use preventive care.  Medicare covers many services that can help you stay healthy.* The goal of these services is to find any health problems as quickly as possible. Finding problems early can help make them easier to treat.  Your personal plan below lists the services you may need and when they are due.     Health Maintenance Summary     Topic Due On Due Status Completed On    Medicare Wellness Visit Aug 21, 1997 Overdue     IMMUNIZATION - DTaP/Tdap/Td Aug 21, 1951 Overdue     Immunization-Influenza  Completed Sep 17, 2018    Depression Screening Aug 21, 1944 Overdue     Pneumococcal Vaccine 65+ Low/Medium Risk Oct 6, 2017 Overdue Oct 6, 2016    Immunization - Shingles Aug 21, 1982 Overdue     Immunization - MMR  Hidden            Preventive Care for Women and Men    Heart Screenings (Cardiovascular):  · Blood tests are used to check your cholesterol, lipid and triglyceride levels. High levels can increase your risk for heart disease and stroke. High levels can be treated with medications, diet and exercise. Lowering your levels can help keep your heart and blood vessels healthy.  Your provider will order these tests if they are needed.    · An ultrasound is done to see if you have an abdominal aortic aneurysm (AAA).  This is an enlargement of one of the main blood vessels that delivers blood to the body.   In the [place], 9,000 deaths are caused by AAA.  You may not even know you have this problem  and as many as 1 in 3 people will have a serious problem if it is not treated.  Early diagnosis allows for more effective treatment and cure.  If you have a family history of AAA or are a male age 65-75 who has smoked, you are at higher risk of an AAA.  Your provider can order this test, if needed.    Colorectal Screening:  · There are many tests that are used to check for cancer of your colon and rectum. You and your provider should discuss what test is best for you and when to have it done.  Options include:  · Screening Colonoscopy: exam of the entire colon, seen through a flexible lighted tube.  · Flexible Sigmoidoscopy: exam of the last third (sigmoid portion) of the colon and rectum, seen through a flexible lighted tube.  · Cologuard DNA stool test: a sample of your stool is used to screen for cancer and unseen blood in your stool.  · Fecal Occult Blood Test: a sample of your stool is studied to find any unseen blood    Flu Shot:  · An immunization that helps to prevent influenza (the flu). You should get this every year. The best time to get the shot is in the fall.    Pneumococcal Shot:  • Vaccines are available that can help prevent pneumococcal disease, which is any type of infection caused by Streptococcus pneumoniae bacteria.   Their use can prevent some cases of pneumonia, meningitis, and sepsis. There are two types of pneumococcal vaccines:   o Conjugate vaccines (PCV-13 or Prevnar 13®) - helps protect against the 13 types of pneumococcal bacteria that are the most common causes of serious infections in children and adults.    o Polysaccharide vaccine (PPSV23 or Nebjkmqvg44®) - helps protect against 23 types of pneumococcal bacteria for patients who are recommended to get it.  These vaccines should be given at least 12 months apart.  A booster is usually not needed.     Hepatitis B Shot:  · An immunization that helps to protect people from getting Hepatitis B. Hepatitis B is a virus that spreads  through contact with infected blood or body fluids. Many people with the virus do not have symptoms.  The virus can lead to serious problems, such as liver disease. Some people are at higher risk than others. Your doctor will tell you if you need this shot.     Diabetes Screening:  · A test to measure sugar (glucose) in your blood is called a fasting blood sugar. Fasting means you cannot have food or drink for at least 8 hours before the test. This test can detect diabetes long before you may notice symptoms.    Glaucoma Screening:  · Glaucoma screening is performed by your eye doctor. The test measures the fluid pressure inside your eyes to determine if you have glaucoma.     Hepatitis C Screening:  · A blood test to see if you have the hepatitis C virus.  Hepatitis C attacks the liver and is a major cause of chronic liver disease.  Medicare will cover a single screening for all adults born between 1945 & 1965, or high risk patients (people who have injected illegal drugs or people who have had blood transfusions).  High risk patients who continue to inject illegal drugs can be screened for Hepatitis C every year.    Smoking and Tobacco-Use Cessation Counseling:  · Tobacco is the single greatest cause of disease and early death in our country today. Medication and counseling together can increase a person’s chance of quitting for good.   · Medicare covers two quitting attempts per year, with four counseling sessions per attempt (eight sessions in a 12 month period)    Preventive Screening tests for Women    Screening Mammograms and Breast Exams:  · An x-ray of your breasts to check for breast cancer before you or your doctor may be able to feel it.  If breast cancer is found early it can usually be treated with success.    Pelvic Exams and Pap Tests:  · An exam to check for cervical and vaginal cancer. A Pap test is a lab test in which cells are taken from your cervix and sent to the lab to look for signs of  cervical cancer. If cancer of the cervix is found early, chances for a cure are good. Testing can generally end at age 65, or if a woman has a hysterectomy for a benign condition. Your provider may recommend more frequent testing if certain abnormal results are found.    Bone Mass Measurements:  · A painless x-ray of your bone density to see if you are at risk for a broken bone. Bone density refers to the thickness of bones or how tightly the bone tissue is packed.    Preventive Screening tests for Men    Prostate Screening:  · PSA - Prostate Cancer blood test.  Experts do not recommend routine screening of healthy men with no signs or symptoms of prostate disease.  However, men should not ignore urinary symptoms, and should discuss their family history with their doctor.    *Medicare pays for many preventive services to keep you healthy. For some of these services, you might have to pay a deductible, coinsurance, and / or copayment.  The amounts vary depending on the type of services you need and the kind of Medicare health plan you have.               room air

## 2022-11-04 DIAGNOSIS — Z29.9 ENCOUNTER FOR PROPHYLACTIC MEASURES, UNSPECIFIED: ICD-10-CM

## 2022-11-04 DIAGNOSIS — N12 TUBULO-INTERSTITIAL NEPHRITIS, NOT SPECIFIED AS ACUTE OR CHRONIC: ICD-10-CM

## 2022-11-04 DIAGNOSIS — E03.9 HYPOTHYROIDISM, UNSPECIFIED: ICD-10-CM

## 2022-11-04 DIAGNOSIS — E78.5 HYPERLIPIDEMIA, UNSPECIFIED: ICD-10-CM

## 2022-11-04 DIAGNOSIS — N39.0 URINARY TRACT INFECTION, SITE NOT SPECIFIED: ICD-10-CM

## 2022-11-04 DIAGNOSIS — I10 ESSENTIAL (PRIMARY) HYPERTENSION: ICD-10-CM

## 2022-11-04 LAB
ALBUMIN SERPL ELPH-MCNC: 3.6 G/DL — SIGNIFICANT CHANGE UP (ref 3.5–5)
ALP SERPL-CCNC: 41 U/L — SIGNIFICANT CHANGE UP (ref 40–120)
ALT FLD-CCNC: 22 U/L DA — SIGNIFICANT CHANGE UP (ref 10–60)
ANION GAP SERPL CALC-SCNC: 9 MMOL/L — SIGNIFICANT CHANGE UP (ref 5–17)
APPEARANCE UR: CLEAR — SIGNIFICANT CHANGE UP
APTT BLD: 34.2 SEC — SIGNIFICANT CHANGE UP (ref 27.5–35.5)
AST SERPL-CCNC: 15 U/L — SIGNIFICANT CHANGE UP (ref 10–40)
BACTERIA # UR AUTO: ABNORMAL /HPF
BASOPHILS # BLD AUTO: 0.07 K/UL — SIGNIFICANT CHANGE UP (ref 0–0.2)
BASOPHILS NFR BLD AUTO: 1 % — SIGNIFICANT CHANGE UP (ref 0–2)
BILIRUB SERPL-MCNC: 0.2 MG/DL — SIGNIFICANT CHANGE UP (ref 0.2–1.2)
BILIRUB UR-MCNC: NEGATIVE — SIGNIFICANT CHANGE UP
BUN SERPL-MCNC: 15 MG/DL — SIGNIFICANT CHANGE UP (ref 7–18)
CALCIUM SERPL-MCNC: 9 MG/DL — SIGNIFICANT CHANGE UP (ref 8.4–10.5)
CHLORIDE SERPL-SCNC: 110 MMOL/L — HIGH (ref 96–108)
CO2 SERPL-SCNC: 25 MMOL/L — SIGNIFICANT CHANGE UP (ref 22–31)
COLOR SPEC: YELLOW — SIGNIFICANT CHANGE UP
COMMENT - URINE: SIGNIFICANT CHANGE UP
CREAT SERPL-MCNC: 0.88 MG/DL — SIGNIFICANT CHANGE UP (ref 0.5–1.3)
DIFF PNL FLD: NEGATIVE — SIGNIFICANT CHANGE UP
EGFR: 68 ML/MIN/1.73M2 — SIGNIFICANT CHANGE UP
EOSINOPHIL # BLD AUTO: 0.23 K/UL — SIGNIFICANT CHANGE UP (ref 0–0.5)
EOSINOPHIL NFR BLD AUTO: 3.2 % — SIGNIFICANT CHANGE UP (ref 0–6)
EPI CELLS # UR: SIGNIFICANT CHANGE UP /HPF
FLUAV AG NPH QL: SIGNIFICANT CHANGE UP
FLUBV AG NPH QL: SIGNIFICANT CHANGE UP
GLUCOSE SERPL-MCNC: 152 MG/DL — HIGH (ref 70–99)
GLUCOSE UR QL: NEGATIVE — SIGNIFICANT CHANGE UP
HCT VFR BLD CALC: 37.2 % — SIGNIFICANT CHANGE UP (ref 34.5–45)
HGB BLD-MCNC: 11.5 G/DL — SIGNIFICANT CHANGE UP (ref 11.5–15.5)
HYALINE CASTS # UR AUTO: ABNORMAL /LPF
IMM GRANULOCYTES NFR BLD AUTO: 0.1 % — SIGNIFICANT CHANGE UP (ref 0–0.9)
INR BLD: 1.01 RATIO — SIGNIFICANT CHANGE UP (ref 0.88–1.16)
KETONES UR-MCNC: NEGATIVE — SIGNIFICANT CHANGE UP
LEUKOCYTE ESTERASE UR-ACNC: NEGATIVE — SIGNIFICANT CHANGE UP
LYMPHOCYTES # BLD AUTO: 2.96 K/UL — SIGNIFICANT CHANGE UP (ref 1–3.3)
LYMPHOCYTES # BLD AUTO: 40.9 % — SIGNIFICANT CHANGE UP (ref 13–44)
MCHC RBC-ENTMCNC: 26.9 PG — LOW (ref 27–34)
MCHC RBC-ENTMCNC: 30.9 GM/DL — LOW (ref 32–36)
MCV RBC AUTO: 87.1 FL — SIGNIFICANT CHANGE UP (ref 80–100)
MONOCYTES # BLD AUTO: 0.65 K/UL — SIGNIFICANT CHANGE UP (ref 0–0.9)
MONOCYTES NFR BLD AUTO: 9 % — SIGNIFICANT CHANGE UP (ref 2–14)
NEUTROPHILS # BLD AUTO: 3.32 K/UL — SIGNIFICANT CHANGE UP (ref 1.8–7.4)
NEUTROPHILS NFR BLD AUTO: 45.8 % — SIGNIFICANT CHANGE UP (ref 43–77)
NITRITE UR-MCNC: NEGATIVE — SIGNIFICANT CHANGE UP
NRBC # BLD: 0 /100 WBCS — SIGNIFICANT CHANGE UP (ref 0–0)
PH UR: 7 — SIGNIFICANT CHANGE UP (ref 5–8)
PLATELET # BLD AUTO: 248 K/UL — SIGNIFICANT CHANGE UP (ref 150–400)
POTASSIUM SERPL-MCNC: 4.1 MMOL/L — SIGNIFICANT CHANGE UP (ref 3.5–5.3)
POTASSIUM SERPL-SCNC: 4.1 MMOL/L — SIGNIFICANT CHANGE UP (ref 3.5–5.3)
PROT SERPL-MCNC: 7.5 G/DL — SIGNIFICANT CHANGE UP (ref 6–8.3)
PROT UR-MCNC: NEGATIVE — SIGNIFICANT CHANGE UP
PROTHROM AB SERPL-ACNC: 12 SEC — SIGNIFICANT CHANGE UP (ref 10.5–13.4)
RBC # BLD: 4.27 M/UL — SIGNIFICANT CHANGE UP (ref 3.8–5.2)
RBC # FLD: 14.4 % — SIGNIFICANT CHANGE UP (ref 10.3–14.5)
RBC CASTS # UR COMP ASSIST: SIGNIFICANT CHANGE UP /HPF (ref 0–2)
SARS-COV-2 RNA SPEC QL NAA+PROBE: SIGNIFICANT CHANGE UP
SODIUM SERPL-SCNC: 144 MMOL/L — SIGNIFICANT CHANGE UP (ref 135–145)
SP GR SPEC: 1.01 — SIGNIFICANT CHANGE UP (ref 1.01–1.02)
T4 AB SER-ACNC: 14.3 UG/DL — HIGH (ref 4.6–12)
TROPONIN I, HIGH SENSITIVITY RESULT: 6.3 NG/L — SIGNIFICANT CHANGE UP
TSH SERPL-MCNC: 1.32 UU/ML — SIGNIFICANT CHANGE UP (ref 0.34–4.82)
UROBILINOGEN FLD QL: NEGATIVE — SIGNIFICANT CHANGE UP
WBC # BLD: 7.24 K/UL — SIGNIFICANT CHANGE UP (ref 3.8–10.5)
WBC # FLD AUTO: 7.24 K/UL — SIGNIFICANT CHANGE UP (ref 3.8–10.5)
WBC UR QL: SIGNIFICANT CHANGE UP /HPF (ref 0–5)

## 2022-11-04 PROCEDURE — 99223 1ST HOSP IP/OBS HIGH 75: CPT

## 2022-11-04 PROCEDURE — 74177 CT ABD & PELVIS W/CONTRAST: CPT | Mod: 26,MA

## 2022-11-04 PROCEDURE — 12345: CPT | Mod: NC

## 2022-11-04 PROCEDURE — 70450 CT HEAD/BRAIN W/O DYE: CPT | Mod: 26,MA

## 2022-11-04 RX ORDER — IPRATROPIUM/ALBUTEROL SULFATE 18-103MCG
0 AEROSOL WITH ADAPTER (GRAM) INHALATION
Qty: 0 | Refills: 0 | DISCHARGE

## 2022-11-04 RX ORDER — ALBUTEROL 90 UG/1
1 AEROSOL, METERED ORAL EVERY 6 HOURS
Refills: 0 | Status: DISCONTINUED | OUTPATIENT
Start: 2022-11-04 | End: 2022-11-07

## 2022-11-04 RX ORDER — FENOFIBRATE,MICRONIZED 130 MG
145 CAPSULE ORAL DAILY
Refills: 0 | Status: DISCONTINUED | OUTPATIENT
Start: 2022-11-04 | End: 2022-11-07

## 2022-11-04 RX ORDER — LORATADINE 10 MG/1
1 TABLET ORAL
Qty: 0 | Refills: 0 | DISCHARGE

## 2022-11-04 RX ORDER — ASPIRIN/CALCIUM CARB/MAGNESIUM 324 MG
81 TABLET ORAL DAILY
Refills: 0 | Status: DISCONTINUED | OUTPATIENT
Start: 2022-11-04 | End: 2022-11-07

## 2022-11-04 RX ORDER — GABAPENTIN 400 MG/1
300 CAPSULE ORAL THREE TIMES A DAY
Refills: 0 | Status: DISCONTINUED | OUTPATIENT
Start: 2022-11-04 | End: 2022-11-07

## 2022-11-04 RX ORDER — LOSARTAN POTASSIUM 100 MG/1
50 TABLET, FILM COATED ORAL DAILY
Refills: 0 | Status: DISCONTINUED | OUTPATIENT
Start: 2022-11-04 | End: 2022-11-07

## 2022-11-04 RX ORDER — ATORVASTATIN CALCIUM 80 MG/1
10 TABLET, FILM COATED ORAL AT BEDTIME
Refills: 0 | Status: DISCONTINUED | OUTPATIENT
Start: 2022-11-04 | End: 2022-11-07

## 2022-11-04 RX ORDER — SODIUM CHLORIDE 9 MG/ML
1000 INJECTION, SOLUTION INTRAVENOUS
Refills: 0 | Status: DISCONTINUED | OUTPATIENT
Start: 2022-11-04 | End: 2022-11-07

## 2022-11-04 RX ORDER — ACETAMINOPHEN 500 MG
650 TABLET ORAL EVERY 6 HOURS
Refills: 0 | Status: DISCONTINUED | OUTPATIENT
Start: 2022-11-04 | End: 2022-11-07

## 2022-11-04 RX ORDER — ATORVASTATIN CALCIUM 80 MG/1
0 TABLET, FILM COATED ORAL
Qty: 0 | Refills: 0 | DISCHARGE

## 2022-11-04 RX ORDER — GABAPENTIN 400 MG/1
1 CAPSULE ORAL
Qty: 0 | Refills: 0 | DISCHARGE

## 2022-11-04 RX ORDER — OLANZAPINE 15 MG/1
2.5 TABLET, FILM COATED ORAL ONCE
Refills: 0 | Status: COMPLETED | OUTPATIENT
Start: 2022-11-04 | End: 2022-11-04

## 2022-11-04 RX ORDER — CEFTRIAXONE 500 MG/1
1000 INJECTION, POWDER, FOR SOLUTION INTRAMUSCULAR; INTRAVENOUS ONCE
Refills: 0 | Status: COMPLETED | OUTPATIENT
Start: 2022-11-04 | End: 2022-11-04

## 2022-11-04 RX ORDER — CEFTRIAXONE 500 MG/1
1000 INJECTION, POWDER, FOR SOLUTION INTRAMUSCULAR; INTRAVENOUS EVERY 24 HOURS
Refills: 0 | Status: DISCONTINUED | OUTPATIENT
Start: 2022-11-04 | End: 2022-11-07

## 2022-11-04 RX ORDER — ATORVASTATIN CALCIUM 80 MG/1
1 TABLET, FILM COATED ORAL
Qty: 0 | Refills: 0 | DISCHARGE

## 2022-11-04 RX ORDER — METFORMIN HYDROCHLORIDE 850 MG/1
1 TABLET ORAL
Qty: 0 | Refills: 0 | DISCHARGE

## 2022-11-04 RX ORDER — ERGOCALCIFEROL 1.25 MG/1
1 CAPSULE ORAL
Qty: 0 | Refills: 0 | DISCHARGE

## 2022-11-04 RX ORDER — ASPIRIN/CALCIUM CARB/MAGNESIUM 324 MG
0 TABLET ORAL
Qty: 0 | Refills: 0 | DISCHARGE

## 2022-11-04 RX ORDER — TAMSULOSIN HYDROCHLORIDE 0.4 MG/1
0.4 CAPSULE ORAL AT BEDTIME
Refills: 0 | Status: DISCONTINUED | OUTPATIENT
Start: 2022-11-04 | End: 2022-11-07

## 2022-11-04 RX ORDER — LEVOTHYROXINE SODIUM 125 MCG
100 TABLET ORAL DAILY
Refills: 0 | Status: DISCONTINUED | OUTPATIENT
Start: 2022-11-04 | End: 2022-11-07

## 2022-11-04 RX ADMIN — OLANZAPINE 2.5 MILLIGRAM(S): 15 TABLET, FILM COATED ORAL at 10:27

## 2022-11-04 RX ADMIN — TAMSULOSIN HYDROCHLORIDE 0.4 MILLIGRAM(S): 0.4 CAPSULE ORAL at 22:17

## 2022-11-04 RX ADMIN — CEFTRIAXONE 100 MILLIGRAM(S): 500 INJECTION, POWDER, FOR SOLUTION INTRAMUSCULAR; INTRAVENOUS at 22:17

## 2022-11-04 RX ADMIN — SODIUM CHLORIDE 3 MILLILITER(S): 9 INJECTION INTRAMUSCULAR; INTRAVENOUS; SUBCUTANEOUS at 13:34

## 2022-11-04 RX ADMIN — Medication 1000 MILLIGRAM(S): at 01:55

## 2022-11-04 RX ADMIN — SODIUM CHLORIDE 3 MILLILITER(S): 9 INJECTION INTRAMUSCULAR; INTRAVENOUS; SUBCUTANEOUS at 21:31

## 2022-11-04 RX ADMIN — SODIUM CHLORIDE 3 MILLILITER(S): 9 INJECTION INTRAMUSCULAR; INTRAVENOUS; SUBCUTANEOUS at 05:52

## 2022-11-04 RX ADMIN — SODIUM CHLORIDE 1000 MILLILITER(S): 9 INJECTION INTRAMUSCULAR; INTRAVENOUS; SUBCUTANEOUS at 00:40

## 2022-11-04 RX ADMIN — ATORVASTATIN CALCIUM 10 MILLIGRAM(S): 80 TABLET, FILM COATED ORAL at 22:17

## 2022-11-04 RX ADMIN — Medication 400 MILLIGRAM(S): at 01:55

## 2022-11-04 RX ADMIN — Medication 1.25 MILLIGRAM(S): at 22:12

## 2022-11-04 RX ADMIN — OLANZAPINE 2.5 MILLIGRAM(S): 15 TABLET, FILM COATED ORAL at 07:28

## 2022-11-04 RX ADMIN — Medication 1000 MILLIGRAM(S): at 02:25

## 2022-11-04 RX ADMIN — CEFTRIAXONE 100 MILLIGRAM(S): 500 INJECTION, POWDER, FOR SOLUTION INTRAMUSCULAR; INTRAVENOUS at 02:43

## 2022-11-04 RX ADMIN — GABAPENTIN 300 MILLIGRAM(S): 400 CAPSULE ORAL at 22:17

## 2022-11-04 RX ADMIN — CEFTRIAXONE 1000 MILLIGRAM(S): 500 INJECTION, POWDER, FOR SOLUTION INTRAMUSCULAR; INTRAVENOUS at 02:43

## 2022-11-04 NOTE — H&P ADULT - PROBLEM SELECTOR PLAN 1
on prior admission found to be positive for Proteous pan-sensitive  admit to medicine  LR 70 cc/hr X 12 hrs  Ceftriaxone 1 gr q 24 hrs  f/u UCX and BCX

## 2022-11-04 NOTE — CHART NOTE - NSCHARTNOTEFT_GEN_A_CORE
EVENT: Pt noted with agitation, trying to leave the EDH area. Attempts made to calm pt down with no avail.     OBJECTIVE:  Vital Signs Last 24 Hrs  T(C): 36.8 (04 Nov 2022 11:30), Max: 37.2 (03 Nov 2022 22:15)  T(F): 98.2 (04 Nov 2022 11:30), Max: 99 (03 Nov 2022 22:15)  HR: 90 (04 Nov 2022 11:30) (82 - 90)  BP: 169/82 (04 Nov 2022 11:30) (166/95 - 178/87)  BP(mean): --  RR: 18 (04 Nov 2022 11:30) (16 - 18)  SpO2: 97% (04 Nov 2022 11:30) (96% - 98%)    Parameters below as of 04 Nov 2022 11:30  Patient On (Oxygen Delivery Method): room air        FOCUSED PHYSICAL EXAM:  Neuro: A&O x1  Cardiovascular: Pulses +2 B/L in lower and upper extremities, HR regular, BP stable, No edema.  Respiratory: Respirations regular, unlabored, breath sounds clear B/L.     I&O's      LABS:                        11.5   7.24  )-----------( 248      ( 04 Nov 2022 00:40 )             37.2     11-04    144  |  110<H>  |  15  ----------------------------<  152<H>  4.1   |  25  |  0.88    Ca    9.0      04 Nov 2022 00:40    TPro  7.5  /  Alb  3.6  /  TBili  0.2  /  DBili  x   /  AST  15  /  ALT  22  /  AlkPhos  41  11-04                    ASSESSMENT:  A/P   78 yo F from home [place]ian speaker w / pmhx L UPJ calculus s/p cysto, stent March 2021, HTN, DM, hypothyroid, was brought by her daughter because of concerns of worsening generalized weakness, decreased oral intake and abdominal discomfort as per triage note.       PLAN: Zyprexa 2.5 mh IM given           Maintain safety           Encourage pt's daughter to be around to decrease anxiety & agitation    FOLLOW UP / RESULT:   - Monitor effectiveness of Zyprexa    - Discussed above plan with attending Dr Richard.

## 2022-11-04 NOTE — ED ADULT NURSE REASSESSMENT NOTE - NS ED NURSE REASSESS COMMENT FT1
Pt received from JANUSZ Jung at 0700, confused but calm, sitting on chair, ambulated to bathroom and breakfast provided. Daughter at bed side. Fall precautions maintained.

## 2022-11-04 NOTE — H&P ADULT - ASSESSMENT
78 yo F from home [facility] speaker w / pmhx L UPJ calculus s/p cysto, stent March 2021, HTN, DM, hypothyroid. coming in with 1 days hx of confusion and decreased PO intake. In the ED with VSS, on labs with bland UA.. CTH showing no acute IC pathology, mild cerebral volume loss, moderate chronic microvascular changes. CT A/P showing cystitis with left sided ascending ureteritis and pyelonephritis. Interval resolution of obstructing proximal left ureteral stone, left hydronephrosis and perinephric stranding. New focal airspace opacity medial right lower lobe which could represent atelectasis v/s pneumonia.  Pt is being admitted for pyelonephritis.

## 2022-11-04 NOTE — CHART NOTE - NSCHARTNOTEFT_GEN_A_CORE
Patient is a 77y old  Female who presents with a chief complaint of altered mental status     Patient was seen and examined with cousin at bedside   Patient is sleepy after Zyprexa, cousin reports patient was severely agitated before     INTERVAL HPI/OVERNIGHT EVENTS:  T(C): 36.8 (22 @ 11:30), Max: 37.2 (22 @ 22:15)  HR: 90 (22 @ 11:30) (82 - 90)  BP: 169/82 (22 @ 11:30) (166/95 - 178/87)  RR: 18 (22 @ 11:30) (16 - 18)  SpO2: 97% (22 @ 11:30) (96% - 98%)  Wt(kg): --  I&O's Summary      REVIEW OF SYSTEMS: not obtained     MEDICATIONS  (STANDING):  aspirin enteric coated 81 milliGRAM(s) Oral daily  atorvastatin 10 milliGRAM(s) Oral at bedtime  cefTRIAXone   IVPB 1000 milliGRAM(s) IV Intermittent every 24 hours  fenofibrate Tablet 145 milliGRAM(s) Oral daily  gabapentin 300 milliGRAM(s) Oral three times a day  lactated ringers. 1000 milliLiter(s) (70 mL/Hr) IV Continuous <Continuous>  levothyroxine 100 MICROGram(s) Oral daily  losartan 50 milliGRAM(s) Oral daily  sodium chloride 0.9% lock flush 3 milliLiter(s) IV Push every 8 hours  tamsulosin 0.4 milliGRAM(s) Oral at bedtime    MEDICATIONS  (PRN):  acetaminophen     Tablet .. 650 milliGRAM(s) Oral every 6 hours PRN Temp greater or equal to 38C (100.4F), Mild Pain (1 - 3)  ALBUTerol    90 MICROgram(s) HFA Inhaler 1 Puff(s) Inhalation every 6 hours PRN Bronchospasm      PHYSICAL EXAM:  GENERAL: NAD, well-groomed, well-developed  NERVOUS SYSTEM: sleepy, unable to assess mental status and was not able to follow commands  CHEST/LUNG: Clear to auscultation anterolaterally  HEART: Regular rate and rhythm; No murmurs, rubs, or gallops  ABDOMEN: Soft, supra pubic tenderness, Nondistended; Bowel sounds present  EXTREMITIES:  2+ Peripheral Pulses, No clubbing, cyanosis, or edema  SKIN: No rashes or lesions    LABS:                        11.5   7.24  )-----------( 248      ( 2022 00:40 )             37.2     11-    144  |  110<H>  |  15  ----------------------------<  152<H>  4.1   |  25  |  0.88    Ca    9.0      2022 00:40    TPro  7.5  /  Alb  3.6  /  TBili  0.2  /  DBili  x   /  AST  15  /  ALT  22  /  AlkPhos  41  11-04    PT/INR - ( 2022 00:40 )   PT: 12.0 sec;   INR: 1.01 ratio         PTT - ( 2022 00:40 )  PTT:34.2 sec  Urinalysis Basic - ( 2022 00:40 )    Color: Yellow / Appearance: Clear / S.010 / pH: x  Gluc: x / Ketone: Negative  / Bili: Negative / Urobili: Negative   Blood: x / Protein: Negative / Nitrite: Negative   Leuk Esterase: Negative / RBC: 0-2 /HPF / WBC 0-2 /HPF   Sq Epi: x / Non Sq Epi: Few /HPF / Bacteria: Moderate /HPF    A/P:  Acute metabolic encephalopathy due to UTI, H/o Urosepsis 2/2 obstructive uropathy s/p stent placement on 2021 and Proteus bacteremia, was pansensitive. Will cont Ceftriaxone. Follow cultures  Avoid anticholinergics, benzodiazepines, limit lines/tubes/tethers/restraints, encourage frequent reorientation/reassurance by staff, encourage good sleep hygiene, adequate lighting  Cont home meds for chronic conditions. Unable to reach daughter, left VM

## 2022-11-04 NOTE — H&P ADULT - NSHPPHYSICALEXAM_GEN_ALL_CORE
LOS:     VITALS:   T(C): 37.2 (11-03-22 @ 22:15), Max: 37.2 (11-03-22 @ 22:15)  HR: 82 (11-03-22 @ 22:15) (82 - 82)  BP: 166/95 (11-03-22 @ 22:15) (166/95 - 166/95)  RR: 16 (11-03-22 @ 22:15) (16 - 16)  SpO2: 97% (11-03-22 @ 22:15) (97% - 97%)    GENERAL: combative  COULD NOT PERFORM PHYSICAL EXAM AS PT WAS COMBATIVE, AGGRESSIVE TOWARDS PROVIDER. SHE DID NOT LET ME EXAM HER.

## 2022-11-04 NOTE — H&P ADULT - HISTORY OF PRESENT ILLNESS
78 yo F from home Thai speaker w / pmhx L UPJ calculus s/p cysto, stent March 2021, HTN, DM, hypothyroid, was brought by her daughter because of concerns of worsening generalized weakness, decreased oral intake and abdominal discomfort as per triage note. At the time of my assessment pt was by herself. With the help of Thai  I asked the pt about why she had come to the hospital, however, she was refusing to answer questions. Was not able to obtain collateral information from family member.     ED course  /95  HR 82  RR 16, O2SAT 97 % RA  s/p Ceftriaxone 1 gr + 1 L NS

## 2022-11-04 NOTE — ED ADULT NURSE REASSESSMENT NOTE - NS ED NURSE REASSESS COMMENT FT1
pt awake, restless and wandering the hallway. Pt appears angry and combative. Redirection as needed. MAR made aware. Awaiting orders.

## 2022-11-04 NOTE — ED ADULT NURSE NOTE - NSIMPLEMENTINTERV_GEN_ALL_ED
Implemented All Fall Risk Interventions:  [place] to call system. Call bell, personal items and telephone within reach. Instruct patient to call for assistance. Room bathroom lighting operational. Non-slip footwear when patient is off stretcher. Physically safe environment: no spills, clutter or unnecessary equipment. Stretcher in lowest position, wheels locked, appropriate side rails in place. Provide visual cue, wrist band, yellow gown, etc. Monitor gait and stability. Monitor for mental status changes and reorient to person, place, and time. Review medications for side effects contributing to fall risk. Reinforce activity limits and safety measures with patient and family.

## 2022-11-04 NOTE — H&P ADULT - ATTENDING COMMENTS
Pt known to ur service from prior admission ( in August 2022) for complicated UTI with obstructive uropathy for which a left ureteral stent was placed. The stent has since been removed.   She is a 77 year old woman with PMH of DM, HTN, hypothyroidism brought in today on account of weakness, decreased intake and abdominal pain. Pt herself not able give hx even with the help of Hungarian .    Vital Signs Last 24 Hrs  T(C): 36.7 (04 Nov 2022 08:11), Max: 37.2 (03 Nov 2022 22:15)  T(F): 98 (04 Nov 2022 08:11), Max: 99 (03 Nov 2022 22:15)  HR: 84 (04 Nov 2022 08:11) (82 - 86)  BP: 169/82 (04 Nov 2022 08:11) (166/95 - 178/87)  RR: 18 (04 Nov 2022 08:11) (16 - 18)  SpO2: 98% (04 Nov 2022 08:11) (96% - 98%)    Exam  Pt uncooperative at this time  deferred    labs reviewed                        11.5   7.24  )-----------( 248      ( 04 Nov 2022 00:40 )             37.2     11-04    144  |  110<H>  |  15  ----------------------------<  152<H>  4.1   |  25  |  0.88    Ca    9.0      04 Nov 2022 00:40    TPro  7.5  /  Alb  3.6  /  TBili  0.2  /  DBili  x   /  AST  15  /  ALT  22  /  AlkPhos  41  11-04    T4 - 14.3  CT head - unremarkable   CT abdomen / pelvis   Cystitis with left-sided ascending ureteritis and pyelonephritis.   Interval resolution of obstructing proximal left ureteral stone, left hydronephrosis and perinephric stranding.    New focal airspace opacity medial right lower lobe could represent   atelectasis or pneumonia. Consider follow-up chest CT in one month to   reassess for resolution.    Impression   - Acute encephalopathy   likely related to sepsis with new left sided pyelonephritis  No evidence of metabolic or hypoxic ischemic etiology  Check TSH; T4 noted  - Sepsis with left sided pyelonephritis    Plan   Admit to Medicine   Sepsis work up   Prior urine culture noted  Empiric antibiotics for UTI   Gentle IVF hydration   Keep NPO for now with bedside dysphagia screen  Resume home meds once dysphagia screen passed  Monitor mental status for improvement

## 2022-11-05 LAB
ANION GAP SERPL CALC-SCNC: 12 MMOL/L — SIGNIFICANT CHANGE UP (ref 5–17)
BUN SERPL-MCNC: 11 MG/DL — SIGNIFICANT CHANGE UP (ref 7–18)
CALCIUM SERPL-MCNC: 9.5 MG/DL — SIGNIFICANT CHANGE UP (ref 8.4–10.5)
CHLORIDE SERPL-SCNC: 106 MMOL/L — SIGNIFICANT CHANGE UP (ref 96–108)
CO2 SERPL-SCNC: 24 MMOL/L — SIGNIFICANT CHANGE UP (ref 22–31)
CREAT SERPL-MCNC: 0.73 MG/DL — SIGNIFICANT CHANGE UP (ref 0.5–1.3)
CULTURE RESULTS: SIGNIFICANT CHANGE UP
EGFR: 85 ML/MIN/1.73M2 — SIGNIFICANT CHANGE UP
GLUCOSE SERPL-MCNC: 161 MG/DL — HIGH (ref 70–99)
HCT VFR BLD CALC: 37.8 % — SIGNIFICANT CHANGE UP (ref 34.5–45)
HGB BLD-MCNC: 11.9 G/DL — SIGNIFICANT CHANGE UP (ref 11.5–15.5)
MCHC RBC-ENTMCNC: 26.3 PG — LOW (ref 27–34)
MCHC RBC-ENTMCNC: 31.5 GM/DL — LOW (ref 32–36)
MCV RBC AUTO: 83.4 FL — SIGNIFICANT CHANGE UP (ref 80–100)
MRSA PCR RESULT.: SIGNIFICANT CHANGE UP
NRBC # BLD: 0 /100 WBCS — SIGNIFICANT CHANGE UP (ref 0–0)
PLATELET # BLD AUTO: 266 K/UL — SIGNIFICANT CHANGE UP (ref 150–400)
POTASSIUM SERPL-MCNC: 3.1 MMOL/L — LOW (ref 3.5–5.3)
POTASSIUM SERPL-SCNC: 3.1 MMOL/L — LOW (ref 3.5–5.3)
RBC # BLD: 4.53 M/UL — SIGNIFICANT CHANGE UP (ref 3.8–5.2)
RBC # FLD: 14.2 % — SIGNIFICANT CHANGE UP (ref 10.3–14.5)
S AUREUS DNA NOSE QL NAA+PROBE: SIGNIFICANT CHANGE UP
SODIUM SERPL-SCNC: 142 MMOL/L — SIGNIFICANT CHANGE UP (ref 135–145)
SPECIMEN SOURCE: SIGNIFICANT CHANGE UP
WBC # BLD: 9.26 K/UL — SIGNIFICANT CHANGE UP (ref 3.8–10.5)
WBC # FLD AUTO: 9.26 K/UL — SIGNIFICANT CHANGE UP (ref 3.8–10.5)

## 2022-11-05 PROCEDURE — 99233 SBSQ HOSP IP/OBS HIGH 50: CPT

## 2022-11-05 RX ORDER — POTASSIUM CHLORIDE 20 MEQ
40 PACKET (EA) ORAL EVERY 4 HOURS
Refills: 0 | Status: COMPLETED | OUTPATIENT
Start: 2022-11-05 | End: 2022-11-05

## 2022-11-05 RX ORDER — OLANZAPINE 15 MG/1
2.5 TABLET, FILM COATED ORAL ONCE
Refills: 0 | Status: COMPLETED | OUTPATIENT
Start: 2022-11-05 | End: 2022-11-05

## 2022-11-05 RX ORDER — HYDRALAZINE HCL 50 MG
5 TABLET ORAL ONCE
Refills: 0 | Status: COMPLETED | OUTPATIENT
Start: 2022-11-05 | End: 2022-11-05

## 2022-11-05 RX ORDER — POTASSIUM CHLORIDE 20 MEQ
40 PACKET (EA) ORAL ONCE
Refills: 0 | Status: DISCONTINUED | OUTPATIENT
Start: 2022-11-05 | End: 2022-11-05

## 2022-11-05 RX ADMIN — SODIUM CHLORIDE 3 MILLILITER(S): 9 INJECTION INTRAMUSCULAR; INTRAVENOUS; SUBCUTANEOUS at 05:51

## 2022-11-05 RX ADMIN — Medication 40 MILLIEQUIVALENT(S): at 11:20

## 2022-11-05 RX ADMIN — SODIUM CHLORIDE 70 MILLILITER(S): 9 INJECTION, SOLUTION INTRAVENOUS at 22:06

## 2022-11-05 RX ADMIN — GABAPENTIN 300 MILLIGRAM(S): 400 CAPSULE ORAL at 05:54

## 2022-11-05 RX ADMIN — Medication 40 MILLIEQUIVALENT(S): at 15:05

## 2022-11-05 RX ADMIN — LOSARTAN POTASSIUM 50 MILLIGRAM(S): 100 TABLET, FILM COATED ORAL at 05:54

## 2022-11-05 RX ADMIN — Medication 81 MILLIGRAM(S): at 11:21

## 2022-11-05 RX ADMIN — OLANZAPINE 2.5 MILLIGRAM(S): 15 TABLET, FILM COATED ORAL at 14:54

## 2022-11-05 RX ADMIN — CEFTRIAXONE 100 MILLIGRAM(S): 500 INJECTION, POWDER, FOR SOLUTION INTRAMUSCULAR; INTRAVENOUS at 22:13

## 2022-11-05 RX ADMIN — Medication 100 MICROGRAM(S): at 05:54

## 2022-11-05 RX ADMIN — Medication 145 MILLIGRAM(S): at 13:06

## 2022-11-05 RX ADMIN — Medication 5 MILLIGRAM(S): at 01:41

## 2022-11-05 RX ADMIN — GABAPENTIN 300 MILLIGRAM(S): 400 CAPSULE ORAL at 15:05

## 2022-11-05 RX ADMIN — SODIUM CHLORIDE 3 MILLILITER(S): 9 INJECTION INTRAMUSCULAR; INTRAVENOUS; SUBCUTANEOUS at 23:10

## 2022-11-05 NOTE — PATIENT PROFILE ADULT - FALL HARM RISK - HARM RISK INTERVENTIONS

## 2022-11-06 ENCOUNTER — TRANSCRIPTION ENCOUNTER (OUTPATIENT)
Age: 77
End: 2022-11-06

## 2022-11-06 LAB
24R-OH-CALCIDIOL SERPL-MCNC: 51.4 NG/ML — SIGNIFICANT CHANGE UP (ref 30–80)
ANION GAP SERPL CALC-SCNC: 11 MMOL/L — SIGNIFICANT CHANGE UP (ref 5–17)
BUN SERPL-MCNC: 15 MG/DL — SIGNIFICANT CHANGE UP (ref 7–18)
CALCIUM SERPL-MCNC: 9.5 MG/DL — SIGNIFICANT CHANGE UP (ref 8.4–10.5)
CHLORIDE SERPL-SCNC: 109 MMOL/L — HIGH (ref 96–108)
CO2 SERPL-SCNC: 22 MMOL/L — SIGNIFICANT CHANGE UP (ref 22–31)
CREAT SERPL-MCNC: 0.99 MG/DL — SIGNIFICANT CHANGE UP (ref 0.5–1.3)
EGFR: 59 ML/MIN/1.73M2 — LOW
FOLATE SERPL-MCNC: 11.3 NG/ML — SIGNIFICANT CHANGE UP
GLUCOSE SERPL-MCNC: 138 MG/DL — HIGH (ref 70–99)
HCT VFR BLD CALC: 39.3 % — SIGNIFICANT CHANGE UP (ref 34.5–45)
HGB BLD-MCNC: 12.2 G/DL — SIGNIFICANT CHANGE UP (ref 11.5–15.5)
MCHC RBC-ENTMCNC: 26.5 PG — LOW (ref 27–34)
MCHC RBC-ENTMCNC: 31 GM/DL — LOW (ref 32–36)
MCV RBC AUTO: 85.4 FL — SIGNIFICANT CHANGE UP (ref 80–100)
NRBC # BLD: 0 /100 WBCS — SIGNIFICANT CHANGE UP (ref 0–0)
PLATELET # BLD AUTO: 298 K/UL — SIGNIFICANT CHANGE UP (ref 150–400)
POTASSIUM SERPL-MCNC: 3.6 MMOL/L — SIGNIFICANT CHANGE UP (ref 3.5–5.3)
POTASSIUM SERPL-SCNC: 3.6 MMOL/L — SIGNIFICANT CHANGE UP (ref 3.5–5.3)
RBC # BLD: 4.6 M/UL — SIGNIFICANT CHANGE UP (ref 3.8–5.2)
RBC # FLD: 14.5 % — SIGNIFICANT CHANGE UP (ref 10.3–14.5)
SODIUM SERPL-SCNC: 142 MMOL/L — SIGNIFICANT CHANGE UP (ref 135–145)
VIT B12 SERPL-MCNC: 423 PG/ML — SIGNIFICANT CHANGE UP (ref 232–1245)
WBC # BLD: 8.86 K/UL — SIGNIFICANT CHANGE UP (ref 3.8–10.5)
WBC # FLD AUTO: 8.86 K/UL — SIGNIFICANT CHANGE UP (ref 3.8–10.5)

## 2022-11-06 PROCEDURE — 99233 SBSQ HOSP IP/OBS HIGH 50: CPT

## 2022-11-06 RX ORDER — OLANZAPINE 15 MG/1
2.5 TABLET, FILM COATED ORAL ONCE
Refills: 0 | Status: COMPLETED | OUTPATIENT
Start: 2022-11-06 | End: 2022-11-06

## 2022-11-06 RX ORDER — OLANZAPINE 15 MG/1
2.5 TABLET, FILM COATED ORAL
Refills: 0 | Status: DISCONTINUED | OUTPATIENT
Start: 2022-11-06 | End: 2022-11-07

## 2022-11-06 RX ORDER — INFLUENZA VIRUS VACCINE 15; 15; 15; 15 UG/.5ML; UG/.5ML; UG/.5ML; UG/.5ML
0.7 SUSPENSION INTRAMUSCULAR ONCE
Refills: 0 | Status: COMPLETED | OUTPATIENT
Start: 2022-11-06 | End: 2022-11-06

## 2022-11-06 RX ORDER — ENOXAPARIN SODIUM 100 MG/ML
40 INJECTION SUBCUTANEOUS EVERY 24 HOURS
Refills: 0 | Status: DISCONTINUED | OUTPATIENT
Start: 2022-11-06 | End: 2022-11-07

## 2022-11-06 RX ADMIN — SODIUM CHLORIDE 3 MILLILITER(S): 9 INJECTION INTRAMUSCULAR; INTRAVENOUS; SUBCUTANEOUS at 22:16

## 2022-11-06 RX ADMIN — Medication 145 MILLIGRAM(S): at 12:09

## 2022-11-06 RX ADMIN — GABAPENTIN 300 MILLIGRAM(S): 400 CAPSULE ORAL at 12:09

## 2022-11-06 RX ADMIN — OLANZAPINE 2.5 MILLIGRAM(S): 15 TABLET, FILM COATED ORAL at 23:34

## 2022-11-06 RX ADMIN — SODIUM CHLORIDE 3 MILLILITER(S): 9 INJECTION INTRAMUSCULAR; INTRAVENOUS; SUBCUTANEOUS at 05:20

## 2022-11-06 RX ADMIN — SODIUM CHLORIDE 70 MILLILITER(S): 9 INJECTION, SOLUTION INTRAVENOUS at 12:09

## 2022-11-06 RX ADMIN — OLANZAPINE 2.5 MILLIGRAM(S): 15 TABLET, FILM COATED ORAL at 18:05

## 2022-11-06 RX ADMIN — ATORVASTATIN CALCIUM 10 MILLIGRAM(S): 80 TABLET, FILM COATED ORAL at 22:15

## 2022-11-06 RX ADMIN — GABAPENTIN 300 MILLIGRAM(S): 400 CAPSULE ORAL at 22:16

## 2022-11-06 RX ADMIN — Medication 100 MICROGRAM(S): at 05:16

## 2022-11-06 RX ADMIN — Medication 81 MILLIGRAM(S): at 12:09

## 2022-11-06 RX ADMIN — GABAPENTIN 300 MILLIGRAM(S): 400 CAPSULE ORAL at 05:16

## 2022-11-06 RX ADMIN — ENOXAPARIN SODIUM 40 MILLIGRAM(S): 100 INJECTION SUBCUTANEOUS at 12:10

## 2022-11-06 RX ADMIN — SODIUM CHLORIDE 3 MILLILITER(S): 9 INJECTION INTRAMUSCULAR; INTRAVENOUS; SUBCUTANEOUS at 12:04

## 2022-11-06 RX ADMIN — CEFTRIAXONE 100 MILLIGRAM(S): 500 INJECTION, POWDER, FOR SOLUTION INTRAMUSCULAR; INTRAVENOUS at 22:15

## 2022-11-06 RX ADMIN — LOSARTAN POTASSIUM 50 MILLIGRAM(S): 100 TABLET, FILM COATED ORAL at 05:16

## 2022-11-06 RX ADMIN — TAMSULOSIN HYDROCHLORIDE 0.4 MILLIGRAM(S): 0.4 CAPSULE ORAL at 22:15

## 2022-11-06 NOTE — PROGRESS NOTE ADULT - SUBJECTIVE AND OBJECTIVE BOX
LATE ENTRY NOTE FOR 22    Patient seen and examined this morning around 11 AM later met with Niece at bedside in the afternoon;  Patient interviewed with [facility]  Ti ID# [id number](Diallo)    77y old  Female who presents with a chief complaint of altered mental status    Patient with multiple p[rior admission over the past 2 years with bacteremia first in 2021 ([facility]) and Aug 2022 ([facility]) with Hydronephrosis needing stent which was recently removed outpt 3 weeks ago. Patient was reported to be agitated after admission needing couple of times Zyprexa     Patient alert and awake, intermittently getting out of bed to go to bathroom. She knows she is in hospital. was able to recall PCP name correctly and states in [place]. reported living with son and family. Reported by ACP that patient pulled out saline lock this morning    Patient participated in history taking; denies fever, chills, SOB, palpitations, chest pain, nausea, vomiting, diarrhea. did point to suprapubic area some discomfort and right thigh pain. wanted to take a shower    MEDICATIONS  (STANDING):  aspirin enteric coated 81 milliGRAM(s) Oral daily  atorvastatin 10 milliGRAM(s) Oral at bedtime  cefTRIAXone   IVPB 1000 milliGRAM(s) IV Intermittent every 24 hours  fenofibrate Tablet 145 milliGRAM(s) Oral daily  gabapentin 300 milliGRAM(s) Oral three times a day  lactated ringers. 1000 milliLiter(s) (70 mL/Hr) IV Continuous <Continuous>  levothyroxine 100 MICROGram(s) Oral daily  losartan 50 milliGRAM(s) Oral daily  sodium chloride 0.9% lock flush 3 milliLiter(s) IV Push every 8 hours  tamsulosin 0.4 milliGRAM(s) Oral at bedtime    MEDICATIONS  (PRN):  acetaminophen     Tablet .. 650 milliGRAM(s) Oral every 6 hours PRN Temp greater or equal to 38C (100.4F), Mild Pain (1 - 3)  ALBUTerol    90 MICROgram(s) HFA Inhaler 1 Puff(s) Inhalation every 6 hours PRN Bronchospasm    Vital Signs Last 24 Hrs  T(C): 36.9 (2022 21:01), Max: 36.9 (2022 21:01)  T(F): 98.4 (2022 21:01), Max: 98.4 (2022 21:01)  HR: 85 (2022 21:01) (85 - 101)  BP: 108/63 (2022 21:01) (108/63 - 159/88)  BP(mean): 89 (2022 04:25) (89 - 89)  RR: 18 (2022 21:01) (18 - 19)  SpO2: 95% (2022 21:01) (95% - 98%): room air    PHYSICAL EXAM:  GENERAL: NAD, well-groomed, well-developed  NERVOUS SYSTEM: Alert, awake and oriented x 2; full   CHEST/LUNG: Clear to auscultation bilaterally  HEART: Regular rate and rhythm; No murmurs, rubs, or gallops  ABDOMEN: Soft, very mild supra pubic tenderness, Nondistended; Bowel sounds present  EXTREMITIES:  2+ Peripheral Pulses, No clubbing, cyanosis, or edema  SKIN: No rashes or lesions    LABS:                                    11.9   9.26  )-----------( 266      ( 2022 05:45 )             37.8   11-05    142  |  106  |  11  ----------------------------<  161<H>  3.1<L>   |  24  |  0.73    Ca    9.5      2022 05:45  TPro  7.5  /  Alb  3.6  /  TBili  0.2  /  DBili  x   /  AST  15  /  ALT  22  /  AlkPhos  41  11-04    Thyroid Stimulating Hormone, Serum (22 @ 00:40) Thyroid Stimulating Hormone, Serum: 1.32 uU/mL   Color: Yellow / Appearance: Clear / S.010 / pH: x  Gluc: x / Ketone: Negative  / Bili: Negative / Urobili: Negative   Blood: x / Protein: Negative / Nitrite: Negative   Leuk Esterase: Negative / RBC: 0-2 /HPF / WBC 0-2 /HPF   Sq Epi: x / Non Sq Epi: Few /HPF / Bacteria: Moderate /HPF    Culture - Urine (22 @ 00:40)   Specimen Source: Clean Catch Clean Catch (Midstream)   Culture Results: <10,000 CFU/mL Normal Urogenital Dulce    

## 2022-11-06 NOTE — DISCHARGE NOTE PROVIDER - HOSPITAL COURSE
History of Present Illness:    76 yo F from home [facility] speaker w / pmhx L UPJ calculus s/p cysto, stent March 2021, HTN, DM, hypothyroid, was brought by her daughter because of concerns of worsening generalized weakness, decreased oral intake and abdominal discomfort. Admitted for Metabolic Encephalopathy 2/2 suspected UTI, started on Ceftriaxone. Also found to have Hypokalemia likely in the setting of decreased PO intake. Patient with a couple of episodes of agitation requiring Zyprexa but much improved. PT reccs home PT      INCOMPLETE 11/6      Given clinical course decision made to discharge patient. Discharge discussed with attending.  This is just a brief course for full course please refer to daily progress and consult notes. History of Present Illness:    76 yo F from home [facility] speaker w / pmhx L UPJ calculus s/p cysto, stent March 2021, HTN, DM, hypothyroid, was brought by her daughter because of concerns of worsening generalized weakness, decreased oral intake and abdominal discomfort. Admitted for Metabolic Encephalopathy 2/2 suspected UTI, started on Ceftriaxone. Also found to have Hypokalemia likely in the setting of decreased PO intake. Patient with a couple of episodes of agitation requiring Zyprexa but much improved. PT recommended home PT          Given clinical course decision made to discharge patient. Discharge discussed with attending.  This is just a brief course for full course please refer to daily progress and consult notes. History of Present Illness:   78 yo F from home [facility] speaker with PMH of L UPJ calculus s/p cysto, stent March 2021, HTN, DM, hypothyroid, was brought by her daughter because of concerns of worsening generalized weakness, decreased oral intake and abdominal discomfort. Admitted for Metabolic Encephalopathy 2/2 suspected UTI, started on Ceftriaxone. Also found to have Hypokalemia likely in the setting of decreased PO intake. Patient with a couple of episodes of agitation requiring Zyprexa but much improved. PT recommended home PT          Given clinical course decision made to discharge patient. Discharge discussed with attending.  This is just a brief course for full course please refer to daily progress and consult notes.

## 2022-11-06 NOTE — DISCHARGE NOTE PROVIDER - PROVIDER TOKENS
FREE:[LAST:[Andrea],FIRST:[Vinay],PHONE:[phone]],FAX:[(   )    -],ADDRESS:[[address] [place] [address]]] FREE:[LAST:[Mahbub],FIRST:[Vinay],PHONE:[phone]],FAX:[(   )    -],ADDRESS:[[address] [place] [address]]],PROVIDER:[TOKEN:[[id number]],FOLLOWUP:[2 weeks]]

## 2022-11-06 NOTE — DISCHARGE NOTE PROVIDER - NSDCHHNEEDSERVICE_GEN_ALL_CORE
Observation and assessment Medication teaching and assessment/Observation and assessment/Rehabilitation services

## 2022-11-06 NOTE — DISCHARGE NOTE PROVIDER - CARE PROVIDER_API CALL
Vinay Mendez  [address] [place] [address]  Phone: [phone]  Fax: (   )    -  Follow Up Time:    Vinay Mendez  [address] [place] [address]  Phone: [phone]  Fax: (   )    -  Follow Up Time:     La Marquez)  Clinical Neurophysiology; Neurology; Sleep Medicine  [address] [facility], [address] [place] [address]  Phone: [phone]  Fax: [phone]  Follow Up Time: 2 weeks

## 2022-11-06 NOTE — DISCHARGE NOTE PROVIDER - NSDCCAREPROVSEEN_GEN_ALL_CORE_FT
Pritesh Narvaez Brice, [facility], Brian Richard, [facility]

## 2022-11-06 NOTE — PROGRESS NOTE ADULT - ASSESSMENT
A/P:  Acute metabolic/ infectious encephalopathy due to suspected UTI,   H/o recurrent Bacteremia with Sepsis 2/2 obstructive uropathy and complicated UTI s/p stent placement on 8/17/ 2021  Hypokalemia likely poor oral intake since hospital stay  Type 2 DM  HTN controlled  Hyperlipidemia  Hypothyroid    Plan:  Patient did not had Blood Cx done on admission; Send two sets Cx this morning  Patient was reoriented to her surroundings and hospitalization and was note dot be calm through the afternoon  Continue IV fluids x 24 hrs more  Urine Cx negative; Follow up Blood Cx given prior similar presentation and noted joesph Bacteremic especially recent stent removal  Please avoid sedatives and Benzos which can incite a vicious cycle and complicate the hospital course  As d/w Daughter and requested; will place on low dose Zyprexa 2.5 mg around 7.30 PM so patient can go to sleep  Continue gabapentin but will consider taper down if remain agitated  Insulin sliding scale  Continue ASA/ Statin/ Synthroid/ Losartan  Holding oral hypoglycemics for now  Continue Flomax  PT eval appreciated; d/w Therapist Fern; Home PT  DVT ppx  PT eval; Fall precautions  Patient may take shower with staff assistance; d/w Nursing staff    Discussed with covering FUNMILAYO Rodriguez and RN A/P:  Acute metabolic/ infectious encephalopathy due to suspected UTI,   H/o recurrent Bacteremia with Sepsis 2/2 obstructive uropathy and complicated UTI s/p stent placement on 8/17/ 2021  Hypokalemia likely poor oral intake since hospital stay  Type 2 DM  HTN controlled  Hyperlipidemia  Hypothyroid    Plan:  Patient did not had Blood Cx done on admission; Send two sets Cx this morning  Patient was reoriented to her surroundings and hospitalization again; calmer daughter request if she cna get a Psych consult or medication to keep her calm.   As d/w Daughter and requested; will place on low dose Zyprexa 2.5 mg around 7.30 PM so patient can go to [facility] add Zyprexa 2.5 mg around 7.30 PM  Continue IV fluids x 24 hrs more  Urine Cx negative; Follow up Blood Cx given prior similar presentation and noted to be Bacteremic especially recent stent removal  Please avoid sedatives and Benzos which can incite a vicious cycle and complicate the hospital course  Continue Gabapentin but will consider taper down if remain agitated  Insulin sliding scale  Continue ASA/ Statin/ Synthroid/ Losartan  Holding oral hypoglycemics for now  Continue Flomax  PT eval appreciated; d/w Therapist Fern; Home PT  DVT ppx  PT eval; Fall precautions  Patient may take shower with staff assistance; d/w Nursing staff    Discussed with covering ACP Jennifer and JANUSZ A/P:  Acute metabolic/ infectious encephalopathy due to suspected UTI,   H/o recurrent Bacteremia with Sepsis 2/2 obstructive uropathy and complicated UTI s/p stent placement on 8/17/ 2021  Hypokalemia likely poor oral intake since hospital stay  Type 2 DM  HTN controlled  Hyperlipidemia  Hypothyroid    Plan:  Patient did not had Blood Cx done on admission; Send two sets Cx this morning  Patient was reoriented to her surroundings and hospitalization again; calmer daughter request if she cna get a Psych consult or medication to keep her calm. Please avoid sedatives and Benzos which can incite a vicious cycle and complicate the hospital course  As d/w Daughter and requested; will place on low dose Zyprexa 2.5 mg around 7.30 PM so patient can go to sleep  Continue IV fluids x 24 hrs more  Urine Cx negative; Follow up Blood Cx given prior similar presentation and noted to be Bacteremic especially recent stent removal  Continue Gabapentin but will consider taper down if remain agitated  Insulin sliding scale  Continue ASA/ Statin/ Synthroid/ Losartan  Holding oral hypoglycemics for now  Continue Flomax  PT eval appreciated; d/w Therapist Fern; Home PT; \Fall precautions  DVT ppx    Discussed with covering ACP Jennifer and RN  Discussed with Daughter at length at bedside reviewed findings; d/w her we currently have Tele Psych only and it may not be beneficial to evaluate her and will likely benefit from face to face eval and recommended outpatient Psych eval; Dtr verbalized understanding A/P:  Acute metabolic/ infectious encephalopathy due to suspected UTI,   H/o recurrent Bacteremia with Sepsis 2/2 obstructive uropathy and complicated UTI s/p stent placement on 8/17/ 2021  Gait instability due to physical deconditioning  Hypokalemia likely poor oral intake since hospital stay  Type 2 DM  HTN controlled  Hyperlipidemia  Hypothyroid    Plan:  Patient did not had Blood Cx done on admission; Send two sets Cx this morning  Patient was reoriented to her surroundings and hospitalization again; calmer daughter request if she cna get a Psych consult or medication to keep her calm. Please avoid sedatives and Benzos which can incite a vicious cycle and complicate the hospital course  As d/w Daughter and requested; will place on low dose Zyprexa 2.5 mg around 7.30 PM so patient can go to sleep  Continue IV fluids x 24 hrs more  Urine Cx negative; Follow up Blood Cx given prior similar presentation and noted to be Bacteremic especially recent stent removal  Continue Gabapentin but will consider taper down if remain agitated  Insulin sliding scale  Continue ASA/ Statin/ Synthroid/ Losartan  Holding oral hypoglycemics for now  Continue Flomax  PT eval appreciated; d/w Therapist Fern; Home PT; \Fall precautions  DVT ppx    Discussed with covering ACP Jennifer and RN  Discussed with Daughter at length at bedside reviewed findings; d/w her we currently have Tele Psych only and it may not be beneficial to evaluate her and will likely benefit from face to face eval and recommended outpatient Psych eval; Dtr verbalized understanding

## 2022-11-06 NOTE — DISCHARGE NOTE PROVIDER - ATTENDING DISCHARGE PHYSICAL EXAMINATION:
Patient seen and examined 11/7/22 AM and at length d/w Daughter in the evening at discharge    PHYSICAL EXAM:  GENERAL: NAD, well-groomed, well-developed  NERVOUS SYSTEM: Alert, awake and oriented x 2;   CHEST/LUNG: Clear to auscultation bilaterally  HEART: Regular rate and rhythm; No murmurs, rubs, or gallops  ABDOMEN: Soft, very mild supra pubic tenderness, Nondistended; Bowel sounds present  EXTREMITIES:  2+ Peripheral Pulses, No clubbing, cyanosis, or edema  SKIN: No rashes or lesions    Plan:  D/C Home   See discharged instructions reviewed and updated by myself for details

## 2022-11-06 NOTE — PROGRESS NOTE ADULT - SUBJECTIVE AND OBJECTIVE BOX
Patient seen and examined this morning  later met with Daughter at bedside;     77y old  Female who presents with a chief complaint of altered mental status    Patient with multiple p[rior admission over the past 2 years with bacteremia first in 2021 ([facility]) and Aug 2022 ([facility]) with Hydronephrosis needing stent which was recently removed outpt 3 weeks ago. Patient was reported to be agitated after admission needing couple of times Zyprexa     Patient much more alert and awake, intermittently getting out of bed to go to bathroom. She knows she is in hospital. was able to recall PCP name correctly and states in [place]. reported living with son and family. Reported by ACP that patient pulled out saline lock this morning    Patient participated in history taking; denies fever, chills, SOB, palpitations, chest pain, nausea, vomiting, diarrhea. did point to suprapubic area some discomfort and right thigh pain. wanted to take a shower    MEDICATIONS  (STANDING):  aspirin enteric coated 81 milliGRAM(s) Oral daily  atorvastatin 10 milliGRAM(s) Oral at bedtime  cefTRIAXone   IVPB 1000 milliGRAM(s) IV Intermittent every 24 hours  enoxaparin Injectable 40 milliGRAM(s) SubCutaneous every 24 hours  fenofibrate Tablet 145 milliGRAM(s) Oral daily  gabapentin 300 milliGRAM(s) Oral three times a day  influenza  Vaccine (HIGH DOSE) 0.7 milliLiter(s) IntraMuscular once  lactated ringers. 1000 milliLiter(s) (70 mL/Hr) IV Continuous <Continuous>  levothyroxine 100 MICROGram(s) Oral daily  losartan 50 milliGRAM(s) Oral daily  OLANZapine 2.5 milliGRAM(s) Oral <User Schedule>  sodium chloride 0.9% lock flush 3 milliLiter(s) IV Push every 8 hours  tamsulosin 0.4 milliGRAM(s) Oral at bedtime    MEDICATIONS  (PRN):  acetaminophen     Tablet .. 650 milliGRAM(s) Oral every 6 hours PRN Temp greater or equal to 38C (100.4F), Mild Pain (1 - 3)  ALBUTerol    90 MICROgram(s) HFA Inhaler 1 Puff(s) Inhalation every 6 hours PRN Bronchospasm      Vital Signs Last 24 Hrs  T(C): 36.3 (2022 12:45), Max: 36.9 (2022 21:01)  T(F): 97.3 (2022 12:45), Max: 98.4 (2022 21:01)  HR: 82 (2022 15:19) (82 - 93)  BP: 158/72 (2022 15:19) (108/63 - 158/72)  RR: 18 (2022 12:45) (18 - 18)  SpO2: 98% (2022 15:19) (95% - 99%) room air        PHYSICAL EXAM:  GENERAL: NAD, well-groomed, well-developed  NERVOUS SYSTEM: Alert, awake and oriented x 2; full   CHEST/LUNG: Clear to auscultation bilaterally  HEART: Regular rate and rhythm; No murmurs, rubs, or gallops  ABDOMEN: Soft, very mild supra pubic tenderness, Nondistended; Bowel sounds present  EXTREMITIES:  2+ Peripheral Pulses, No clubbing, cyanosis, or edema  SKIN: No rashes or lesions    LABS:                                   12.2   8.86  )-----------( 298      ( 2022 08:35 )             39.3   11-06    142  |  109<H>  |  15  ----------------------------<  138<H>  3.6   |  22  |  0.99    Ca    9.5      2022 08:35      Thyroid Stimulating Hormone, Serum (22 @ 00:40) Thyroid Stimulating Hormone, Serum: 1.32 uU/mL   Color: Yellow / Appearance: Clear / S.010 / pH: x  Gluc: x / Ketone: Negative  / Bili: Negative / Urobili: Negative   Blood: x / Protein: Negative / Nitrite: Negative   Leuk Esterase: Negative / RBC: 0-2 /HPF / WBC 0-2 /HPF   Sq Epi: x / Non Sq Epi: Few /HPF / Bacteria: Moderate /HPF    Culture - Urine (22 @ 00:40)   Specimen Source: Clean Catch Clean Catch (Midstream)   Culture Results: <10,000 CFU/mL Normal Urogenital Dulce    Patient seen and examined this morning with Daughter at bedside; Patient able to communicate with me in English. Daughter   77y old  Female who presents with a chief complaint of altered mental status    Patient with multiple prior admission over the past 2 years with bacteremia first in 2021 ([facility]) and Aug 2022 ([facility]) with Hydronephrosis needing stent which was recently removed outpt 3 weeks ago. Patient was reported to be agitated after admission needing couple of times Zyprexa     Patient doing well, much more alert and awake, wishes to go home;   Patient participated in history taking; denies fever, chills, SOB, palpitations, chest pain, nausea, vomiting, diarrhea. did point to suprapubic area some discomfort and right thigh pain. wanted to take a shower    MEDICATIONS  (STANDING):  aspirin enteric coated 81 milliGRAM(s) Oral daily  atorvastatin 10 milliGRAM(s) Oral at bedtime  cefTRIAXone   IVPB 1000 milliGRAM(s) IV Intermittent every 24 hours  enoxaparin Injectable 40 milliGRAM(s) SubCutaneous every 24 hours  fenofibrate Tablet 145 milliGRAM(s) Oral daily  gabapentin 300 milliGRAM(s) Oral three times a day  influenza  Vaccine (HIGH DOSE) 0.7 milliLiter(s) IntraMuscular once  lactated ringers. 1000 milliLiter(s) (70 mL/Hr) IV Continuous <Continuous>  levothyroxine 100 MICROGram(s) Oral daily  losartan 50 milliGRAM(s) Oral daily  OLANZapine 2.5 milliGRAM(s) Oral <User Schedule>  sodium chloride 0.9% lock flush 3 milliLiter(s) IV Push every 8 hours  tamsulosin 0.4 milliGRAM(s) Oral at bedtime    MEDICATIONS  (PRN):  acetaminophen     Tablet .. 650 milliGRAM(s) Oral every 6 hours PRN Temp greater or equal to 38C (100.4F), Mild Pain (1 - 3)  ALBUTerol    90 MICROgram(s) HFA Inhaler 1 Puff(s) Inhalation every 6 hours PRN Bronchospasm      Vital Signs Last 24 Hrs  T(C): 36.3 (2022 12:45), Max: 36.9 (2022 21:01)  T(F): 97.3 (2022 12:45), Max: 98.4 (2022 21:01)  HR: 82 (2022 15:19) (82 - 93)  BP: 158/72 (2022 15:19) (108/63 - 158/72)  RR: 18 (2022 12:45) (18 - 18)  SpO2: 98% (2022 15:19) (95% - 99%) room air        PHYSICAL EXAM:  GENERAL: NAD, well-groomed, well-developed  NERVOUS SYSTEM: Alert, awake and oriented x 2; full   CHEST/LUNG: Clear to auscultation bilaterally  HEART: Regular rate and rhythm; No murmurs, rubs, or gallops  ABDOMEN: Soft, very mild supra pubic tenderness, Nondistended; Bowel sounds present  EXTREMITIES:  2+ Peripheral Pulses, No clubbing, cyanosis, or edema  SKIN: No rashes or lesions    LABS:                                   12.2   8.86  )-----------( 298      ( 2022 08:35 )             39.3   11-06    142  |  109<H>  |  15  ----------------------------<  138<H>  3.6   |  22  |  0.99    Ca    9.5      2022 08:35      Thyroid Stimulating Hormone, Serum (22 @ 00:40) Thyroid Stimulating Hormone, Serum: 1.32 uU/mL   Color: Yellow / Appearance: Clear / S.010 / pH: x  Gluc: x / Ketone: Negative  / Bili: Negative / Urobili: Negative   Blood: x / Protein: Negative / Nitrite: Negative   Leuk Esterase: Negative / RBC: 0-2 /HPF / WBC 0-2 /HPF   Sq Epi: x / Non Sq Epi: Few /HPF / Bacteria: Moderate /HPF    Culture - Urine (22 @ 00:40)   Specimen Source: Clean Catch Clean Catch (Midstream)   Culture Results: <10,000 CFU/mL Normal Urogenital Dulce

## 2022-11-06 NOTE — DISCHARGE NOTE PROVIDER - NSDCCPCAREPLAN_GEN_ALL_CORE_FT
PRINCIPAL DISCHARGE DIAGNOSIS  Diagnosis: Metabolic encephalopathy  Assessment and Plan of Treatment: You persented to the hospital with confusion and you were admitted for suspected UTI. You were treated with IV antibiotics. Your urine cx and Blood cx were negative. You will go home with *_*_*_*_*_*. You mental status has improved and the confusion you experienced was likely due to the infection.   Please follow up with your PCP within 1 week of discharge.        SECONDARY DISCHARGE DIAGNOSES  Diagnosis: Acute UTI  Assessment and Plan of Treatment: HOME CARE INSTRUCTIONS  If you were prescribed antibiotics, take them exactly as your caregiver instructs you. Finish the medication even if you feel better after you have only taken some of the medication.  Drink enough water and fluids to keep your urine clear or pale yellow.  Avoid caffeine, tea, and carbonated beverages. They tend to irritate your bladder.  Empty your bladder often. Avoid holding urine for long periods of time.  After a bowel movement, women should cleanse from front to back. Use each tissue only once.  SEEK MEDICAL CARE IF:  You have back pain.  You develop a fever.  Your symptoms do not begin to resolve within 3 days.  SEEK IMMEDIATE MEDICAL CARE IF:  You have severe back pain or lower abdominal pain.  You develop chills.    Diagnosis: Hypothyroidism  Assessment and Plan of Treatment: Your thyroid levels are stable and you should continue to take your home medications as prescribed. Please follow up with your PCP for continued management.    Diagnosis: HTN (hypertension)  Assessment and Plan of Treatment: Your blood pressue has been controlled while you have been in the hospital and you should continue to take your medications as prescribed. You should follow up with your PCP within 1 week of discharge for continued management.    Diagnosis: HLD (hyperlipidemia)  Assessment and Plan of Treatment:      PRINCIPAL DISCHARGE DIAGNOSIS  Diagnosis: Metabolic encephalopathy  Assessment and Plan of Treatment: You persented to the hospital with confusion and you were admitted for suspected UTI. You were treated with IV antibiotics. Your urine cx and Blood cx were negative. Your mental status has improved and the confusion you experienced was likely due to the infection.   Please follow up with your PCP within 1 week of discharge.        SECONDARY DISCHARGE DIAGNOSES  Diagnosis: Acute UTI  Assessment and Plan of Treatment: HOME CARE INSTRUCTIONS  If you were prescribed antibiotics, take them exactly as your caregiver instructs you. Finish the medication even if you feel better after you have only taken some of the medication.  Drink enough water and fluids to keep your urine clear or pale yellow.  Avoid caffeine, tea, and carbonated beverages. They tend to irritate your bladder.  Empty your bladder often. Avoid holding urine for long periods of time.  After a bowel movement, women should cleanse from front to back. Use each tissue only once.  SEEK MEDICAL CARE IF:  You have back pain.  You develop a fever.  Your symptoms do not begin to resolve within 3 days.  SEEK IMMEDIATE MEDICAL CARE IF:  You have severe back pain or lower abdominal pain.  You develop chills.    Diagnosis: Hypothyroidism  Assessment and Plan of Treatment: Your thyroid levels are stable and you should continue to take your home medications as prescribed. Please follow up with your PCP for continued management.    Diagnosis: HTN (hypertension)  Assessment and Plan of Treatment: Your blood pressue has been controlled while you have been in the hospital and you should continue to take your medications as prescribed. You should follow up with your PCP within 1 week of discharge for continued management.    Diagnosis: HLD (hyperlipidemia)  Assessment and Plan of Treatment:      PRINCIPAL DISCHARGE DIAGNOSIS  Diagnosis: Metabolic encephalopathy  Assessment and Plan of Treatment: You persented to the hospital with confusion and you were admitted for suspected UTI given your prior presentation with similar compalints and those times were noted to have infection in blood stream (Bacteremia). You recently also had stent removed with outpatient Urologist 3 weeks earlier. You was place on intravenous fluids as you appeared dehydrated.  You were treated with IV antibiotics Ceftriaxone.   Urinanalysis was negative for infectious etiology. Urine Culture returned negative.    Blood cx were only send 0n 11/6/22; It remained negative at 24 hrs on day of discharge. Your mental status has improved and the confusion you experienced was likely due to a combination of dehydration and possible infection.   Although there was no clear source of infection and given recent stent removal and prior hisotry of kidney stones and obstructive uropathy and blood stream infection, we will continue with oral antibiotics x 3 more days to complete a week of antibiotics.  Please follow up with your PCP within 1 week of discharge.        SECONDARY DISCHARGE DIAGNOSES  Diagnosis: Acute UTI  Assessment and Plan of Treatment: Complete Antibiotic course for suspected UTI. Your Urine Culture was negative.   HOME CARE INSTRUCTIONS  If you were prescribed antibiotics, take them exactly as your caregiver instructs you. Finish the medication even if you feel better after you have only taken some of the medication.  Drink enough water and fluids to keep your urine clear or pale yellow.  Avoid caffeine, tea, and carbonated beverages. They tend to irritate your bladder.  Empty your bladder often. Avoid holding urine for long periods of time.  After a bowel movement, women should cleanse from front to back. Use each tissue only once.  SEEK MEDICAL CARE IF:  You have back pain.  You develop a fever.  Your symptoms do not begin to resolve within 3 days.  SEEK IMMEDIATE MEDICAL CARE IF:  You have severe back pain or lower abdominal pain.  You develop chills.    Diagnosis: Hypothyroidism  Assessment and Plan of Treatment: Your thyroid levels are stable and you should continue to take your home medications as prescribed. Please follow up with your PCP for continued management. Adjust Synthroid as per TSH by PCP.    Diagnosis: HTN (hypertension)  Assessment and Plan of Treatment: Your blood pressue has been controlled while you have been in the hospital and you should continue to take your medications as prescribed. You should follow up with your PCP within 1 week of discharge for continued management.    Diagnosis: HLD (hyperlipidemia)  Assessment and Plan of Treatment:      PRINCIPAL DISCHARGE DIAGNOSIS  Diagnosis: Metabolic encephalopathy  Assessment and Plan of Treatment: You persented to the hospital with confusion and you were admitted for suspected UTI given your prior presentation with similar compalints and those times were noted to have infection in blood stream (Bacteremia). You recently also had stent removed with outpatient Urologist 3 weeks earlier. You was place on intravenous fluids as you appeared dehydrated.  You were treated with IV antibiotics Ceftriaxone.   Urinanalysis was negative for infectious etiology. Urine Culture returned negative.    Blood cx were only send 0n 11/6/22; It remained negative at 24 hrs on day of discharge. Your mental status has improved and the confusion you experienced was likely due to a combination of dehydration and possible infection. There may be acomponent of low sugars contributing to your confusion.   Although there was no clear source of infection and given recent stent removal and prior hisotry of kidney stones and obstructive uropathy and blood stream infection, we will continue with oral antibiotics x 3 more days to complete a week of antibiotics.  Please follow up with your PCP within 1 week of discharge.        SECONDARY DISCHARGE DIAGNOSES  Diagnosis: Acute UTI  Assessment and Plan of Treatment: Complete Antibiotic course for suspected UTI. Your Urine Culture was negative.   HOME CARE INSTRUCTIONS  If you were prescribed antibiotics, take them exactly as your caregiver instructs you. Finish the medication even if you feel better after you have only taken some of the medication.  Drink enough water and fluids to keep your urine clear or pale yellow.  Avoid caffeine, tea, and carbonated beverages. They tend to irritate your bladder.  Empty your bladder often. Avoid holding urine for long periods of time.  After a bowel movement, women should cleanse from front to back. Use each tissue only once.  SEEK MEDICAL CARE IF:  You have back pain.  You develop a fever.  Your symptoms do not begin to resolve within 3 days.  SEEK IMMEDIATE MEDICAL CARE IF:  You have severe back pain or lower abdominal pain.  You develop chills.    Diagnosis: Type 2 diabetes mellitus  Assessment and Plan of Treatment: You have a hisotry of Type 2 Diabetes. last samuel your A1c was to be 6.1 suggesting too tight control of sugars and increaed risk of hypoglycemia especially Glimepiride you take at home.   YOU ARE ADVISED TO CONTINUE WITH METFORMIN TWICE DAILY AS BEFORE AND MONITOR SUGARS CLOSLEY; TARGET 100  FASTING   AFTER MEALS AND A1C TARGET OF 7   YOUR DAUGHTER ADVISED TO HOLD GLIMEPIRIDE FOR NOW.   IF SUGARS PERSISTENTLY MORE THAN 200, MAY RESUME GLIMEPIRIDE HALF DOSE 1MG DAILY. PLEASE FOLLOW UP WITH YOUR PCP.  PLEASE CHECK SUGARS CLOSLEY FOR NEXT FEW DAYS    Diagnosis: Mild vascular dementia with agitation  Assessment and Plan of Treatment: You have mild confusion and behavioral disturbance could be evidence of early Dementia. TSH levels adequate. B12 and folate levels were normal which are reversible causes of Dementia.   You was given Zyprexa 2.5 mg evening to be given around 7 to 8 pm so that it should be working by the time ready for sleep.   PLEASE FOLOW UP WITH NEUROLOGIST DR. DA SILVA AS ADVISED    Diagnosis: Hypothyroidism  Assessment and Plan of Treatment: Your thyroid levels are stable and you should continue to take your home medications as prescribed. Please follow up with your PCP for continued management. Adjust Synthroid as per TSH by PCP.  Your TSH level was noted to be 1.32 (normal limits)    Diagnosis: HTN (hypertension)  Assessment and Plan of Treatment: Your blood pressue has been controlled while you have been in the hospital and you should continue to take your medications as prescribed. You should follow up with your PCP within 1 week of discharge for continued management.    Diagnosis: HLD (hyperlipidemia)  Assessment and Plan of Treatment: Continue with Statin at bedtime.     PRINCIPAL DISCHARGE DIAGNOSIS  Diagnosis: Metabolic encephalopathy  Assessment and Plan of Treatment: You persented to the hospital with confusion and you were admitted for suspected UTI given your prior presentation with similar compalints and those times were noted to have infection in blood stream (Bacteremia). You recently also had stent removed with outpatient Urologist 3 weeks earlier. You was place on intravenous fluids as you appeared dehydrated.  You were treated with IV antibiotics Ceftriaxone.   Urinanalysis was negative for infectious etiology. Urine Culture returned negative.    Blood cx were only send 0n 11/6/22; It remained negative at 24 hrs on day of discharge. Your mental status has improved and the confusion you experienced was likely due to a combination of dehydration and possible infection. There may be acomponent of low sugars contributing to your confusion.   Although there was no clear source of infection and given recent stent removal and prior hisotry of kidney stones and obstructive uropathy and blood stream infection, we will continue with oral antibiotics x 3 more days to complete a week of antibiotics.  Please follow up with your PCP within 1 week of discharge.        SECONDARY DISCHARGE DIAGNOSES  Diagnosis: Acute UTI  Assessment and Plan of Treatment: Complete Antibiotic course for suspected UTI. Your Urine Culture was negative.   HOME CARE INSTRUCTIONS  If you were prescribed antibiotics, take them exactly as your caregiver instructs you. Finish the medication even if you feel better after you have only taken some of the medication.  Drink enough water and fluids to keep your urine clear or pale yellow.  Avoid caffeine, tea, and carbonated beverages. They tend to irritate your bladder.  Empty your bladder often. Avoid holding urine for long periods of time.  After a bowel movement, women should cleanse from front to back. Use each tissue only once.  SEEK MEDICAL CARE IF:  You have back pain.  You develop a fever.  Your symptoms do not begin to resolve within 3 days.  SEEK IMMEDIATE MEDICAL CARE IF:  You have severe back pain or lower abdominal pain.  You develop chills.    Diagnosis: Type 2 diabetes mellitus  Assessment and Plan of Treatment: You have a hisotry of Type 2 Diabetes. last samuel your A1c was to be 6.1 suggesting too tight control of sugars and increaed risk of hypoglycemia especially Glimepiride you take at home.   YOU ARE ADVISED TO CONTINUE WITH METFORMIN TWICE DAILY AS BEFORE AND MONITOR SUGARS CLOSLEY; TARGET 100  FASTING   AFTER MEALS AND A1C TARGET OF 7   YOUR DAUGHTER ADVISED TO HOLD GLIMEPIRIDE FOR NOW.   IF SUGARS PERSISTENTLY MORE THAN 200, MAY RESUME GLIMEPIRIDE HALF DOSE 1MG DAILY. PLEASE FOLLOW UP WITH YOUR PCP.  PLEASE CHECK SUGARS CLOSLEY FOR NEXT FEW DAYS    Diagnosis: Mild vascular dementia with agitation  Assessment and Plan of Treatment: You have mild confusion and behavioral disturbance could be evidence of early Dementia. TSH levels adequate. B12 and folate levels were normal which are reversible causes of Dementia.   You was given Zyprexa 2.5 mg evening to be given around 7 to 8 pm so that it should be working by the time ready for sleep.   PLEASE FOLOW UP WITH NEUROLOGIST DR. DA SILVA AS ADVISED  We send a Homocysteine and Methylmalonic acid levels (confirmatory markers)  to rule out folate or B12 deficiency. If abnormal will replace.    Diagnosis: Hypothyroidism  Assessment and Plan of Treatment: Your thyroid levels are stable and you should continue to take your home medications as prescribed. Please follow up with your PCP for continued management. Adjust Synthroid as per TSH by PCP.  Your TSH level was noted to be 1.32 (normal limits)    Diagnosis: HTN (hypertension)  Assessment and Plan of Treatment: Your blood pressue has been controlled while you have been in the hospital and you should continue to take your medications as prescribed. You should follow up with your PCP within 1 week of discharge for continued management.    Diagnosis: HLD (hyperlipidemia)  Assessment and Plan of Treatment: Continue with Statin at bedtime.    Diagnosis: History of kidney stones  Assessment and Plan of Treatment: You have signigficant history of kidney stones. You had recent stent removal which was placed for obstructed stones. Follow up wioth your Urologist as outpatient. Encourage oral fluids to minimize risk of recurrent UTI and stones. Continue Flomax as before to minimize ureteric obstrution

## 2022-11-06 NOTE — PROGRESS NOTE ADULT - ASSESSMENT
A/P:  Acute metabolic/ infectious encephalopathy due to suspected UTI,   H/o recurrent Bacteremia with Sepsis 2/2 obstructive uropathy and complicated UTI s/p stent placement on 8/17/ 2021  Hypokalemia likely poor oral intake since hospital stay  Type 2 DM  HTN controlled  Hyperlipidemia  Hypothyroid    Plan:  Patient was reoriented to her surroundings and hospitalization and was note dot be calm through the afternoon  Continue IV fluid  Urine Cx negative; Follow up Blood Cx given prior similar presentation and noted joesph Bacteremic especially recent stent removal  Please avoid sedatives and Benzos which can incite a vicious cycle and complicate the hospital course  Continue gabapentin but will consider taper down if remain agitated  Insulin sliding scale  Continue ASA/ Statin/ Synthroid/ Losartan  Holding oral hypoglycemics for now  Continue Flomax  DVT ppx  PT eval; Fall precautions  Patient may take shower with staff assistance; d/w Nursing staff    Discussed with covering FUNMILAYO Rodriguez and JANUSZ

## 2022-11-06 NOTE — CHART NOTE - NSCHARTNOTEFT_GEN_A_CORE
EVENT: Called to assess pt for restlessness, attempting OOB    BRIEF HPI:    OBJECTIVE:  Vital Signs Last 24 Hrs  T(C): 36.9 (06 Nov 2022 20:53), Max: 36.9 (06 Nov 2022 20:53)  T(F): 98.4 (06 Nov 2022 20:53), Max: 98.4 (06 Nov 2022 20:53)  HR: 93 (06 Nov 2022 20:53) (82 - 93)  BP: 128/71 (06 Nov 2022 20:53) (128/71 - 158/72)  BP(mean): --  RR: 18 (06 Nov 2022 20:53) (18 - 18)  SpO2: 97% (06 Nov 2022 20:53) (97% - 99%)    Parameters below as of 06 Nov 2022 20:53  Patient On (Oxygen Delivery Method): room air        FOCUSED PHYSICAL EXAM:    LABS:                        12.2   8.86  )-----------( 298      ( 06 Nov 2022 08:35 )             39.3     11-06    142  |  109<H>  |  15  ----------------------------<  138<H>  3.6   |  22  |  0.99    Ca    9.5      06 Nov 2022 08:35        EKG:   IMGAGING:    ASSESSMENT:  HPI:   76 yo F from home Khmer speaker w / pmhx L UPJ calculus s/p cysto, stent March 2021, HTN, DM, hypothyroid, was brought by her daughter because of concerns of worsening generalized weakness, decreased oral intake and abdominal discomfort as per triage note. At the time of my assessment pt was by herself. With the help of Khmer  I asked the pt about why she had come to the hospital, however, she was refusing to answer questions. Was not able to obtain collateral information from family member.     ED course  /95  HR 82  RR 16, O2SAT 97 % RA  s/p Ceftriaxone 1 gr + 1 L NS (04 Nov 2022 06:59)      PLAN:   MEDICATIONS  (STANDING):  aspirin enteric coated 81 milliGRAM(s) Oral daily  atorvastatin 10 milliGRAM(s) Oral at bedtime  cefTRIAXone   IVPB 1000 milliGRAM(s) IV Intermittent every 24 hours  enoxaparin Injectable 40 milliGRAM(s) SubCutaneous every 24 hours  fenofibrate Tablet 145 milliGRAM(s) Oral daily  gabapentin 300 milliGRAM(s) Oral three times a day  influenza  Vaccine (HIGH DOSE) 0.7 milliLiter(s) IntraMuscular once  lactated ringers. 1000 milliLiter(s) (70 mL/Hr) IV Continuous <Continuous>  levothyroxine 100 MICROGram(s) Oral daily  losartan 50 milliGRAM(s) Oral daily  OLANZapine 2.5 milliGRAM(s) Oral <User Schedule>  OLANZapine Injectable 2.5 milliGRAM(s) IntraMuscular once  sodium chloride 0.9% lock flush 3 milliLiter(s) IV Push every 8 hours  tamsulosin 0.4 milliGRAM(s) Oral at bedtime    MEDICATIONS  (PRN):  acetaminophen     Tablet .. 650 milliGRAM(s) Oral every 6 hours PRN Temp greater or equal to 38C (100.4F), Mild Pain (1 - 3)  ALBUTerol    90 MICROgram(s) HFA Inhaler 1 Puff(s) Inhalation every 6 hours PRN Bronchospasm    FOLLOW UP / RESULT: EVENT: Called to assess pt for restlessness, attempting OOB    BRIEF HPI: 76 yo F from home Greek speaker w / PMH L UPJ calculus s/p cysto, stent March 2021, HTN, DM, hypothyroid. coming in with 1 days hx of confusion and decreased PO intake. In the ED with VSS, on labs with bland UA. CTH showing no acute IC pathology, mild cerebral volume loss, moderate chronic microvascular changes. CT A/P showing cystitis with left sided ascending ureteritis and pyelonephritis. Interval resolution of obstructing proximal left ureteral stone, left hydronephrosis and perinephric stranding. New focal airspace opacity medial right lower lobe which could represent atelectasis v/s pneumonia.  Pt is being admitted for pyelonephritis. Now restless, poor safety awareness.    OBJECTIVE:  Vital Signs Last 24 Hrs  T(C): 36.9 (06 Nov 2022 20:53), Max: 36.9 (06 Nov 2022 20:53)  T(F): 98.4 (06 Nov 2022 20:53), Max: 98.4 (06 Nov 2022 20:53)  HR: 93 (06 Nov 2022 20:53) (82 - 93)  BP: 128/71 (06 Nov 2022 20:53) (128/71 - 158/72)  BP(mean): --  RR: 18 (06 Nov 2022 20:53) (18 - 18)  SpO2: 97% (06 Nov 2022 20:53) (97% - 99%)    Parameters below as of 06 Nov 2022 20:53  Patient On (Oxygen Delivery Method): room air    FOCUSED PHYSICAL EXAM:  NEURO: Awake, fidgeting, attempting OOB, conversant in English  RESP: Even, unlabored  CV: S1 S2    LABS:                        12.2   8.86  )-----------( 298      ( 06 Nov 2022 08:35 )             39.3     11-06    142  |  109<H>  |  15  ----------------------------<  138<H>  3.6   |  22  |  0.99    Ca    9.5      06 Nov 2022 08:35    PLAN:   1. Olanzapine Injectable 2.5 mary GRAM(s) Intramuscular X 1    FOLLOW UP / RESULT: effectiveness of med

## 2022-11-06 NOTE — DISCHARGE NOTE PROVIDER - NSDCMRMEDTOKEN_GEN_ALL_CORE_FT
ADMELOG SOLO 100U/ML PEN:   ALBUTEROL(V)  INH:   ASPIRIN EC 81 MG TABLET: TAKE 1 TABLET (81 MG) BY ORAL ROUTE ONCE DAILY WITH FOOD.  ATORVASTATIN 10 MG TABLET: TAKE 1 TABLET BY MOUTH EVERY DAY  BUDES/FORMOT 160-4.5 INH:   budesonide:   FENOFIBRATE 145 MG TABLET: TAKE 1 TABLET BY MOUTH EVERY DAY  GABAPENTIN 300 MG CAPSULE: TAKE 1 CAPSULE BY MOUTH THREE TIMES A DAY  GLIMEPIRIDE 2 MG TABLET: TAKE 1 TABLET BY MOUTH EVERY DAY  IPRAT-ALBUT 0.5-3(2.5) MG/3 ML: 3 ML ORALLY VIA NEBULIZER EVERY 12 HOURS  LEVOTHYROXIN 100MCG TAB:   losartan 50 mg oral tablet: 1 tab(s) orally once a day  LOSARTAN POT 100MG TAB:   METFORMIN 500MG TAB:   MONTELUKAST 10MG TAB:   TAMSULOSIN 0.4MG CAP:    ALBUTEROL(V)  INH: 1 puff(s) inhaled every 6 hours, As Needed Bronchospasm   ASPIRIN EC 81 MG TABLET: TAKE 1 TABLET (81 MG) BY ORAL ROUTE ONCE DAILY WITH FOOD.  ATORVASTATIN 10 MG TABLET: TAKE 1 TABLET BY MOUTH EVERY DAY  cefuroxime 250 mg oral tablet: 1 tab(s) orally every 12 hours   fenofibrate 145 mg oral tablet: 1 tab(s) orally once a day  GABAPENTIN 300 MG CAPSULE: 1 cap(s) orally 3 times a day  IPRAT-ALBUT 0.5-3(2.5) MG/3 ML: 3 ML ORALLY VIA NEBULIZER EVERY 12 HOURS  LEVOTHYROXIN 100MCG TAB: 1 tab(s) orally once a day  losartan 50 mg oral tablet: 1 tab(s) orally once a day  METFORMIN 500MG TAB: 1 tab(s) orally 2 times a day  MONTELUKAST 10MG TAB:   TAMSULOSIN 0.4MG CAP: 1 cap(s) orally once a day (at bedtime)  ZyPREXA 2.5 mg oral tablet: 1 tab(s) orally once a day at 7P   ALBUTEROL(V)  INH: 1 puff(s) inhaled every 6 hours, As Needed Bronchospasm   ASPIRIN EC 81 MG TABLET: TAKE 1 TABLET (81 MG) BY ORAL ROUTE ONCE DAILY WITH FOOD.  ATORVASTATIN 10 MG TABLET: TAKE 1 TABLET BY MOUTH EVERY DAY  cefuroxime 250 mg oral tablet: 1 tab(s) orally every 12 hours   fenofibrate 145 mg oral tablet: 1 tab(s) orally once a day  GABAPENTIN 300 MG CAPSULE: 1 cap(s) orally 3 times a day  IPRAT-ALBUT 0.5-3(2.5) MG/3 ML: 3 ML ORALLY VIA NEBULIZER EVERY 12 HOURS  LEVOTHYROXIN 100MCG TAB: 1 tab(s) orally once a day  losartan 50 mg oral tablet: 1 tab(s) orally once a day  METFORMIN 500MG TAB: 1 tab(s) orally 2 times a day  MONTELUKAST 10MG TAB: tab(s) orally once a day (at bedtime)  TAMSULOSIN 0.4MG CAP: 1 cap(s) orally once a day (at bedtime)  ZyPREXA 2.5 mg oral tablet: 1 tab(s) orally once a day at 7P

## 2022-11-07 ENCOUNTER — TRANSCRIPTION ENCOUNTER (OUTPATIENT)
Age: 77
End: 2022-11-07

## 2022-11-07 VITALS
OXYGEN SATURATION: 95 % | HEART RATE: 85 BPM | SYSTOLIC BLOOD PRESSURE: 148 MMHG | TEMPERATURE: 98 F | RESPIRATION RATE: 18 BRPM | DIASTOLIC BLOOD PRESSURE: 61 MMHG

## 2022-11-07 DIAGNOSIS — F01.A11 VASCULAR DEMENTIA, MILD, WITH AGITATION: ICD-10-CM

## 2022-11-07 LAB — LEGIONELLA AG UR QL: NEGATIVE — SIGNIFICANT CHANGE UP

## 2022-11-07 PROCEDURE — 70450 CT HEAD/BRAIN W/O DYE: CPT | Mod: MA

## 2022-11-07 PROCEDURE — 87449 NOS EACH ORGANISM AG IA: CPT

## 2022-11-07 PROCEDURE — 84443 ASSAY THYROID STIM HORMONE: CPT

## 2022-11-07 PROCEDURE — 87641 MR-STAPH DNA AMP PROBE: CPT

## 2022-11-07 PROCEDURE — 99239 HOSP IP/OBS DSCHRG MGMT >30: CPT

## 2022-11-07 PROCEDURE — 80053 COMPREHEN METABOLIC PANEL: CPT

## 2022-11-07 PROCEDURE — 74177 CT ABD & PELVIS W/CONTRAST: CPT | Mod: MA

## 2022-11-07 PROCEDURE — 87040 BLOOD CULTURE FOR BACTERIA: CPT

## 2022-11-07 PROCEDURE — 85027 COMPLETE CBC AUTOMATED: CPT

## 2022-11-07 PROCEDURE — 83921 ORGANIC ACID SINGLE QUANT: CPT

## 2022-11-07 PROCEDURE — 87637 SARSCOV2&INF A&B&RSV AMP PRB: CPT

## 2022-11-07 PROCEDURE — 96374 THER/PROPH/DIAG INJ IV PUSH: CPT

## 2022-11-07 PROCEDURE — 96375 TX/PRO/DX INJ NEW DRUG ADDON: CPT

## 2022-11-07 PROCEDURE — 99285 EMERGENCY DEPT VISIT HI MDM: CPT | Mod: 25

## 2022-11-07 PROCEDURE — 81001 URINALYSIS AUTO W/SCOPE: CPT

## 2022-11-07 PROCEDURE — 36415 COLL VENOUS BLD VENIPUNCTURE: CPT

## 2022-11-07 PROCEDURE — 85730 THROMBOPLASTIN TIME PARTIAL: CPT

## 2022-11-07 PROCEDURE — 85610 PROTHROMBIN TIME: CPT

## 2022-11-07 PROCEDURE — 93005 ELECTROCARDIOGRAM TRACING: CPT

## 2022-11-07 PROCEDURE — 82746 ASSAY OF FOLIC ACID SERUM: CPT

## 2022-11-07 PROCEDURE — 83090 ASSAY OF HOMOCYSTEINE: CPT

## 2022-11-07 PROCEDURE — 85025 COMPLETE CBC W/AUTO DIFF WBC: CPT

## 2022-11-07 PROCEDURE — 84484 ASSAY OF TROPONIN QUANT: CPT

## 2022-11-07 PROCEDURE — 84436 ASSAY OF TOTAL THYROXINE: CPT

## 2022-11-07 PROCEDURE — 82607 VITAMIN B-12: CPT

## 2022-11-07 PROCEDURE — 87640 STAPH A DNA AMP PROBE: CPT

## 2022-11-07 PROCEDURE — 82306 VITAMIN D 25 HYDROXY: CPT

## 2022-11-07 PROCEDURE — 80048 BASIC METABOLIC PNL TOTAL CA: CPT

## 2022-11-07 PROCEDURE — 87086 URINE CULTURE/COLONY COUNT: CPT

## 2022-11-07 RX ORDER — FENOFIBRATE,MICRONIZED 130 MG
1 CAPSULE ORAL
Qty: 0 | Refills: 0 | DISCHARGE

## 2022-11-07 RX ORDER — LEVOTHYROXINE SODIUM 125 MCG
0 TABLET ORAL
Qty: 0 | Refills: 0 | DISCHARGE

## 2022-11-07 RX ORDER — LOSARTAN POTASSIUM 100 MG/1
0 TABLET, FILM COATED ORAL
Qty: 0 | Refills: 0 | DISCHARGE

## 2022-11-07 RX ORDER — MONTELUKAST 4 MG/1
0 TABLET, CHEWABLE ORAL
Qty: 0 | Refills: 0 | DISCHARGE

## 2022-11-07 RX ORDER — INSULIN LISPRO 100/ML
0 VIAL (ML) SUBCUTANEOUS
Qty: 0 | Refills: 0 | DISCHARGE

## 2022-11-07 RX ORDER — GABAPENTIN 400 MG/1
1 CAPSULE ORAL
Qty: 0 | Refills: 2 | DISCHARGE

## 2022-11-07 RX ORDER — ALBUTEROL 90 UG/1
1 AEROSOL, METERED ORAL
Qty: 0 | Refills: 0 | DISCHARGE

## 2022-11-07 RX ORDER — TAMSULOSIN HYDROCHLORIDE 0.4 MG/1
0 CAPSULE ORAL
Qty: 0 | Refills: 0 | DISCHARGE

## 2022-11-07 RX ORDER — METFORMIN HYDROCHLORIDE 850 MG/1
0 TABLET ORAL
Qty: 0 | Refills: 0 | DISCHARGE

## 2022-11-07 RX ORDER — FENOFIBRATE,MICRONIZED 130 MG
0 CAPSULE ORAL
Qty: 0 | Refills: 0 | DISCHARGE

## 2022-11-07 RX ORDER — LEVOTHYROXINE SODIUM 125 MCG
1 TABLET ORAL
Qty: 0 | Refills: 0 | DISCHARGE

## 2022-11-07 RX ORDER — TAMSULOSIN HYDROCHLORIDE 0.4 MG/1
1 CAPSULE ORAL
Qty: 0 | Refills: 0 | DISCHARGE

## 2022-11-07 RX ORDER — OLANZAPINE 15 MG/1
1 TABLET, FILM COATED ORAL
Qty: 30 | Refills: 0
Start: 2022-11-07 | End: 2022-12-06

## 2022-11-07 RX ORDER — METFORMIN HYDROCHLORIDE 850 MG/1
1 TABLET ORAL
Qty: 0 | Refills: 0 | DISCHARGE

## 2022-11-07 RX ORDER — BUDESONIDE AND FORMOTEROL FUMARATE DIHYDRATE 160; 4.5 UG/1; UG/1
0 AEROSOL RESPIRATORY (INHALATION)
Qty: 0 | Refills: 0 | DISCHARGE

## 2022-11-07 RX ORDER — CEFUROXIME AXETIL 250 MG
1 TABLET ORAL
Qty: 6 | Refills: 0
Start: 2022-11-07 | End: 2022-11-09

## 2022-11-07 RX ORDER — BUDESONIDE, MICRONIZED 100 %
0 POWDER (GRAM) MISCELLANEOUS
Qty: 0 | Refills: 0 | DISCHARGE

## 2022-11-07 RX ORDER — GLIMEPIRIDE 1 MG
0 TABLET ORAL
Qty: 0 | Refills: 0 | DISCHARGE

## 2022-11-07 RX ORDER — ALBUTEROL 90 UG/1
0 AEROSOL, METERED ORAL
Qty: 0 | Refills: 0 | DISCHARGE

## 2022-11-07 RX ORDER — GABAPENTIN 400 MG/1
0 CAPSULE ORAL
Qty: 0 | Refills: 2 | DISCHARGE

## 2022-11-07 RX ADMIN — Medication 81 MILLIGRAM(S): at 11:47

## 2022-11-07 RX ADMIN — SODIUM CHLORIDE 70 MILLILITER(S): 9 INJECTION, SOLUTION INTRAVENOUS at 05:33

## 2022-11-07 RX ADMIN — SODIUM CHLORIDE 3 MILLILITER(S): 9 INJECTION INTRAMUSCULAR; INTRAVENOUS; SUBCUTANEOUS at 13:06

## 2022-11-07 RX ADMIN — SODIUM CHLORIDE 3 MILLILITER(S): 9 INJECTION INTRAMUSCULAR; INTRAVENOUS; SUBCUTANEOUS at 05:32

## 2022-11-07 RX ADMIN — Medication 100 MICROGRAM(S): at 05:34

## 2022-11-07 RX ADMIN — Medication 145 MILLIGRAM(S): at 11:47

## 2022-11-07 RX ADMIN — ENOXAPARIN SODIUM 40 MILLIGRAM(S): 100 INJECTION SUBCUTANEOUS at 11:48

## 2022-11-07 RX ADMIN — GABAPENTIN 300 MILLIGRAM(S): 400 CAPSULE ORAL at 13:09

## 2022-11-07 RX ADMIN — GABAPENTIN 300 MILLIGRAM(S): 400 CAPSULE ORAL at 05:34

## 2022-11-07 RX ADMIN — LOSARTAN POTASSIUM 50 MILLIGRAM(S): 100 TABLET, FILM COATED ORAL at 05:34

## 2022-11-07 NOTE — DISCHARGE NOTE NURSING/CASE MANAGEMENT/SOCIAL WORK - NSDCPEPTCAREGIVEDUMATLIST _GEN_ALL_CORE
After Visit Summary   6/9/2017    Bibi Reyes    MRN: [id number]           Visit Information        Provider Department Dept Phone    6/9/2017  5:30 AM PACU ROOM  [facility] [phone]      Your Vitals Were     BP Pulse Resp Ht SpO2       109/82 Melatonin 5 MG Oral Tab Take 1.4 tablets (7 mg total) by mouth nightly. PROGRAF 1 MG Oral Cap 6 mg 2 (two) times daily. predniSONE (DELTASONE) 2.5 MG Oral Tab Take 5 mg by mouth every morning.       Calcium Citrate-Vitamin D (CALCIUM CITRATE + D) 3 BED REST [DCZ281 CUSTOM]     CARDIAC MONITORING [ZFV414 CUSTOM]     CONTACT ISOLATION [ISO7 CUSTOM]     DISCHARGE PATIENT WHEN ANES CRITERIA MET [[id number] CUSTOM]     DISCHARGE PATIENT WHEN ANES CRITERIA MET [[id number] CUSTOM]     DISCONTINUE PIV [[id number] CUSTO Diabetes

## 2022-11-07 NOTE — CHART NOTE - NSCHARTNOTEFT_GEN_A_CORE
NP rec'vd call from dtany Migdalia # [phone].  NP provided update.  Dtr questioned if pt would be dc'd today.  NP informed dtr that NP would know after rounds and would call her back to let her know.  Dtr verbalized understanding.        After rounds:  NP called dtr to inform pt was for dc today.  However, blood cultures were still pending.  NP informed dtr that pt was still cleared for dc b/s if the blood cultures result positive, pt would be called and notified.  Dtr verbalized understanding and in agreement.  Dtr stated that she would be able to pick pt up in the afternoon, after 4P.  NP verbalized understanding and informed dtr that was fine.  Dtr will pt pt up

## 2022-11-07 NOTE — DISCHARGE NOTE NURSING/CASE MANAGEMENT/SOCIAL WORK - NURSING SECTION COMPLETE
73 yo female with h/o DM, A fib on xarelto, COPD on 2L O2 as needed, HLD BIBA for SOB x 2 days but worse this morning. EMS gave 2 nebs and decadron 10 mg without some improvement. Patient recently diagnosed with pneumonia a few weeks ago, finished course of abx. patient has been taking prednisone taper at home x 1 week. Patient without productive cough. patient admits to chest tightness. Denies fever, chills, URI sxs, abd pain, N/V, UE/LE weakness or paresthesias, peripheral edema. + smoker. Patient/Caregiver provided printed discharge information.

## 2022-11-07 NOTE — DISCHARGE NOTE NURSING/CASE MANAGEMENT/SOCIAL WORK - NSDCPEFALRISK_GEN_ALL_CORE
For information on Fall & Injury Prevention, visit: [email or web address] OR  [email or web address] OR  https://www.cdc.gov/steadi/patient.html

## 2022-11-07 NOTE — DISCHARGE NOTE NURSING/CASE MANAGEMENT/SOCIAL WORK - PATIENT PORTAL LINK FT
You can access the FollowMyHealth Patient Portal offered by [facility] by registering at the following website: [email or web address]. By joining [employer]’s FollowMyHealth portal, you will also be able to view your health information using other applications (apps) compatible with our system.

## 2022-11-11 LAB
CULTURE RESULTS: SIGNIFICANT CHANGE UP
CULTURE RESULTS: SIGNIFICANT CHANGE UP
HOMOCYSTEINE LEVEL: 15.2 UMOL/L — HIGH
METHYLMALONIC ACID LEVEL: 280 NMOL/L — SIGNIFICANT CHANGE UP (ref 87–318)
SPECIMEN SOURCE: SIGNIFICANT CHANGE UP
SPECIMEN SOURCE: SIGNIFICANT CHANGE UP

## 2023-02-13 NOTE — PATIENT PROFILE ADULT - NSASFALLNEEDSASSIST_GEN_A_NUR
Physical Therapy Visit    Visit Type: Daily Treatment Note  Visit: 4  Referring Provider: Donna Salazar PA-C  Medical Diagnosis (from order): Diagnosis Information    Diagnosis  V54.11 (ICD-9-CM) - S42.292D (ICD-10-CM) - Other closed displaced fracture of proximal end of left humerus with routine healing, subsequent encounter         SUBJECTIVE                                                                                                               Patient states the shoulder continues to cause her pain. Patient states she slept for 3 straight nights in the hospital with her  which was not the most comfortable. Did her exercises and iced while at the hospital.    Pain / Symptoms  - Pain rating (out of 10): Current: 7       OBJECTIVE                                                                                                                     Range of Motion (ROM)   (degrees unless noted; active unless noted; norms in ( ); negative=lacking to 0, positive=beyond 0)  Shoulder:    - Flexion (180):        • Left:   Passive: 87         Palpation  Left  - Upper Trapezius: hypertonic  - Infraspinatus: hypertonic  - Supraspinatus: hypertonic  - Rhomboids: hypertonic  - Deltoid: hypertonic  - Biceps: hypertonic  Right  - Upper Trapezius: hypertonic  Shoulder: Doyle/Tendon/Bone  - Anterior Shoulder: - Left: tenderness                 Treatment     Therapeutic Exercise  Surgery: 1/11/2023; Left reverse total shoulder arthroplasty. Patient is 3 weeks status post above procedure as of 2/1/2023.    Medical history: generalized anxiety disorder    Precautions: Distal humerus/elbow should be supported or towel roll to avoid shoulder extension. No shoulder AROM. No lifting of objects with operative extremity. No supporting of body weight with involved extremity.  Special instructions: Forward flexion and elevation in the scapular place in supine to  degrees as tolerated. External rotation in scapular  place to available range of motion as indicated by operative findings (usually around 20-30 degrees). NO internal rotation range of motion. Active/Active assisted ROM of cervical spine, elbow, wrist and hand. Begin carlos-scapular submaximal pain-free isometrics in the scapular place. Supine AAROM flexion with cane- start with short lever arm and then progress to long lever motion. AAROM flexion dusting progression (counter and railing with towel)    Gentle upper trapezius stretch; 2 x 30 seconds  Gentle levator scapular stretch; 2 x 30 seconds  Elbow flexion/extension x 15  Passive range of motion of the shoulder into flexion (scapular plane) with limit of 120 degrees per protocol, external rotation (limit to 30 degrees) and elbow extension  Counter dusting (flexion); 2 x10  Supine active assistive range of motion into flexion with cane with elbows bent x 10 (limited motion; therapist lightly supporting arm)      Manual Therapy   Soft tissue mobilization to the shoulder/upper arm/neck musculature in sititng    Skilled input: verbal instruction/cues, inhibition, demonstration, tactile instruction/cues, posture correction and facilitation    Writer verbally educated and received verbal consent for hand placement, positioning of patient, and techniques to be performed today from patient for hand placement and palpation for techniques, clothing adjustments for techniques and therapist position for techniques as described above and how they are pertinent to the patient's plan of care.    Home Exercise Program  Access Code: [email or web address]  URL: [email or web address]  Date: 02/01/2023  Prepared by: Olga Hernandez    Exercises  Seated Gentle Upper Trapezius Stretch - 3 x daily - 7 x weekly - 1 sets - 30 hold  Gentle Levator Scapulae Stretch - 3 x daily - 7 x weekly - 1 sets - 30 hold  Supported Elbow Flexion Extension PROM - 3 x daily - 7 x weekly - 1 sets - 10 reps  Wrist AROM Flexion Extension - 3 x daily -  7 x weekly - 1 sets - 10 reps  Towel Roll Squeeze - 3 x daily - 7 x weekly - 1 sets - 10 reps      ASSESSMENT                                                                                                            Patient with improvements noted in passive shoulder flexion range of motion; almost able to get it to 90 degrees. Less assistance required from therapist with active assistive range of motion with cane into flexion and patient able to get further. Continues to have tightness in the upper trapezius which is likely is partially due to the stress she has been experiencing this past month. Continued tightness present in biceps and shoulder musculature.  Education:   - Results of above outlined education: Demonstrates understanding and Verbalizes understanding    PLAN                                                                                                                           Suggestions for next session as indicated: Progress per plan of care       Therapy procedure time and total treatment time can be found documented on the Time Entry flowsheet     yes

## 2023-04-24 NOTE — PATIENT PROFILE ADULT - FUNCTIONAL ASSESSMENT - BASIC MOBILITY 4.
Vito Oseguera MD   Bariatric & Advanced Laparoscopic Surgery & Endoscopy  [facility] [facility] [address]  [facility]  Phone [phone]   Fax [phone]      Date of visit: 2023          Name: Cesar Raya      MRN: [id number]       : 1971       Age: 49 y.o. Sex: female        PCP: SANFORD Gregg NP     CC:    Chief Complaint   Patient presents with    Follow-up     FU Bar        HPI:    6.5 months post-op visit after a robotic assisted sleeve gastrectomy was done on 10/10/2022. She has lost 18 lbs since her last office visit. Weight History Graph    Surgeon: Gustavo Johnson   DOS: 10/10/2022   Procedure: Sleeve   Pre-op weight: 294   Ideal body weight: 159   Excess body weight: 135     Post-Surgical Weight Loss  Date: 23  Height: 5' 7\" (170.2 cm)  Weight: 229 lb (103.9 kg)  BMI: 35.86  Weight Change: -18 lbs  Total Weight Change: -65 lbs  % EBWL: 48%     Evaluation of Pre-operative Co-morbid Conditions:    Sleep Apnea - Yes, uses CPAP - Yes    GERD - Yes, Treatment medication-Omeprazole    Clinical Assessment and Physical Exam:    She is doing very well today and is feeling good. She reports that she has been adhering to protein first diet without difficulty. She denies any abdominal pain, nausea or vomiting or heartburn. She does report having problems with constipation. She reports walking, elliptical and physical therapy 3x per week for 30-60 minutes. She is recovering from a recent spinal discectomy. Protein:  60+ grams per day  Fluids:    64 ounces per day  Exercise:  walking, elliptical and physical therapy 3x per week for 30-60 minutes  No fever or chills. Incisions well healed. Denies reflux. Denies dysphagia. Regular bowel movements. Occasional constipation. Tolerating Protein first diet without difficulty.       PMH:    Past Medical History:   Diagnosis Date    Celiac disease     Chronic pain of left knee 2 = A lot of assistance

## 2023-11-01 ENCOUNTER — OUTPATIENT (OUTPATIENT)
Dept: OUTPATIENT SERVICES | Facility: HOSPITAL | Age: 78
LOS: 1 days | End: 2023-11-01
Payer: MEDICAID

## 2023-11-01 ENCOUNTER — APPOINTMENT (OUTPATIENT)
Dept: OBGYN | Facility: CLINIC | Age: 78
End: 2023-11-01
Payer: MEDICAID

## 2023-11-01 VITALS
BODY MASS INDEX: 23.95 KG/M2 | DIASTOLIC BLOOD PRESSURE: 75 MMHG | OXYGEN SATURATION: 98 % | SYSTOLIC BLOOD PRESSURE: 134 MMHG | HEIGHT: 60 IN | WEIGHT: 122 LBS | TEMPERATURE: 98.1 F | HEART RATE: 97 BPM

## 2023-11-01 DIAGNOSIS — Z98.890 OTHER SPECIFIED POSTPROCEDURAL STATES: Chronic | ICD-10-CM

## 2023-11-01 DIAGNOSIS — Z90.710 ACQUIRED ABSENCE OF BOTH CERVIX AND UTERUS: ICD-10-CM

## 2023-11-01 DIAGNOSIS — I10 ESSENTIAL (PRIMARY) HYPERTENSION: ICD-10-CM

## 2023-11-01 DIAGNOSIS — Z78.9 OTHER SPECIFIED HEALTH STATUS: ICD-10-CM

## 2023-11-01 DIAGNOSIS — Z00.00 ENCOUNTER FOR GENERAL ADULT MEDICAL EXAMINATION WITHOUT ABNORMAL FINDINGS: ICD-10-CM

## 2023-11-01 DIAGNOSIS — Z86.39 PERSONAL HISTORY OF OTHER ENDOCRINE, NUTRITIONAL AND METABOLIC DISEASE: ICD-10-CM

## 2023-11-01 DIAGNOSIS — N31.9 NEUROMUSCULAR DYSFUNCTION OF BLADDER, UNSPECIFIED: ICD-10-CM

## 2023-11-01 DIAGNOSIS — Z90.721 ACQUIRED ABSENCE OF BOTH CERVIX AND UTERUS: ICD-10-CM

## 2023-11-01 DIAGNOSIS — Z86.79 PERSONAL HISTORY OF OTHER DISEASES OF THE CIRCULATORY SYSTEM: ICD-10-CM

## 2023-11-01 DIAGNOSIS — N95.2 POSTMENOPAUSAL ATROPHIC VAGINITIS: ICD-10-CM

## 2023-11-01 DIAGNOSIS — E11.9 TYPE 2 DIABETES MELLITUS W/OUT COMPLICATIONS: ICD-10-CM

## 2023-11-01 DIAGNOSIS — Z78.0 ASYMPTOMATIC MENOPAUSAL STATE: ICD-10-CM

## 2023-11-01 DIAGNOSIS — E05.90 THYROTOXICOSIS, UNSPECIFIED W/OUT THYROTOXIC CRISIS OR STORM: ICD-10-CM

## 2023-11-01 DIAGNOSIS — Z90.49 ACQUIRED ABSENCE OF OTHER SPECIFIED PARTS OF DIGESTIVE TRACT: Chronic | ICD-10-CM

## 2023-11-01 DIAGNOSIS — Z82.49 FAMILY HISTORY OF ISCHEMIC HEART DISEASE AND OTHER DISEASES OF THE CIRCULATORY SYSTEM: ICD-10-CM

## 2023-11-01 PROCEDURE — 99204 OFFICE O/P NEW MOD 45 MIN: CPT

## 2023-11-01 PROCEDURE — 36415 COLL VENOUS BLD VENIPUNCTURE: CPT

## 2023-11-01 PROCEDURE — 87800 DETECT AGNT MULT DNA DIREC: CPT

## 2023-11-01 PROCEDURE — G0463: CPT

## 2023-11-01 RX ORDER — AMLODIPINE BESYLATE 5 MG/1
TABLET ORAL
Refills: 0 | Status: ACTIVE | COMMUNITY

## 2023-11-01 RX ORDER — EMPAGLIFLOZIN 25 MG/1
TABLET, FILM COATED ORAL
Refills: 0 | Status: ACTIVE | COMMUNITY

## 2023-11-01 RX ORDER — LOSARTAN POTASSIUM 100 MG/1
TABLET, FILM COATED ORAL
Refills: 0 | Status: ACTIVE | COMMUNITY

## 2023-11-01 RX ORDER — FENOFIBRATE 150 MG/1
CAPSULE ORAL
Refills: 0 | Status: ACTIVE | COMMUNITY

## 2023-11-01 RX ORDER — LEVOTHYROXINE SODIUM 150 UG/1
150 TABLET ORAL
Refills: 0 | Status: ACTIVE | COMMUNITY

## 2023-11-01 RX ORDER — SITAGLIPTIN AND METFORMIN HYDROCHLORIDE 50; 1000 MG/1; MG/1
TABLET, FILM COATED ORAL
Refills: 0 | Status: ACTIVE | COMMUNITY

## 2023-11-01 RX ORDER — ATORVASTATIN CALCIUM 80 MG/1
TABLET, FILM COATED ORAL
Refills: 0 | Status: ACTIVE | COMMUNITY

## 2023-11-02 DIAGNOSIS — N95.2 POSTMENOPAUSAL ATROPHIC VAGINITIS: ICD-10-CM

## 2023-11-02 DIAGNOSIS — N31.9 NEUROMUSCULAR DYSFUNCTION OF BLADDER, UNSPECIFIED: ICD-10-CM

## 2023-11-02 DIAGNOSIS — Z86.79 PERSONAL HISTORY OF OTHER DISEASES OF THE CIRCULATORY SYSTEM: ICD-10-CM

## 2023-11-02 DIAGNOSIS — Z90.710 ACQUIRED ABSENCE OF BOTH CERVIX AND UTERUS: ICD-10-CM

## 2023-11-02 DIAGNOSIS — Z78.0 ASYMPTOMATIC MENOPAUSAL STATE: ICD-10-CM

## 2023-11-02 DIAGNOSIS — Z86.39 PERSONAL HISTORY OF OTHER ENDOCRINE, NUTRITIONAL AND METABOLIC DISEASE: ICD-10-CM

## 2023-11-03 LAB
CANDIDA VAG CYTO: NOT DETECTED
G VAGINALIS+PREV SP MTYP VAG QL MICRO: NOT DETECTED
T VAGINALIS VAG QL WET PREP: NOT DETECTED

## 2024-02-19 NOTE — ED ADULT NURSE NOTE - ALCOHOL PRE SCREEN (AUDIT - C)
RX PROGRESS NOTE: Vancomycin Therapeutic Drug Monitoring      Indication for therapy: Pneumonia    ALLERGIES:   Allergen Reactions    Acetylcysteine Other (See Comments)     bronchial spasms WITH INHALATION OF MEDICATION.  Tolerates and continues to take oral.      Ciprofloxacin PRURITUS     Tolerated if premedicated with benadryl    Colistin Other (See Comments)     Tolerated repeatedly both inhaled and IV.  Previously marked as cause of bronchospasm.         Most recent height and weight information:  Weight: 46.4 kg (02/19/24 0252)       The Following are the calculated  Current Weights for Elia Julien       Adjusted Ideal    46.4 kg 59.2 kg               Labs:  Serum Creatinine and Creatinine Clearance:  Serum creatinine: 4.38 mg/dL (H) 02/19/24 0513  Estimated creatinine clearance: 10.9 mL/min (A)    Maximum Temperature (last 24 hours)       Value Max    Temp 98.2 °F (36.8 °C)          WBC (K/mcL)   Date/Time Value   02/19/2024 0513 19.1 (H)   02/19/2024 0250 17.4 (H)     Microbiology Results       None              Assessment/Plan:  Briefly, this is a 67 year old male started on vancomycin for Pneumonia, with a target serum trough concentration of 15-20 mcg/mL +/- 2 mcg/mL.  Patient received a dose of vancomycin 1000 mg at 06:46.  Maintenance dosing regimen will be ordered once HD schedule has been confirmed.    Pharmacy will monitor levels as appropriate.    Pharmacy will continue to monitor patient (renal function, microbiology data, risk factors for adverse events, appropriate duration of therapy), will order/monitor serum levels as appropriate, and will adjust dose if/when necessary.      Thank you,    Karla Trent [facility]  2/19/2024 8:16 AM       Statement Selected

## 2024-03-22 NOTE — ED ADULT NURSE NOTE - NSFALLRSKHARMRISK_ED_ALL_ED
yes Otc Regimen: Hibiclens given to try Continue Regimen: clindamycin phosphate 1 % topical solution \\nSig: Apply to the affected areas daily after bathing\\n\\nbenzoyl peroxide 10 % topical cleanser \\nSig: Apply to the affected areas daily, lather and let sit for 3-5 minutes then rinse. Detail Level: Zone Initiate Treatment: Tretinoin .025 once daily at night

## 2024-11-26 NOTE — ED PROVIDER NOTE - CPE EDP RESP NORM
Detail Level: Detailed Add 83195 Cpt? (Important Note: In 2017 The Use Of 50538 Is Being Tracked By Cms To Determine Future Global Period Reimbursement For Global Periods): yes normal... Wound Diameter In Cm(Optional): 0 Wound Crusting?: clean Sutures?: intact

## 2025-07-18 ENCOUNTER — INPATIENT (INPATIENT)
Facility: HOSPITAL | Age: 80
LOS: 5 days | Discharge: HOME | DRG: 871 | End: 2025-07-24
Attending: STUDENT IN AN ORGANIZED HEALTH CARE EDUCATION/TRAINING PROGRAM | Admitting: STUDENT IN AN ORGANIZED HEALTH CARE EDUCATION/TRAINING PROGRAM
Payer: MEDICAID

## 2025-07-18 VITALS
RESPIRATION RATE: 29 BRPM | OXYGEN SATURATION: 93 % | WEIGHT: 132.28 LBS | HEART RATE: 131 BPM | DIASTOLIC BLOOD PRESSURE: 78 MMHG | TEMPERATURE: 105 F | SYSTOLIC BLOOD PRESSURE: 178 MMHG

## 2025-07-18 DIAGNOSIS — Z98.890 OTHER SPECIFIED POSTPROCEDURAL STATES: Chronic | ICD-10-CM

## 2025-07-18 DIAGNOSIS — Z90.49 ACQUIRED ABSENCE OF OTHER SPECIFIED PARTS OF DIGESTIVE TRACT: Chronic | ICD-10-CM

## 2025-07-18 LAB
ALBUMIN SERPL ELPH-MCNC: 3.9 G/DL — SIGNIFICANT CHANGE UP (ref 3.5–5)
ALP SERPL-CCNC: 32 U/L — LOW (ref 40–120)
ALT FLD-CCNC: 23 U/L DA — SIGNIFICANT CHANGE UP (ref 10–60)
ANION GAP SERPL CALC-SCNC: 8 MMOL/L — SIGNIFICANT CHANGE UP (ref 5–17)
APTT BLD: 32.8 SEC — SIGNIFICANT CHANGE UP (ref 26.1–36.8)
AST SERPL-CCNC: 34 U/L — SIGNIFICANT CHANGE UP (ref 10–40)
BASOPHILS # BLD AUTO: 0.04 K/UL — SIGNIFICANT CHANGE UP (ref 0–0.2)
BASOPHILS NFR BLD AUTO: 0.4 % — SIGNIFICANT CHANGE UP (ref 0–2)
BILIRUB SERPL-MCNC: 0.8 MG/DL — SIGNIFICANT CHANGE UP (ref 0.2–1.2)
BUN SERPL-MCNC: 19 MG/DL — HIGH (ref 7–18)
CALCIUM SERPL-MCNC: 9.7 MG/DL — SIGNIFICANT CHANGE UP (ref 8.4–10.5)
CHLORIDE SERPL-SCNC: 104 MMOL/L — SIGNIFICANT CHANGE UP (ref 96–108)
CO2 SERPL-SCNC: 22 MMOL/L — SIGNIFICANT CHANGE UP (ref 22–31)
CREAT SERPL-MCNC: 1.13 MG/DL — SIGNIFICANT CHANGE UP (ref 0.5–1.3)
EGFR: 50 ML/MIN/1.73M2 — LOW
EGFR: 50 ML/MIN/1.73M2 — LOW
EOSINOPHIL # BLD AUTO: 0.01 K/UL — SIGNIFICANT CHANGE UP (ref 0–0.5)
EOSINOPHIL NFR BLD AUTO: 0.1 % — SIGNIFICANT CHANGE UP (ref 0–6)
FLUAV AG NPH QL: SIGNIFICANT CHANGE UP
FLUBV AG NPH QL: SIGNIFICANT CHANGE UP
GLUCOSE SERPL-MCNC: 150 MG/DL — HIGH (ref 70–99)
HCT VFR BLD CALC: 40.3 % — SIGNIFICANT CHANGE UP (ref 34.5–45)
HGB BLD-MCNC: 13.1 G/DL — SIGNIFICANT CHANGE UP (ref 11.5–15.5)
HIV 1 & 2 AB SERPL IA.RAPID: SIGNIFICANT CHANGE UP
IMM GRANULOCYTES NFR BLD AUTO: 0.6 % — SIGNIFICANT CHANGE UP (ref 0–0.9)
INR BLD: 1.04 RATIO — SIGNIFICANT CHANGE UP (ref 0.85–1.16)
LACTATE SERPL-SCNC: 2.4 MMOL/L — HIGH (ref 0.7–2)
LYMPHOCYTES # BLD AUTO: 1.33 K/UL — SIGNIFICANT CHANGE UP (ref 1–3.3)
LYMPHOCYTES # BLD AUTO: 14.2 % — SIGNIFICANT CHANGE UP (ref 13–44)
MAGNESIUM SERPL-MCNC: 1.9 MG/DL — SIGNIFICANT CHANGE UP (ref 1.6–2.6)
MCHC RBC-ENTMCNC: 28.9 PG — SIGNIFICANT CHANGE UP (ref 27–34)
MCHC RBC-ENTMCNC: 32.5 G/DL — SIGNIFICANT CHANGE UP (ref 32–36)
MCV RBC AUTO: 89 FL — SIGNIFICANT CHANGE UP (ref 80–100)
MONOCYTES # BLD AUTO: 0.2 K/UL — SIGNIFICANT CHANGE UP (ref 0–0.9)
MONOCYTES NFR BLD AUTO: 2.1 % — SIGNIFICANT CHANGE UP (ref 2–14)
NEUTROPHILS # BLD AUTO: 7.73 K/UL — HIGH (ref 1.8–7.4)
NEUTROPHILS NFR BLD AUTO: 82.6 % — HIGH (ref 43–77)
NRBC BLD AUTO-RTO: 0 /100 WBCS — SIGNIFICANT CHANGE UP (ref 0–0)
PHOSPHATE SERPL-MCNC: 3 MG/DL — SIGNIFICANT CHANGE UP (ref 2.5–4.5)
PLATELET # BLD AUTO: 192 K/UL — SIGNIFICANT CHANGE UP (ref 150–400)
POTASSIUM SERPL-MCNC: 4.7 MMOL/L — SIGNIFICANT CHANGE UP (ref 3.5–5.3)
POTASSIUM SERPL-SCNC: 4.7 MMOL/L — SIGNIFICANT CHANGE UP (ref 3.5–5.3)
PROT SERPL-MCNC: 8.5 G/DL — HIGH (ref 6–8.3)
PROTHROM AB SERPL-ACNC: 12.2 SEC — SIGNIFICANT CHANGE UP (ref 9.9–13.4)
RBC # BLD: 4.53 M/UL — SIGNIFICANT CHANGE UP (ref 3.8–5.2)
RBC # FLD: 13.2 % — SIGNIFICANT CHANGE UP (ref 10.3–14.5)
RSV RNA NPH QL NAA+NON-PROBE: SIGNIFICANT CHANGE UP
SARS-COV-2 RNA SPEC QL NAA+PROBE: SIGNIFICANT CHANGE UP
SODIUM SERPL-SCNC: 134 MMOL/L — LOW (ref 135–145)
SOURCE RESPIRATORY: SIGNIFICANT CHANGE UP
TROPONIN I, HIGH SENSITIVITY RESULT: 4.5 NG/L — SIGNIFICANT CHANGE UP
WBC # BLD: 9.37 K/UL — SIGNIFICANT CHANGE UP (ref 3.8–10.5)
WBC # FLD AUTO: 9.37 K/UL — SIGNIFICANT CHANGE UP (ref 3.8–10.5)

## 2025-07-18 PROCEDURE — 99291 CRITICAL CARE FIRST HOUR: CPT

## 2025-07-18 RX ORDER — ACETAMINOPHEN 500 MG/5ML
1000 LIQUID (ML) ORAL ONCE
Refills: 0 | Status: COMPLETED | OUTPATIENT
Start: 2025-07-18 | End: 2025-07-18

## 2025-07-18 RX ORDER — KETOROLAC TROMETHAMINE 30 MG/ML
15 INJECTION, SOLUTION INTRAMUSCULAR; INTRAVENOUS ONCE
Refills: 0 | Status: DISCONTINUED | OUTPATIENT
Start: 2025-07-18 | End: 2025-07-18

## 2025-07-18 RX ORDER — VANCOMYCIN HCL IN 5 % DEXTROSE 1.5G/250ML
1000 PLASTIC BAG, INJECTION (ML) INTRAVENOUS ONCE
Refills: 0 | Status: COMPLETED | OUTPATIENT
Start: 2025-07-18 | End: 2025-07-18

## 2025-07-18 RX ORDER — CEFEPIME 2 G/20ML
2000 INJECTION, POWDER, FOR SOLUTION INTRAVENOUS ONCE
Refills: 0 | Status: COMPLETED | OUTPATIENT
Start: 2025-07-18 | End: 2025-07-18

## 2025-07-18 RX ADMIN — Medication 400 MILLIGRAM(S): at 22:56

## 2025-07-18 RX ADMIN — CEFEPIME 100 MILLIGRAM(S): 2 INJECTION, POWDER, FOR SOLUTION INTRAVENOUS at 22:56

## 2025-07-18 RX ADMIN — KETOROLAC TROMETHAMINE 15 MILLIGRAM(S): 30 INJECTION, SOLUTION INTRAMUSCULAR; INTRAVENOUS at 22:56

## 2025-07-18 RX ADMIN — Medication 2000 MILLILITER(S): at 22:55

## 2025-07-18 RX ADMIN — Medication 1000 MILLIGRAM(S): at 23:26

## 2025-07-18 RX ADMIN — KETOROLAC TROMETHAMINE 15 MILLIGRAM(S): 30 INJECTION, SOLUTION INTRAMUSCULAR; INTRAVENOUS at 23:26

## 2025-07-19 DIAGNOSIS — E11.40 TYPE 2 DIABETES MELLITUS WITH DIABETIC NEUROPATHY, UNSPECIFIED: ICD-10-CM

## 2025-07-19 DIAGNOSIS — N12 TUBULO-INTERSTITIAL NEPHRITIS, NOT SPECIFIED AS ACUTE OR CHRONIC: ICD-10-CM

## 2025-07-19 DIAGNOSIS — E11.9 TYPE 2 DIABETES MELLITUS WITHOUT COMPLICATIONS: ICD-10-CM

## 2025-07-19 DIAGNOSIS — A41.9 SEPSIS, UNSPECIFIED ORGANISM: ICD-10-CM

## 2025-07-19 DIAGNOSIS — Z29.9 ENCOUNTER FOR PROPHYLACTIC MEASURES, UNSPECIFIED: ICD-10-CM

## 2025-07-19 DIAGNOSIS — I10 ESSENTIAL (PRIMARY) HYPERTENSION: ICD-10-CM

## 2025-07-19 DIAGNOSIS — E78.5 HYPERLIPIDEMIA, UNSPECIFIED: ICD-10-CM

## 2025-07-19 LAB
-  CTX-M RESISTANCE MARKER: SIGNIFICANT CHANGE UP
-  ESBL: SIGNIFICANT CHANGE UP
ANION GAP SERPL CALC-SCNC: 8 MMOL/L — SIGNIFICANT CHANGE UP (ref 5–17)
APPEARANCE UR: CLEAR — SIGNIFICANT CHANGE UP
BACTERIA # UR AUTO: ABNORMAL /HPF
BASOPHILS # BLD AUTO: 0.07 K/UL — SIGNIFICANT CHANGE UP (ref 0–0.2)
BASOPHILS NFR BLD AUTO: 0.5 % — SIGNIFICANT CHANGE UP (ref 0–2)
BILIRUB UR-MCNC: NEGATIVE — SIGNIFICANT CHANGE UP
BUN SERPL-MCNC: 14 MG/DL — SIGNIFICANT CHANGE UP (ref 7–18)
CALCIUM SERPL-MCNC: 8 MG/DL — LOW (ref 8.4–10.5)
CHLORIDE SERPL-SCNC: 111 MMOL/L — HIGH (ref 96–108)
CO2 SERPL-SCNC: 22 MMOL/L — SIGNIFICANT CHANGE UP (ref 22–31)
COLOR SPEC: YELLOW — SIGNIFICANT CHANGE UP
COMMENT - URINE: SIGNIFICANT CHANGE UP
CREAT SERPL-MCNC: 0.83 MG/DL — SIGNIFICANT CHANGE UP (ref 0.5–1.3)
CRP SERPL-MCNC: 201 MG/L — HIGH (ref 0–5)
DIFF PNL FLD: ABNORMAL
E COLI DNA BLD POS QL NAA+NON-PROBE: SIGNIFICANT CHANGE UP
EGFR: 72 ML/MIN/1.73M2 — SIGNIFICANT CHANGE UP
EGFR: 72 ML/MIN/1.73M2 — SIGNIFICANT CHANGE UP
EOSINOPHIL # BLD AUTO: 0.03 K/UL — SIGNIFICANT CHANGE UP (ref 0–0.5)
EOSINOPHIL NFR BLD AUTO: 0.2 % — SIGNIFICANT CHANGE UP (ref 0–6)
EPI CELLS # UR: PRESENT
ERYTHROCYTE [SEDIMENTATION RATE] IN BLOOD: 39 MM/HR — HIGH (ref 0–20)
GLUCOSE BLDC GLUCOMTR-MCNC: 108 MG/DL — HIGH (ref 70–99)
GLUCOSE BLDC GLUCOMTR-MCNC: 109 MG/DL — HIGH (ref 70–99)
GLUCOSE BLDC GLUCOMTR-MCNC: 110 MG/DL — HIGH (ref 70–99)
GLUCOSE BLDC GLUCOMTR-MCNC: 137 MG/DL — HIGH (ref 70–99)
GLUCOSE SERPL-MCNC: 120 MG/DL — HIGH (ref 70–99)
GLUCOSE UR QL: >=1000 MG/DL
GRAM STN FLD: ABNORMAL
HCT VFR BLD CALC: 31.2 % — LOW (ref 34.5–45)
HGB BLD-MCNC: 10.4 G/DL — LOW (ref 11.5–15.5)
IMM GRANULOCYTES NFR BLD AUTO: 0.7 % — SIGNIFICANT CHANGE UP (ref 0–0.9)
KETONES UR QL: 15 MG/DL
LACTATE SERPL-SCNC: 1.1 MMOL/L — SIGNIFICANT CHANGE UP (ref 0.7–2)
LACTATE SERPL-SCNC: 1.9 MMOL/L — SIGNIFICANT CHANGE UP (ref 0.7–2)
LEUKOCYTE ESTERASE UR-ACNC: ABNORMAL
LYMPHOCYTES # BLD AUTO: 1.2 K/UL — SIGNIFICANT CHANGE UP (ref 1–3.3)
LYMPHOCYTES # BLD AUTO: 9.3 % — LOW (ref 13–44)
MAGNESIUM SERPL-MCNC: 1.7 MG/DL — SIGNIFICANT CHANGE UP (ref 1.6–2.6)
MCHC RBC-ENTMCNC: 29.1 PG — SIGNIFICANT CHANGE UP (ref 27–34)
MCHC RBC-ENTMCNC: 33.3 G/DL — SIGNIFICANT CHANGE UP (ref 32–36)
MCV RBC AUTO: 87.2 FL — SIGNIFICANT CHANGE UP (ref 80–100)
METHOD TYPE: SIGNIFICANT CHANGE UP
MONOCYTES # BLD AUTO: 1.17 K/UL — HIGH (ref 0–0.9)
MONOCYTES NFR BLD AUTO: 9.1 % — SIGNIFICANT CHANGE UP (ref 2–14)
NEUTROPHILS # BLD AUTO: 10.35 K/UL — HIGH (ref 1.8–7.4)
NEUTROPHILS NFR BLD AUTO: 80.2 % — HIGH (ref 43–77)
NITRITE UR-MCNC: POSITIVE
NRBC BLD AUTO-RTO: 0 /100 WBCS — SIGNIFICANT CHANGE UP (ref 0–0)
PH UR: 6 — SIGNIFICANT CHANGE UP (ref 5–8)
PHOSPHATE SERPL-MCNC: 2.7 MG/DL — SIGNIFICANT CHANGE UP (ref 2.5–4.5)
PLATELET # BLD AUTO: 171 K/UL — SIGNIFICANT CHANGE UP (ref 150–400)
POTASSIUM SERPL-MCNC: 3.1 MMOL/L — LOW (ref 3.5–5.3)
POTASSIUM SERPL-SCNC: 3.1 MMOL/L — LOW (ref 3.5–5.3)
PROT UR-MCNC: 30 MG/DL
RBC # BLD: 3.58 M/UL — LOW (ref 3.8–5.2)
RBC # FLD: 13.2 % — SIGNIFICANT CHANGE UP (ref 10.3–14.5)
RBC CASTS # UR COMP ASSIST: 6 /HPF — HIGH (ref 0–4)
SODIUM SERPL-SCNC: 141 MMOL/L — SIGNIFICANT CHANGE UP (ref 135–145)
SP GR SPEC: 1.02 — SIGNIFICANT CHANGE UP (ref 1–1.03)
SPECIMEN SOURCE: SIGNIFICANT CHANGE UP
UROBILINOGEN FLD QL: 0.2 MG/DL — SIGNIFICANT CHANGE UP (ref 0.2–1)
WBC # BLD: 12.91 K/UL — HIGH (ref 3.8–10.5)
WBC # FLD AUTO: 12.91 K/UL — HIGH (ref 3.8–10.5)
WBC UR QL: 8 /HPF — HIGH (ref 0–5)

## 2025-07-19 PROCEDURE — 80053 COMPREHEN METABOLIC PANEL: CPT

## 2025-07-19 PROCEDURE — 83735 ASSAY OF MAGNESIUM: CPT

## 2025-07-19 PROCEDURE — 71260 CT THORAX DX C+: CPT

## 2025-07-19 PROCEDURE — 84484 ASSAY OF TROPONIN QUANT: CPT

## 2025-07-19 PROCEDURE — 86140 C-REACTIVE PROTEIN: CPT

## 2025-07-19 PROCEDURE — 71260 CT THORAX DX C+: CPT | Mod: 26

## 2025-07-19 PROCEDURE — 85730 THROMBOPLASTIN TIME PARTIAL: CPT

## 2025-07-19 PROCEDURE — 99223 1ST HOSP IP/OBS HIGH 75: CPT | Mod: GC

## 2025-07-19 PROCEDURE — 87040 BLOOD CULTURE FOR BACTERIA: CPT

## 2025-07-19 PROCEDURE — 82962 GLUCOSE BLOOD TEST: CPT

## 2025-07-19 PROCEDURE — 87086 URINE CULTURE/COLONY COUNT: CPT

## 2025-07-19 PROCEDURE — 70450 CT HEAD/BRAIN W/O DYE: CPT | Mod: 26

## 2025-07-19 PROCEDURE — 81001 URINALYSIS AUTO W/SCOPE: CPT

## 2025-07-19 PROCEDURE — 70450 CT HEAD/BRAIN W/O DYE: CPT

## 2025-07-19 PROCEDURE — 86703 HIV-1/HIV-2 1 RESULT ANTBDY: CPT

## 2025-07-19 PROCEDURE — 83605 ASSAY OF LACTIC ACID: CPT

## 2025-07-19 PROCEDURE — 36415 COLL VENOUS BLD VENIPUNCTURE: CPT

## 2025-07-19 PROCEDURE — 74177 CT ABD & PELVIS W/CONTRAST: CPT

## 2025-07-19 PROCEDURE — 85025 COMPLETE CBC W/AUTO DIFF WBC: CPT

## 2025-07-19 PROCEDURE — 71045 X-RAY EXAM CHEST 1 VIEW: CPT

## 2025-07-19 PROCEDURE — 84100 ASSAY OF PHOSPHORUS: CPT

## 2025-07-19 PROCEDURE — 85610 PROTHROMBIN TIME: CPT

## 2025-07-19 PROCEDURE — 85652 RBC SED RATE AUTOMATED: CPT

## 2025-07-19 PROCEDURE — 80048 BASIC METABOLIC PNL TOTAL CA: CPT

## 2025-07-19 PROCEDURE — 87150 DNA/RNA AMPLIFIED PROBE: CPT

## 2025-07-19 PROCEDURE — 71045 X-RAY EXAM CHEST 1 VIEW: CPT | Mod: 26

## 2025-07-19 PROCEDURE — 74177 CT ABD & PELVIS W/CONTRAST: CPT | Mod: 26

## 2025-07-19 PROCEDURE — 87637 SARSCOV2&INF A&B&RSV AMP PRB: CPT

## 2025-07-19 RX ORDER — SODIUM CHLORIDE 9 G/1000ML
1000 INJECTION, SOLUTION INTRAVENOUS
Refills: 0 | Status: DISCONTINUED | OUTPATIENT
Start: 2025-07-19 | End: 2025-07-24

## 2025-07-19 RX ORDER — SITAGLIPTIN AND METFORMIN HYDROCHLORIDE 1000; 50 MG/1; MG/1
1 TABLET, FILM COATED, EXTENDED RELEASE ORAL
Refills: 0 | DISCHARGE

## 2025-07-19 RX ORDER — AMLODIPINE BESYLATE 10 MG/1
1 TABLET ORAL
Refills: 0 | DISCHARGE

## 2025-07-19 RX ORDER — ERTAPENEM SODIUM 1 G/1
1000 INJECTION, POWDER, LYOPHILIZED, FOR SOLUTION INTRAMUSCULAR; INTRAVENOUS EVERY 24 HOURS
Refills: 0 | Status: DISCONTINUED | OUTPATIENT
Start: 2025-07-20 | End: 2025-07-24

## 2025-07-19 RX ORDER — GLUCAGON 3 MG/1
1 POWDER NASAL ONCE
Refills: 0 | Status: DISCONTINUED | OUTPATIENT
Start: 2025-07-19 | End: 2025-07-24

## 2025-07-19 RX ORDER — FENOFIBRATE 160 MG/1
1 TABLET ORAL
Refills: 0 | DISCHARGE

## 2025-07-19 RX ORDER — EMPAGLIFLOZIN 25 MG/1
1 TABLET, FILM COATED ORAL
Refills: 0 | DISCHARGE

## 2025-07-19 RX ORDER — SODIUM CHLORIDE 9 G/1000ML
500 INJECTION, SOLUTION INTRAVENOUS ONCE
Refills: 0 | Status: COMPLETED | OUTPATIENT
Start: 2025-07-19 | End: 2025-07-19

## 2025-07-19 RX ORDER — CEFTRIAXONE 500 MG/1
2000 INJECTION, POWDER, FOR SOLUTION INTRAMUSCULAR; INTRAVENOUS EVERY 24 HOURS
Refills: 0 | Status: DISCONTINUED | OUTPATIENT
Start: 2025-07-19 | End: 2025-07-19

## 2025-07-19 RX ORDER — ONDANSETRON HCL/PF 4 MG/2 ML
4 VIAL (ML) INJECTION EVERY 8 HOURS
Refills: 0 | Status: DISCONTINUED | OUTPATIENT
Start: 2025-07-19 | End: 2025-07-24

## 2025-07-19 RX ORDER — ACETAMINOPHEN 500 MG/5ML
650 LIQUID (ML) ORAL EVERY 6 HOURS
Refills: 0 | Status: DISCONTINUED | OUTPATIENT
Start: 2025-07-19 | End: 2025-07-24

## 2025-07-19 RX ORDER — INSULIN LISPRO 100 U/ML
INJECTION, SOLUTION INTRAVENOUS; SUBCUTANEOUS AT BEDTIME
Refills: 0 | Status: DISCONTINUED | OUTPATIENT
Start: 2025-07-19 | End: 2025-07-24

## 2025-07-19 RX ORDER — ATORVASTATIN CALCIUM 80 MG/1
10 TABLET, FILM COATED ORAL AT BEDTIME
Refills: 0 | Status: DISCONTINUED | OUTPATIENT
Start: 2025-07-19 | End: 2025-07-24

## 2025-07-19 RX ORDER — MELATONIN 5 MG
3 TABLET ORAL AT BEDTIME
Refills: 0 | Status: DISCONTINUED | OUTPATIENT
Start: 2025-07-19 | End: 2025-07-24

## 2025-07-19 RX ORDER — LEVOTHYROXINE SODIUM 300 MCG
1 TABLET ORAL
Refills: 0 | DISCHARGE

## 2025-07-19 RX ORDER — ENOXAPARIN SODIUM 100 MG/ML
40 INJECTION SUBCUTANEOUS EVERY 24 HOURS
Refills: 0 | Status: DISCONTINUED | OUTPATIENT
Start: 2025-07-19 | End: 2025-07-24

## 2025-07-19 RX ORDER — DEXTROSE 50 % IN WATER 50 %
25 SYRINGE (ML) INTRAVENOUS ONCE
Refills: 0 | Status: DISCONTINUED | OUTPATIENT
Start: 2025-07-19 | End: 2025-07-24

## 2025-07-19 RX ORDER — LOSARTAN POTASSIUM 100 MG/1
1 TABLET, FILM COATED ORAL
Refills: 0 | DISCHARGE

## 2025-07-19 RX ORDER — FENOFIBRATE 160 MG/1
145 TABLET ORAL DAILY
Refills: 0 | Status: DISCONTINUED | OUTPATIENT
Start: 2025-07-19 | End: 2025-07-24

## 2025-07-19 RX ORDER — GABAPENTIN 400 MG/1
300 CAPSULE ORAL THREE TIMES A DAY
Refills: 0 | Status: DISCONTINUED | OUTPATIENT
Start: 2025-07-19 | End: 2025-07-24

## 2025-07-19 RX ORDER — DEXTROSE 50 % IN WATER 50 %
15 SYRINGE (ML) INTRAVENOUS ONCE
Refills: 0 | Status: DISCONTINUED | OUTPATIENT
Start: 2025-07-19 | End: 2025-07-24

## 2025-07-19 RX ORDER — CEFTRIAXONE 500 MG/1
1000 INJECTION, POWDER, FOR SOLUTION INTRAMUSCULAR; INTRAVENOUS EVERY 24 HOURS
Refills: 0 | Status: DISCONTINUED | OUTPATIENT
Start: 2025-07-19 | End: 2025-07-19

## 2025-07-19 RX ORDER — ERTAPENEM SODIUM 1 G/1
1000 INJECTION, POWDER, LYOPHILIZED, FOR SOLUTION INTRAMUSCULAR; INTRAVENOUS ONCE
Refills: 0 | Status: COMPLETED | OUTPATIENT
Start: 2025-07-19 | End: 2025-07-19

## 2025-07-19 RX ORDER — INSULIN LISPRO 100 U/ML
INJECTION, SOLUTION INTRAVENOUS; SUBCUTANEOUS
Refills: 0 | Status: DISCONTINUED | OUTPATIENT
Start: 2025-07-19 | End: 2025-07-24

## 2025-07-19 RX ORDER — LEVOTHYROXINE SODIUM 300 MCG
100 TABLET ORAL DAILY
Refills: 0 | Status: DISCONTINUED | OUTPATIENT
Start: 2025-07-19 | End: 2025-07-24

## 2025-07-19 RX ORDER — ERTAPENEM SODIUM 1 G/1
INJECTION, POWDER, LYOPHILIZED, FOR SOLUTION INTRAMUSCULAR; INTRAVENOUS
Refills: 0 | Status: DISCONTINUED | OUTPATIENT
Start: 2025-07-19 | End: 2025-07-24

## 2025-07-19 RX ORDER — DEXTROSE 50 % IN WATER 50 %
12.5 SYRINGE (ML) INTRAVENOUS ONCE
Refills: 0 | Status: DISCONTINUED | OUTPATIENT
Start: 2025-07-19 | End: 2025-07-24

## 2025-07-19 RX ADMIN — GABAPENTIN 300 MILLIGRAM(S): 400 CAPSULE ORAL at 13:42

## 2025-07-19 RX ADMIN — Medication 40 MILLIEQUIVALENT(S): at 17:42

## 2025-07-19 RX ADMIN — SODIUM CHLORIDE 500 MILLILITER(S): 9 INJECTION, SOLUTION INTRAVENOUS at 01:28

## 2025-07-19 RX ADMIN — ATORVASTATIN CALCIUM 10 MILLIGRAM(S): 80 TABLET, FILM COATED ORAL at 21:08

## 2025-07-19 RX ADMIN — Medication 2000 MILLILITER(S): at 00:30

## 2025-07-19 RX ADMIN — CEFTRIAXONE 100 MILLIGRAM(S): 500 INJECTION, POWDER, FOR SOLUTION INTRAMUSCULAR; INTRAVENOUS at 08:57

## 2025-07-19 RX ADMIN — ENOXAPARIN SODIUM 40 MILLIGRAM(S): 100 INJECTION SUBCUTANEOUS at 13:42

## 2025-07-19 RX ADMIN — Medication 250 MILLIGRAM(S): at 00:30

## 2025-07-19 RX ADMIN — GABAPENTIN 300 MILLIGRAM(S): 400 CAPSULE ORAL at 21:08

## 2025-07-20 LAB
A1C WITH ESTIMATED AVERAGE GLUCOSE RESULT: 5.9 % — HIGH (ref 4–5.6)
ANION GAP SERPL CALC-SCNC: 15 MMOL/L — SIGNIFICANT CHANGE UP (ref 5–17)
BUN SERPL-MCNC: 13 MG/DL — SIGNIFICANT CHANGE UP (ref 7–18)
CALCIUM SERPL-MCNC: 8.5 MG/DL — SIGNIFICANT CHANGE UP (ref 8.4–10.5)
CHLORIDE SERPL-SCNC: 104 MMOL/L — SIGNIFICANT CHANGE UP (ref 96–108)
CO2 SERPL-SCNC: 16 MMOL/L — LOW (ref 22–31)
CREAT SERPL-MCNC: 0.74 MG/DL — SIGNIFICANT CHANGE UP (ref 0.5–1.3)
EGFR: 83 ML/MIN/1.73M2 — SIGNIFICANT CHANGE UP
EGFR: 83 ML/MIN/1.73M2 — SIGNIFICANT CHANGE UP
ESTIMATED AVERAGE GLUCOSE: 123 MG/DL — HIGH (ref 68–114)
GLUCOSE BLDC GLUCOMTR-MCNC: 112 MG/DL — HIGH (ref 70–99)
GLUCOSE BLDC GLUCOMTR-MCNC: 113 MG/DL — HIGH (ref 70–99)
GLUCOSE BLDC GLUCOMTR-MCNC: 122 MG/DL — HIGH (ref 70–99)
GLUCOSE BLDC GLUCOMTR-MCNC: 83 MG/DL — SIGNIFICANT CHANGE UP (ref 70–99)
GLUCOSE SERPL-MCNC: 70 MG/DL — SIGNIFICANT CHANGE UP (ref 70–99)
HCT VFR BLD CALC: 33.4 % — LOW (ref 34.5–45)
HGB BLD-MCNC: 10.9 G/DL — LOW (ref 11.5–15.5)
MCHC RBC-ENTMCNC: 28.5 PG — SIGNIFICANT CHANGE UP (ref 27–34)
MCHC RBC-ENTMCNC: 32.6 G/DL — SIGNIFICANT CHANGE UP (ref 32–36)
MCV RBC AUTO: 87.4 FL — SIGNIFICANT CHANGE UP (ref 80–100)
NRBC BLD AUTO-RTO: 0 /100 WBCS — SIGNIFICANT CHANGE UP (ref 0–0)
PLATELET # BLD AUTO: 164 K/UL — SIGNIFICANT CHANGE UP (ref 150–400)
POTASSIUM SERPL-MCNC: 3.7 MMOL/L — SIGNIFICANT CHANGE UP (ref 3.5–5.3)
POTASSIUM SERPL-SCNC: 3.7 MMOL/L — SIGNIFICANT CHANGE UP (ref 3.5–5.3)
RBC # BLD: 3.82 M/UL — SIGNIFICANT CHANGE UP (ref 3.8–5.2)
RBC # FLD: 13.1 % — SIGNIFICANT CHANGE UP (ref 10.3–14.5)
SODIUM SERPL-SCNC: 135 MMOL/L — SIGNIFICANT CHANGE UP (ref 135–145)
WBC # BLD: 12.62 K/UL — HIGH (ref 3.8–10.5)
WBC # FLD AUTO: 12.62 K/UL — HIGH (ref 3.8–10.5)

## 2025-07-20 PROCEDURE — 99233 SBSQ HOSP IP/OBS HIGH 50: CPT

## 2025-07-20 RX ADMIN — GABAPENTIN 300 MILLIGRAM(S): 400 CAPSULE ORAL at 21:14

## 2025-07-20 RX ADMIN — ERTAPENEM SODIUM 120 MILLIGRAM(S): 1 INJECTION, POWDER, LYOPHILIZED, FOR SOLUTION INTRAMUSCULAR; INTRAVENOUS at 23:21

## 2025-07-20 RX ADMIN — ATORVASTATIN CALCIUM 10 MILLIGRAM(S): 80 TABLET, FILM COATED ORAL at 21:15

## 2025-07-20 RX ADMIN — GABAPENTIN 300 MILLIGRAM(S): 400 CAPSULE ORAL at 05:30

## 2025-07-20 RX ADMIN — Medication 100 MICROGRAM(S): at 05:29

## 2025-07-20 RX ADMIN — FENOFIBRATE 145 MILLIGRAM(S): 160 TABLET ORAL at 13:03

## 2025-07-20 RX ADMIN — ERTAPENEM SODIUM 100 MILLIGRAM(S): 1 INJECTION, POWDER, LYOPHILIZED, FOR SOLUTION INTRAMUSCULAR; INTRAVENOUS at 00:12

## 2025-07-20 RX ADMIN — GABAPENTIN 300 MILLIGRAM(S): 400 CAPSULE ORAL at 13:03

## 2025-07-20 RX ADMIN — ENOXAPARIN SODIUM 40 MILLIGRAM(S): 100 INJECTION SUBCUTANEOUS at 13:03

## 2025-07-21 DIAGNOSIS — R78.81 BACTEREMIA: ICD-10-CM

## 2025-07-21 DIAGNOSIS — Z75.8 OTHER PROBLEMS RELATED TO MEDICAL FACILITIES AND OTHER HEALTH CARE: ICD-10-CM

## 2025-07-21 LAB
-  AMPICILLIN/SULBACTAM: SIGNIFICANT CHANGE UP
-  AMPICILLIN: SIGNIFICANT CHANGE UP
-  AZTREONAM: SIGNIFICANT CHANGE UP
-  CEFAZOLIN: SIGNIFICANT CHANGE UP
-  CEFEPIME: SIGNIFICANT CHANGE UP
-  CEFTRIAXONE: SIGNIFICANT CHANGE UP
-  CIPROFLOXACIN: SIGNIFICANT CHANGE UP
-  ERTAPENEM: SIGNIFICANT CHANGE UP
-  GENTAMICIN: SIGNIFICANT CHANGE UP
-  IMIPENEM: SIGNIFICANT CHANGE UP
-  LEVOFLOXACIN: SIGNIFICANT CHANGE UP
-  MEROPENEM: SIGNIFICANT CHANGE UP
-  PIPERACILLIN/TAZOBACTAM: SIGNIFICANT CHANGE UP
-  TIGECYCLINE: SIGNIFICANT CHANGE UP
-  TOBRAMYCIN: SIGNIFICANT CHANGE UP
-  TRIMETHOPRIM/SULFAMETHOXAZOLE: SIGNIFICANT CHANGE UP
BASOPHILS # BLD AUTO: 0.03 K/UL — SIGNIFICANT CHANGE UP (ref 0–0.2)
BASOPHILS NFR BLD AUTO: 0.3 % — SIGNIFICANT CHANGE UP (ref 0–2)
CULTURE RESULTS: ABNORMAL
EOSINOPHIL # BLD AUTO: 0.25 K/UL — SIGNIFICANT CHANGE UP (ref 0–0.5)
EOSINOPHIL NFR BLD AUTO: 2.9 % — SIGNIFICANT CHANGE UP (ref 0–6)
GLUCOSE BLDC GLUCOMTR-MCNC: 125 MG/DL — HIGH (ref 70–99)
GLUCOSE BLDC GLUCOMTR-MCNC: 127 MG/DL — HIGH (ref 70–99)
GLUCOSE BLDC GLUCOMTR-MCNC: 138 MG/DL — HIGH (ref 70–99)
GLUCOSE BLDC GLUCOMTR-MCNC: 211 MG/DL — HIGH (ref 70–99)
HCT VFR BLD CALC: 34.2 % — LOW (ref 34.5–45)
HGB BLD-MCNC: 11.5 G/DL — SIGNIFICANT CHANGE UP (ref 11.5–15.5)
IMM GRANULOCYTES NFR BLD AUTO: 0.5 % — SIGNIFICANT CHANGE UP (ref 0–0.9)
LYMPHOCYTES # BLD AUTO: 1.15 K/UL — SIGNIFICANT CHANGE UP (ref 1–3.3)
LYMPHOCYTES # BLD AUTO: 13.3 % — SIGNIFICANT CHANGE UP (ref 13–44)
MCHC RBC-ENTMCNC: 28.4 PG — SIGNIFICANT CHANGE UP (ref 27–34)
MCHC RBC-ENTMCNC: 33.6 G/DL — SIGNIFICANT CHANGE UP (ref 32–36)
MCV RBC AUTO: 84.4 FL — SIGNIFICANT CHANGE UP (ref 80–100)
METHOD TYPE: SIGNIFICANT CHANGE UP
MONOCYTES # BLD AUTO: 0.88 K/UL — SIGNIFICANT CHANGE UP (ref 0–0.9)
MONOCYTES NFR BLD AUTO: 10.2 % — SIGNIFICANT CHANGE UP (ref 2–14)
NEUTROPHILS # BLD AUTO: 6.28 K/UL — SIGNIFICANT CHANGE UP (ref 1.8–7.4)
NEUTROPHILS NFR BLD AUTO: 72.8 % — SIGNIFICANT CHANGE UP (ref 43–77)
NRBC BLD AUTO-RTO: 0 /100 WBCS — SIGNIFICANT CHANGE UP (ref 0–0)
ORGANISM # SPEC MICROSCOPIC CNT: ABNORMAL
PLATELET # BLD AUTO: 205 K/UL — SIGNIFICANT CHANGE UP (ref 150–400)
RBC # BLD: 4.05 M/UL — SIGNIFICANT CHANGE UP (ref 3.8–5.2)
RBC # FLD: 12.5 % — SIGNIFICANT CHANGE UP (ref 10.3–14.5)
SPECIMEN SOURCE: SIGNIFICANT CHANGE UP
WBC # BLD: 8.63 K/UL — SIGNIFICANT CHANGE UP (ref 3.8–10.5)
WBC # FLD AUTO: 8.63 K/UL — SIGNIFICANT CHANGE UP (ref 3.8–10.5)

## 2025-07-21 PROCEDURE — 99233 SBSQ HOSP IP/OBS HIGH 50: CPT

## 2025-07-21 RX ADMIN — Medication 100 MICROGRAM(S): at 05:18

## 2025-07-21 RX ADMIN — INSULIN LISPRO 2: 100 INJECTION, SOLUTION INTRAVENOUS; SUBCUTANEOUS at 11:42

## 2025-07-21 RX ADMIN — GABAPENTIN 300 MILLIGRAM(S): 400 CAPSULE ORAL at 05:18

## 2025-07-21 RX ADMIN — ENOXAPARIN SODIUM 40 MILLIGRAM(S): 100 INJECTION SUBCUTANEOUS at 13:28

## 2025-07-21 RX ADMIN — FENOFIBRATE 145 MILLIGRAM(S): 160 TABLET ORAL at 11:31

## 2025-07-21 RX ADMIN — ERTAPENEM SODIUM 120 MILLIGRAM(S): 1 INJECTION, POWDER, LYOPHILIZED, FOR SOLUTION INTRAMUSCULAR; INTRAVENOUS at 22:17

## 2025-07-21 RX ADMIN — GABAPENTIN 300 MILLIGRAM(S): 400 CAPSULE ORAL at 13:28

## 2025-07-21 RX ADMIN — GABAPENTIN 300 MILLIGRAM(S): 400 CAPSULE ORAL at 22:18

## 2025-07-21 RX ADMIN — ATORVASTATIN CALCIUM 10 MILLIGRAM(S): 80 TABLET, FILM COATED ORAL at 22:18

## 2025-07-22 LAB
-  AMPICILLIN/SULBACTAM: SIGNIFICANT CHANGE UP
-  AMPICILLIN: SIGNIFICANT CHANGE UP
-  AZTREONAM: SIGNIFICANT CHANGE UP
-  CEFAZOLIN: SIGNIFICANT CHANGE UP
-  CEFEPIME: SIGNIFICANT CHANGE UP
-  CEFTRIAXONE: SIGNIFICANT CHANGE UP
-  CEFUROXIME: SIGNIFICANT CHANGE UP
-  CIPROFLOXACIN: SIGNIFICANT CHANGE UP
-  ERTAPENEM: SIGNIFICANT CHANGE UP
-  GENTAMICIN: SIGNIFICANT CHANGE UP
-  IMIPENEM: SIGNIFICANT CHANGE UP
-  LEVOFLOXACIN: SIGNIFICANT CHANGE UP
-  MEROPENEM: SIGNIFICANT CHANGE UP
-  NITROFURANTOIN: SIGNIFICANT CHANGE UP
-  PIPERACILLIN/TAZOBACTAM: SIGNIFICANT CHANGE UP
-  TIGECYCLINE: SIGNIFICANT CHANGE UP
-  TOBRAMYCIN: SIGNIFICANT CHANGE UP
-  TRIMETHOPRIM/SULFAMETHOXAZOLE: SIGNIFICANT CHANGE UP
ANION GAP SERPL CALC-SCNC: 7 MMOL/L — SIGNIFICANT CHANGE UP (ref 5–17)
BUN SERPL-MCNC: 15 MG/DL — SIGNIFICANT CHANGE UP (ref 7–18)
CALCIUM SERPL-MCNC: 9.1 MG/DL — SIGNIFICANT CHANGE UP (ref 8.4–10.5)
CHLORIDE SERPL-SCNC: 106 MMOL/L — SIGNIFICANT CHANGE UP (ref 96–108)
CO2 SERPL-SCNC: 23 MMOL/L — SIGNIFICANT CHANGE UP (ref 22–31)
CREAT SERPL-MCNC: 0.65 MG/DL — SIGNIFICANT CHANGE UP (ref 0.5–1.3)
CRP SERPL-MCNC: 68.7 MG/L — HIGH (ref 0–5)
CULTURE RESULTS: ABNORMAL
EGFR: 90 ML/MIN/1.73M2 — SIGNIFICANT CHANGE UP
EGFR: 90 ML/MIN/1.73M2 — SIGNIFICANT CHANGE UP
ERYTHROCYTE [SEDIMENTATION RATE] IN BLOOD: 84 MM/HR — HIGH (ref 0–20)
GLUCOSE BLDC GLUCOMTR-MCNC: 143 MG/DL — HIGH (ref 70–99)
GLUCOSE BLDC GLUCOMTR-MCNC: 144 MG/DL — HIGH (ref 70–99)
GLUCOSE BLDC GLUCOMTR-MCNC: 153 MG/DL — HIGH (ref 70–99)
GLUCOSE BLDC GLUCOMTR-MCNC: 244 MG/DL — HIGH (ref 70–99)
GLUCOSE SERPL-MCNC: 140 MG/DL — HIGH (ref 70–99)
HCT VFR BLD CALC: 35.3 % — SIGNIFICANT CHANGE UP (ref 34.5–45)
HGB BLD-MCNC: 11.9 G/DL — SIGNIFICANT CHANGE UP (ref 11.5–15.5)
MCHC RBC-ENTMCNC: 28.7 PG — SIGNIFICANT CHANGE UP (ref 27–34)
MCHC RBC-ENTMCNC: 33.7 G/DL — SIGNIFICANT CHANGE UP (ref 32–36)
MCV RBC AUTO: 85.1 FL — SIGNIFICANT CHANGE UP (ref 80–100)
METHOD TYPE: SIGNIFICANT CHANGE UP
NRBC BLD AUTO-RTO: 0 /100 WBCS — SIGNIFICANT CHANGE UP (ref 0–0)
ORGANISM # SPEC MICROSCOPIC CNT: ABNORMAL
ORGANISM # SPEC MICROSCOPIC CNT: ABNORMAL
PLATELET # BLD AUTO: 246 K/UL — SIGNIFICANT CHANGE UP (ref 150–400)
POTASSIUM SERPL-MCNC: 3.4 MMOL/L — LOW (ref 3.5–5.3)
POTASSIUM SERPL-SCNC: 3.4 MMOL/L — LOW (ref 3.5–5.3)
RBC # BLD: 4.15 M/UL — SIGNIFICANT CHANGE UP (ref 3.8–5.2)
RBC # FLD: 12.7 % — SIGNIFICANT CHANGE UP (ref 10.3–14.5)
SODIUM SERPL-SCNC: 136 MMOL/L — SIGNIFICANT CHANGE UP (ref 135–145)
SPECIMEN SOURCE: SIGNIFICANT CHANGE UP
WBC # BLD: 6.25 K/UL — SIGNIFICANT CHANGE UP (ref 3.8–10.5)
WBC # FLD AUTO: 6.25 K/UL — SIGNIFICANT CHANGE UP (ref 3.8–10.5)

## 2025-07-22 PROCEDURE — 80048 BASIC METABOLIC PNL TOTAL CA: CPT

## 2025-07-22 PROCEDURE — 80053 COMPREHEN METABOLIC PANEL: CPT

## 2025-07-22 PROCEDURE — 36415 COLL VENOUS BLD VENIPUNCTURE: CPT

## 2025-07-22 PROCEDURE — 87040 BLOOD CULTURE FOR BACTERIA: CPT

## 2025-07-22 PROCEDURE — 81001 URINALYSIS AUTO W/SCOPE: CPT

## 2025-07-22 PROCEDURE — 93005 ELECTROCARDIOGRAM TRACING: CPT

## 2025-07-22 PROCEDURE — 97116 GAIT TRAINING THERAPY: CPT

## 2025-07-22 PROCEDURE — 84484 ASSAY OF TROPONIN QUANT: CPT

## 2025-07-22 PROCEDURE — 74177 CT ABD & PELVIS W/CONTRAST: CPT

## 2025-07-22 PROCEDURE — 85027 COMPLETE CBC AUTOMATED: CPT

## 2025-07-22 PROCEDURE — 97530 THERAPEUTIC ACTIVITIES: CPT

## 2025-07-22 PROCEDURE — 87637 SARSCOV2&INF A&B&RSV AMP PRB: CPT

## 2025-07-22 PROCEDURE — 85652 RBC SED RATE AUTOMATED: CPT

## 2025-07-22 PROCEDURE — 71045 X-RAY EXAM CHEST 1 VIEW: CPT

## 2025-07-22 PROCEDURE — 83605 ASSAY OF LACTIC ACID: CPT

## 2025-07-22 PROCEDURE — 83735 ASSAY OF MAGNESIUM: CPT

## 2025-07-22 PROCEDURE — 85730 THROMBOPLASTIN TIME PARTIAL: CPT

## 2025-07-22 PROCEDURE — 70450 CT HEAD/BRAIN W/O DYE: CPT

## 2025-07-22 PROCEDURE — 99233 SBSQ HOSP IP/OBS HIGH 50: CPT

## 2025-07-22 PROCEDURE — 82962 GLUCOSE BLOOD TEST: CPT

## 2025-07-22 PROCEDURE — 84100 ASSAY OF PHOSPHORUS: CPT

## 2025-07-22 PROCEDURE — 71260 CT THORAX DX C+: CPT

## 2025-07-22 PROCEDURE — 97162 PT EVAL MOD COMPLEX 30 MIN: CPT

## 2025-07-22 PROCEDURE — 86703 HIV-1/HIV-2 1 RESULT ANTBDY: CPT

## 2025-07-22 PROCEDURE — 87150 DNA/RNA AMPLIFIED PROBE: CPT

## 2025-07-22 PROCEDURE — 86140 C-REACTIVE PROTEIN: CPT

## 2025-07-22 PROCEDURE — 83036 HEMOGLOBIN GLYCOSYLATED A1C: CPT

## 2025-07-22 PROCEDURE — 85610 PROTHROMBIN TIME: CPT

## 2025-07-22 PROCEDURE — 85025 COMPLETE CBC W/AUTO DIFF WBC: CPT

## 2025-07-22 PROCEDURE — 87086 URINE CULTURE/COLONY COUNT: CPT

## 2025-07-22 PROCEDURE — 87186 SC STD MICRODIL/AGAR DIL: CPT

## 2025-07-22 PROCEDURE — 87077 CULTURE AEROBIC IDENTIFY: CPT

## 2025-07-22 RX ADMIN — ENOXAPARIN SODIUM 40 MILLIGRAM(S): 100 INJECTION SUBCUTANEOUS at 13:23

## 2025-07-22 RX ADMIN — INSULIN LISPRO 2: 100 INJECTION, SOLUTION INTRAVENOUS; SUBCUTANEOUS at 11:54

## 2025-07-22 RX ADMIN — GABAPENTIN 300 MILLIGRAM(S): 400 CAPSULE ORAL at 22:32

## 2025-07-22 RX ADMIN — ERTAPENEM SODIUM 120 MILLIGRAM(S): 1 INJECTION, POWDER, LYOPHILIZED, FOR SOLUTION INTRAMUSCULAR; INTRAVENOUS at 22:32

## 2025-07-22 RX ADMIN — FENOFIBRATE 145 MILLIGRAM(S): 160 TABLET ORAL at 11:54

## 2025-07-22 RX ADMIN — ATORVASTATIN CALCIUM 10 MILLIGRAM(S): 80 TABLET, FILM COATED ORAL at 22:32

## 2025-07-22 RX ADMIN — GABAPENTIN 300 MILLIGRAM(S): 400 CAPSULE ORAL at 13:23

## 2025-07-22 RX ADMIN — Medication 100 MICROGRAM(S): at 05:04

## 2025-07-22 RX ADMIN — GABAPENTIN 300 MILLIGRAM(S): 400 CAPSULE ORAL at 05:03

## 2025-07-23 LAB
ANION GAP SERPL CALC-SCNC: 6 MMOL/L — SIGNIFICANT CHANGE UP (ref 5–17)
BUN SERPL-MCNC: 22 MG/DL — HIGH (ref 7–18)
CALCIUM SERPL-MCNC: 9 MG/DL — SIGNIFICANT CHANGE UP (ref 8.4–10.5)
CHLORIDE SERPL-SCNC: 107 MMOL/L — SIGNIFICANT CHANGE UP (ref 96–108)
CO2 SERPL-SCNC: 27 MMOL/L — SIGNIFICANT CHANGE UP (ref 22–31)
CREAT SERPL-MCNC: 0.76 MG/DL — SIGNIFICANT CHANGE UP (ref 0.5–1.3)
EGFR: 80 ML/MIN/1.73M2 — SIGNIFICANT CHANGE UP
EGFR: 80 ML/MIN/1.73M2 — SIGNIFICANT CHANGE UP
GLUCOSE BLDC GLUCOMTR-MCNC: 138 MG/DL — HIGH (ref 70–99)
GLUCOSE BLDC GLUCOMTR-MCNC: 161 MG/DL — HIGH (ref 70–99)
GLUCOSE BLDC GLUCOMTR-MCNC: 165 MG/DL — HIGH (ref 70–99)
GLUCOSE BLDC GLUCOMTR-MCNC: 187 MG/DL — HIGH (ref 70–99)
GLUCOSE SERPL-MCNC: 137 MG/DL — HIGH (ref 70–99)
HCT VFR BLD CALC: 35.1 % — SIGNIFICANT CHANGE UP (ref 34.5–45)
HGB BLD-MCNC: 11.7 G/DL — SIGNIFICANT CHANGE UP (ref 11.5–15.5)
MAGNESIUM SERPL-MCNC: 2 MG/DL — SIGNIFICANT CHANGE UP (ref 1.6–2.6)
MCHC RBC-ENTMCNC: 28.4 PG — SIGNIFICANT CHANGE UP (ref 27–34)
MCHC RBC-ENTMCNC: 33.3 G/DL — SIGNIFICANT CHANGE UP (ref 32–36)
MCV RBC AUTO: 85.2 FL — SIGNIFICANT CHANGE UP (ref 80–100)
NRBC BLD AUTO-RTO: 0 /100 WBCS — SIGNIFICANT CHANGE UP (ref 0–0)
PHOSPHATE SERPL-MCNC: 3.1 MG/DL — SIGNIFICANT CHANGE UP (ref 2.5–4.5)
PLATELET # BLD AUTO: 246 K/UL — SIGNIFICANT CHANGE UP (ref 150–400)
POTASSIUM SERPL-MCNC: 3.6 MMOL/L — SIGNIFICANT CHANGE UP (ref 3.5–5.3)
POTASSIUM SERPL-SCNC: 3.6 MMOL/L — SIGNIFICANT CHANGE UP (ref 3.5–5.3)
RBC # BLD: 4.12 M/UL — SIGNIFICANT CHANGE UP (ref 3.8–5.2)
RBC # FLD: 12.9 % — SIGNIFICANT CHANGE UP (ref 10.3–14.5)
SODIUM SERPL-SCNC: 140 MMOL/L — SIGNIFICANT CHANGE UP (ref 135–145)
WBC # BLD: 7.29 K/UL — SIGNIFICANT CHANGE UP (ref 3.8–10.5)
WBC # FLD AUTO: 7.29 K/UL — SIGNIFICANT CHANGE UP (ref 3.8–10.5)

## 2025-07-23 PROCEDURE — 99233 SBSQ HOSP IP/OBS HIGH 50: CPT

## 2025-07-23 RX ADMIN — GABAPENTIN 300 MILLIGRAM(S): 400 CAPSULE ORAL at 05:08

## 2025-07-23 RX ADMIN — GABAPENTIN 300 MILLIGRAM(S): 400 CAPSULE ORAL at 14:54

## 2025-07-23 RX ADMIN — ERTAPENEM SODIUM 120 MILLIGRAM(S): 1 INJECTION, POWDER, LYOPHILIZED, FOR SOLUTION INTRAMUSCULAR; INTRAVENOUS at 22:12

## 2025-07-23 RX ADMIN — FENOFIBRATE 145 MILLIGRAM(S): 160 TABLET ORAL at 12:08

## 2025-07-23 RX ADMIN — ATORVASTATIN CALCIUM 10 MILLIGRAM(S): 80 TABLET, FILM COATED ORAL at 22:10

## 2025-07-23 RX ADMIN — Medication 100 MICROGRAM(S): at 05:08

## 2025-07-23 RX ADMIN — GABAPENTIN 300 MILLIGRAM(S): 400 CAPSULE ORAL at 22:10

## 2025-07-23 RX ADMIN — ENOXAPARIN SODIUM 40 MILLIGRAM(S): 100 INJECTION SUBCUTANEOUS at 14:02

## 2025-07-23 RX ADMIN — INSULIN LISPRO 1: 100 INJECTION, SOLUTION INTRAVENOUS; SUBCUTANEOUS at 17:01

## 2025-07-23 RX ADMIN — INSULIN LISPRO 1: 100 INJECTION, SOLUTION INTRAVENOUS; SUBCUTANEOUS at 12:08

## 2025-07-24 ENCOUNTER — TRANSCRIPTION ENCOUNTER (OUTPATIENT)
Age: 80
End: 2025-07-24

## 2025-07-24 VITALS — SYSTOLIC BLOOD PRESSURE: 101 MMHG | OXYGEN SATURATION: 98 % | HEART RATE: 86 BPM | DIASTOLIC BLOOD PRESSURE: 67 MMHG

## 2025-07-24 LAB
ANION GAP SERPL CALC-SCNC: 6 MMOL/L — SIGNIFICANT CHANGE UP (ref 5–17)
BUN SERPL-MCNC: 18 MG/DL — SIGNIFICANT CHANGE UP (ref 7–18)
CALCIUM SERPL-MCNC: 9 MG/DL — SIGNIFICANT CHANGE UP (ref 8.4–10.5)
CHLORIDE SERPL-SCNC: 109 MMOL/L — HIGH (ref 96–108)
CO2 SERPL-SCNC: 25 MMOL/L — SIGNIFICANT CHANGE UP (ref 22–31)
CREAT SERPL-MCNC: 0.73 MG/DL — SIGNIFICANT CHANGE UP (ref 0.5–1.3)
CULTURE RESULTS: SIGNIFICANT CHANGE UP
EGFR: 84 ML/MIN/1.73M2 — SIGNIFICANT CHANGE UP
EGFR: 84 ML/MIN/1.73M2 — SIGNIFICANT CHANGE UP
GLUCOSE BLDC GLUCOMTR-MCNC: 133 MG/DL — HIGH (ref 70–99)
GLUCOSE SERPL-MCNC: 128 MG/DL — HIGH (ref 70–99)
HCT VFR BLD CALC: 35.6 % — SIGNIFICANT CHANGE UP (ref 34.5–45)
HGB BLD-MCNC: 11.7 G/DL — SIGNIFICANT CHANGE UP (ref 11.5–15.5)
MAGNESIUM SERPL-MCNC: 1.9 MG/DL — SIGNIFICANT CHANGE UP (ref 1.6–2.6)
MCHC RBC-ENTMCNC: 28.5 PG — SIGNIFICANT CHANGE UP (ref 27–34)
MCHC RBC-ENTMCNC: 32.9 G/DL — SIGNIFICANT CHANGE UP (ref 32–36)
MCV RBC AUTO: 86.6 FL — SIGNIFICANT CHANGE UP (ref 80–100)
NRBC BLD AUTO-RTO: 0 /100 WBCS — SIGNIFICANT CHANGE UP (ref 0–0)
PHOSPHATE SERPL-MCNC: 3.4 MG/DL — SIGNIFICANT CHANGE UP (ref 2.5–4.5)
PLATELET # BLD AUTO: 295 K/UL — SIGNIFICANT CHANGE UP (ref 150–400)
POTASSIUM SERPL-MCNC: 3.7 MMOL/L — SIGNIFICANT CHANGE UP (ref 3.5–5.3)
POTASSIUM SERPL-SCNC: 3.7 MMOL/L — SIGNIFICANT CHANGE UP (ref 3.5–5.3)
RBC # BLD: 4.11 M/UL — SIGNIFICANT CHANGE UP (ref 3.8–5.2)
RBC # FLD: 12.8 % — SIGNIFICANT CHANGE UP (ref 10.3–14.5)
SODIUM SERPL-SCNC: 140 MMOL/L — SIGNIFICANT CHANGE UP (ref 135–145)
SPECIMEN SOURCE: SIGNIFICANT CHANGE UP
WBC # BLD: 6.26 K/UL — SIGNIFICANT CHANGE UP (ref 3.8–10.5)
WBC # FLD AUTO: 6.26 K/UL — SIGNIFICANT CHANGE UP (ref 3.8–10.5)

## 2025-07-24 PROCEDURE — 86140 C-REACTIVE PROTEIN: CPT

## 2025-07-24 PROCEDURE — 97116 GAIT TRAINING THERAPY: CPT

## 2025-07-24 PROCEDURE — 96375 TX/PRO/DX INJ NEW DRUG ADDON: CPT

## 2025-07-24 PROCEDURE — 87150 DNA/RNA AMPLIFIED PROBE: CPT

## 2025-07-24 PROCEDURE — 83036 HEMOGLOBIN GLYCOSYLATED A1C: CPT

## 2025-07-24 PROCEDURE — 85025 COMPLETE CBC W/AUTO DIFF WBC: CPT

## 2025-07-24 PROCEDURE — 87086 URINE CULTURE/COLONY COUNT: CPT

## 2025-07-24 PROCEDURE — 99291 CRITICAL CARE FIRST HOUR: CPT | Mod: 25

## 2025-07-24 PROCEDURE — 86703 HIV-1/HIV-2 1 RESULT ANTBDY: CPT

## 2025-07-24 PROCEDURE — 96374 THER/PROPH/DIAG INJ IV PUSH: CPT

## 2025-07-24 PROCEDURE — 80053 COMPREHEN METABOLIC PANEL: CPT

## 2025-07-24 PROCEDURE — 85652 RBC SED RATE AUTOMATED: CPT

## 2025-07-24 PROCEDURE — 83735 ASSAY OF MAGNESIUM: CPT

## 2025-07-24 PROCEDURE — 80048 BASIC METABOLIC PNL TOTAL CA: CPT

## 2025-07-24 PROCEDURE — 71045 X-RAY EXAM CHEST 1 VIEW: CPT

## 2025-07-24 PROCEDURE — 87186 SC STD MICRODIL/AGAR DIL: CPT

## 2025-07-24 PROCEDURE — 82962 GLUCOSE BLOOD TEST: CPT

## 2025-07-24 PROCEDURE — 85730 THROMBOPLASTIN TIME PARTIAL: CPT

## 2025-07-24 PROCEDURE — 85610 PROTHROMBIN TIME: CPT

## 2025-07-24 PROCEDURE — 83605 ASSAY OF LACTIC ACID: CPT

## 2025-07-24 PROCEDURE — 74177 CT ABD & PELVIS W/CONTRAST: CPT

## 2025-07-24 PROCEDURE — 71260 CT THORAX DX C+: CPT

## 2025-07-24 PROCEDURE — 87040 BLOOD CULTURE FOR BACTERIA: CPT

## 2025-07-24 PROCEDURE — 85027 COMPLETE CBC AUTOMATED: CPT

## 2025-07-24 PROCEDURE — 36415 COLL VENOUS BLD VENIPUNCTURE: CPT

## 2025-07-24 PROCEDURE — 99239 HOSP IP/OBS DSCHRG MGMT >30: CPT

## 2025-07-24 PROCEDURE — 97162 PT EVAL MOD COMPLEX 30 MIN: CPT

## 2025-07-24 PROCEDURE — 70450 CT HEAD/BRAIN W/O DYE: CPT

## 2025-07-24 PROCEDURE — 93005 ELECTROCARDIOGRAM TRACING: CPT

## 2025-07-24 PROCEDURE — 97530 THERAPEUTIC ACTIVITIES: CPT

## 2025-07-24 PROCEDURE — 84100 ASSAY OF PHOSPHORUS: CPT

## 2025-07-24 PROCEDURE — 87637 SARSCOV2&INF A&B&RSV AMP PRB: CPT

## 2025-07-24 PROCEDURE — 84484 ASSAY OF TROPONIN QUANT: CPT

## 2025-07-24 PROCEDURE — 81001 URINALYSIS AUTO W/SCOPE: CPT

## 2025-07-24 PROCEDURE — 87077 CULTURE AEROBIC IDENTIFY: CPT

## 2025-07-24 RX ORDER — SULFAMETHOXAZOLE/TRIMETHOPRIM 800-160 MG
1 TABLET ORAL
Qty: 20 | Refills: 0
Start: 2025-07-24 | End: 2025-08-02

## 2025-07-24 RX ADMIN — GABAPENTIN 300 MILLIGRAM(S): 400 CAPSULE ORAL at 05:29

## 2025-07-24 RX ADMIN — Medication 100 MICROGRAM(S): at 05:30

## 2025-07-26 LAB
CULTURE RESULTS: SIGNIFICANT CHANGE UP
CULTURE RESULTS: SIGNIFICANT CHANGE UP
SPECIMEN SOURCE: SIGNIFICANT CHANGE UP
SPECIMEN SOURCE: SIGNIFICANT CHANGE UP

## (undated) DEVICE — DRAPE DRAINAGE BAG URO CATCH II

## (undated) DEVICE — PACK CYSTO

## (undated) DEVICE — SOL IRR BAG H2O 3000ML

## (undated) DEVICE — VENODYNE/SCD SLEEVE CALF MEDIUM

## (undated) DEVICE — PREP BETADINE SPONGE STICKS

## (undated) DEVICE — GOWN XL

## (undated) DEVICE — SOL IRR POUR NS 0.9% 1500ML

## (undated) DEVICE — ELCTR GROUNDING PAD ADULT COVIDIEN

## (undated) DEVICE — WARMING BLANKET UPPER ADULT

## (undated) DEVICE — SOL IRR BAG NS 0.9% 3000ML

## (undated) DEVICE — CABLE DAC ACTIVE CORD

## (undated) DEVICE — SYR CATH TIP 2 OZ

## (undated) DEVICE — LAP PAD W RING 18 X 18"

## (undated) DEVICE — VISITEC 4X4